# Patient Record
Sex: FEMALE | Race: WHITE
[De-identification: names, ages, dates, MRNs, and addresses within clinical notes are randomized per-mention and may not be internally consistent; named-entity substitution may affect disease eponyms.]

---

## 2017-02-17 ENCOUNTER — HOSPITAL ENCOUNTER (EMERGENCY)
Dept: HOSPITAL 61 - ER | Age: 67
LOS: 1 days | Discharge: HOME | End: 2017-02-18
Payer: MEDICARE

## 2017-02-17 VITALS — BODY MASS INDEX: 22.5 KG/M2 | WEIGHT: 140 LBS | HEIGHT: 66 IN

## 2017-02-17 VITALS — SYSTOLIC BLOOD PRESSURE: 151 MMHG | DIASTOLIC BLOOD PRESSURE: 65 MMHG
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
## 2017-02-17 DIAGNOSIS — J44.9: ICD-10-CM
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
## 2017-02-17 DIAGNOSIS — F41.9: ICD-10-CM

## 2017-02-17 DIAGNOSIS — Z79.02: ICD-10-CM

## 2017-02-17 DIAGNOSIS — Z95.5: ICD-10-CM

## 2017-02-17 DIAGNOSIS — S51.812A: Primary | ICD-10-CM

## 2017-02-17 DIAGNOSIS — K21.9: ICD-10-CM

## 2017-02-17 DIAGNOSIS — I25.2: ICD-10-CM

## 2017-02-17 DIAGNOSIS — Z98.49: ICD-10-CM

## 2017-02-17 DIAGNOSIS — Z90.81: ICD-10-CM

## 2017-02-17 DIAGNOSIS — I10: ICD-10-CM

## 2017-02-17 PROCEDURE — 99283 EMERGENCY DEPT VISIT LOW MDM: CPT

## 2017-02-17 NOTE — PHYS DOC
Past Medical History


Past Medical History:  Anxiety, COPD, GERD, Hypertension, MI


Additional Past Medical Histor:  external lupus


Past Surgical History:  Splenectomy, Tonsillectomy, Other


Additional Past Surgical Histo:  3 stents, liver repair, cataract removal


Alcohol Use:  None


Drug Use:  None





Adult General


Chief Complaint


Chief Complaint:  LACERATION/AVULSION





HPI


HPI


Patient is a 66  year old female with history of anxiety COPD acid reflex 

currently on Plavix who presents today with cat scratches on the left forearm 

from her own cat. Patient states the cats are up-to-date with their shots.





Review of Systems


Review of Systems





Constitutional: Denies fever or chills []


Eyes: Denies change in visual acuity, redness, or eye pain []


Musculoskeletal: Denies back pain or joint pain []


Integument: cat scratches on the left forearm.


Neurologic: Denies headache, focal weakness or sensory changes []


Endocrine: Denies polyuria or polydipsia []





Current Medications


Current Medications





 Current Medications








 Medications


  (Trade)  Dose


 Ordered  Sig/Mikey  Start Time


 Stop Time Status Last Admin


Dose Admin


 


 Acetaminophen/


 Hydrocodone Bitart


  (Lortab 5/325)  1 tab  1X  ONCE  2/17/17 23:00


 2/17/17 23:01 DC 2/17/17 22:57


1 TAB


 


 Amoxicillin/


 Clavulanate


 Potassium


  (Augmentin 500/


 125mg)  1 tab  1X  ONCE  2/17/17 23:00


 2/17/17 23:01 DC 2/17/17 22:57


1 TAB


 


 Lidocaine/


 Epinephrine


  (Let Topical)  3 ml  1X  ONCE  2/17/17 23:00


 2/17/17 23:01 DC 2/17/17 22:58


3 ML


 


 Lidocaine/Sodium


 Bicarbonate


  (Buffered


 Lidocaine 1%)  20 ml  1X  ONCE  2/17/17 23:00


 2/17/17 23:01 DC 2/17/17 22:58


20 ML











Allergies


Allergies





 Allergies








Coded Allergies Type Severity Reaction Last Updated Verified


 


  No Known Drug Allergies    6/14/14 No











Physical Exam


Physical Exam





Constitutional: Well developed, well nourished, no acute distress, non-toxic 

appearance. []


HENT: Normocephalic, atraumatic, bilateral external ears normal, oropharynx 

moist, no oral exudates, nose normal. []


Eyes: PERRLA, EOMI, conjunctiva normal, no discharge. [] 


Skin: Left forearm with multiple skin tears. The skin on the left forearm is 

also very fragile with bruising and ecchymosis most of it from chronic use of 

Plavix. +2 left radial pulse. Adequate radial ulnar and medial sensation to the 

left forearm.


Back: No tenderness, no CVA tenderness. [] 


Extremities: No tenderness, no cyanosis, no clubbing, ROM intact, no edema. [] 


Neurologic: Alert and oriented X 3, normal motor function, normal sensory 

function, no focal deficits noted. []


Psychologic: Affect normal, judgement normal, mood normal. []





Current Patient Data


Vital Signs





 Vital Signs








  Date Time  Temp Pulse Resp B/P Pulse Ox O2 Delivery O2 Flow Rate FiO2


 


2/17/17 22:57   20  97 Room Air  


 


2/17/17 22:33 97.8 67      





 97.8       











EKG


EKG


[]





Radiology/Procedures


Radiology/Procedures


[]





Course & Med Decision Making


Course & Med Decision Making


Pertinent Labs and Imaging studies reviewed. (See chart for details)





Patient is in the ED with multiple skin tears on the left forearm from cat 

scratches, the cat is Up-To-Date  Shots. Her skin is very fragile from history 

of chronic Plavix use. The area was cleaned by the registered nurse, let 

solution was applied to the area for a while and the registered nurse laid down 

they skin tears well approximating them, suturing was not possible. Discharged 

with Augmentin first dose in the ED. Instructed to keep the area clean and dry. 

Provided proper return precautions including the need to return to the ED at 

any point she develops signs and symptoms of infection including fever 

increased redness to the area warmth or odor drainage from the area. Follow-up 

with the primary care doctor next week. Her tetanus is up-to-date.





Dragon Disclaimer


Dragon Disclaimer


This electronic medical record was generated, in whole or in part, using a 

voice recognition dictation system.





Departure


Departure


Impression:  


 Primary Impression:  


 Laceration of forearm, left


 Additional Impression:  


 Cat scratch of forearm


Disposition:  01 HOME, SELF-CARE


Condition:  STABLE


Referrals:  


GIGI HERNANDEZ MD (PCP)


Follow-up with your doctor next week


Patient Instructions:  Cat Scratch Disease-Brief





Additional Instructions:


You were seen with cat scratches on the left forearm. Keep the area clean and 

dry. Ensure you complete your antibiotics. Come back to the emergency room if 

you develop any fever increased redness or odor drainage from the area. Follow-

up with your doctor next week.


Scripts


Amoxicillin/Potassium Clav (Augmentin 875-125 Tablet)1 Each Tablet1 Tab PO BID #

20 TAB


   Prov:MICHAEL APODACA         2/17/17





Problem Qualifiers








 Primary Impression:  


 Laceration of forearm, left


 Encounter type:  initial encounter  Qualified Code:  S51.812A - Laceration 

without foreign body of left forearm, initial encounter


 Additional Impression:  


 Cat scratch of forearm


 Encounter type:  initial encounter  Laterality:  left  Qualified Code:  

S50.812A - Abrasion of left forearm, initial encounter





MICHAEL APODACA Feb 17, 2017 23:14

## 2017-03-28 ENCOUNTER — HOSPITAL ENCOUNTER (INPATIENT)
Dept: HOSPITAL 61 - CCL | Age: 67
LOS: 1 days | Discharge: HOME | DRG: 249 | End: 2017-03-29
Attending: INTERNAL MEDICINE | Admitting: INTERNAL MEDICINE
Payer: MEDICARE

## 2017-03-28 VITALS — DIASTOLIC BLOOD PRESSURE: 75 MMHG | SYSTOLIC BLOOD PRESSURE: 163 MMHG

## 2017-03-28 VITALS — SYSTOLIC BLOOD PRESSURE: 149 MMHG | DIASTOLIC BLOOD PRESSURE: 73 MMHG

## 2017-03-28 VITALS — DIASTOLIC BLOOD PRESSURE: 69 MMHG | SYSTOLIC BLOOD PRESSURE: 138 MMHG

## 2017-03-28 VITALS — WEIGHT: 133.12 LBS | HEIGHT: 66 IN | BODY MASS INDEX: 21.39 KG/M2

## 2017-03-28 VITALS — DIASTOLIC BLOOD PRESSURE: 63 MMHG | SYSTOLIC BLOOD PRESSURE: 164 MMHG

## 2017-03-28 VITALS — DIASTOLIC BLOOD PRESSURE: 60 MMHG | SYSTOLIC BLOOD PRESSURE: 154 MMHG

## 2017-03-28 VITALS — DIASTOLIC BLOOD PRESSURE: 74 MMHG | SYSTOLIC BLOOD PRESSURE: 155 MMHG

## 2017-03-28 VITALS — SYSTOLIC BLOOD PRESSURE: 156 MMHG | DIASTOLIC BLOOD PRESSURE: 72 MMHG

## 2017-03-28 VITALS — DIASTOLIC BLOOD PRESSURE: 65 MMHG | SYSTOLIC BLOOD PRESSURE: 136 MMHG

## 2017-03-28 VITALS — DIASTOLIC BLOOD PRESSURE: 64 MMHG | SYSTOLIC BLOOD PRESSURE: 146 MMHG

## 2017-03-28 VITALS — DIASTOLIC BLOOD PRESSURE: 60 MMHG | SYSTOLIC BLOOD PRESSURE: 140 MMHG

## 2017-03-28 VITALS — DIASTOLIC BLOOD PRESSURE: 75 MMHG | SYSTOLIC BLOOD PRESSURE: 146 MMHG

## 2017-03-28 VITALS — SYSTOLIC BLOOD PRESSURE: 142 MMHG | DIASTOLIC BLOOD PRESSURE: 63 MMHG

## 2017-03-28 VITALS — SYSTOLIC BLOOD PRESSURE: 144 MMHG | DIASTOLIC BLOOD PRESSURE: 66 MMHG

## 2017-03-28 VITALS — SYSTOLIC BLOOD PRESSURE: 153 MMHG | DIASTOLIC BLOOD PRESSURE: 62 MMHG

## 2017-03-28 VITALS — DIASTOLIC BLOOD PRESSURE: 64 MMHG | SYSTOLIC BLOOD PRESSURE: 154 MMHG

## 2017-03-28 VITALS — DIASTOLIC BLOOD PRESSURE: 70 MMHG | SYSTOLIC BLOOD PRESSURE: 144 MMHG

## 2017-03-28 VITALS — SYSTOLIC BLOOD PRESSURE: 158 MMHG | DIASTOLIC BLOOD PRESSURE: 71 MMHG

## 2017-03-28 VITALS — SYSTOLIC BLOOD PRESSURE: 153 MMHG | DIASTOLIC BLOOD PRESSURE: 60 MMHG

## 2017-03-28 DIAGNOSIS — Z79.02: ICD-10-CM

## 2017-03-28 DIAGNOSIS — L93.0: ICD-10-CM

## 2017-03-28 DIAGNOSIS — I73.9: ICD-10-CM

## 2017-03-28 DIAGNOSIS — E78.5: ICD-10-CM

## 2017-03-28 DIAGNOSIS — I25.110: Primary | ICD-10-CM

## 2017-03-28 DIAGNOSIS — Z98.61: ICD-10-CM

## 2017-03-28 DIAGNOSIS — I10: ICD-10-CM

## 2017-03-28 DIAGNOSIS — Z79.82: ICD-10-CM

## 2017-03-28 LAB
ANION GAP SERPL CALC-SCNC: 8 MMOL/L (ref 6–14)
APTT BLD: 29 SEC (ref 24–38)
BUN SERPL-MCNC: 9 MG/DL (ref 7–20)
CALCIUM SERPL-MCNC: 9.1 MG/DL (ref 8.5–10.1)
CHLORIDE SERPL-SCNC: 107 MMOL/L (ref 98–107)
CO2 SERPL-SCNC: 29 MMOL/L (ref 21–32)
CREAT SERPL-MCNC: 0.8 MG/DL (ref 0.6–1)
ERYTHROCYTE [DISTWIDTH] IN BLOOD BY AUTOMATED COUNT: 14.3 % (ref 11.5–14.5)
GFR SERPLBLD BASED ON 1.73 SQ M-ARVRAT: 71.5 ML/MIN
GLUCOSE SERPL-MCNC: 92 MG/DL (ref 70–99)
HCT VFR BLD CALC: 39.9 % (ref 36–47)
HGB BLD-MCNC: 13.1 G/DL (ref 12–15.5)
INR PPP: 0.9 (ref 0.8–1.1)
MCH RBC QN AUTO: 29 PG (ref 25–35)
MCHC RBC AUTO-ENTMCNC: 33 G/DL (ref 31–37)
MCV RBC AUTO: 88 FL (ref 79–100)
PLATELET # BLD AUTO: 267 X10^3/UL (ref 140–400)
POTASSIUM SERPL-SCNC: 3.5 MMOL/L (ref 3.5–5.1)
PROTHROMBIN TIME: 11.9 SEC (ref 11.7–14)
RBC # BLD AUTO: 4.54 X10^6/UL (ref 3.5–5.4)
SODIUM SERPL-SCNC: 144 MMOL/L (ref 136–145)
WBC # BLD AUTO: 3.9 X10^3/UL (ref 4–11)

## 2017-03-28 PROCEDURE — 94250: CPT

## 2017-03-28 PROCEDURE — C1892 INTRO/SHEATH,FIXED,PEEL-AWAY: HCPCS

## 2017-03-28 PROCEDURE — 36415 COLL VENOUS BLD VENIPUNCTURE: CPT

## 2017-03-28 PROCEDURE — C1725 CATH, TRANSLUMIN NON-LASER: HCPCS

## 2017-03-28 PROCEDURE — 4A023N7 MEASUREMENT OF CARDIAC SAMPLING AND PRESSURE, LEFT HEART, PERCUTANEOUS APPROACH: ICD-10-PCS | Performed by: INTERNAL MEDICINE

## 2017-03-28 PROCEDURE — 85027 COMPLETE CBC AUTOMATED: CPT

## 2017-03-28 PROCEDURE — 02703DZ DILATION OF CORONARY ARTERY, ONE ARTERY WITH INTRALUMINAL DEVICE, PERCUTANEOUS APPROACH: ICD-10-PCS | Performed by: INTERNAL MEDICINE

## 2017-03-28 PROCEDURE — 85730 THROMBOPLASTIN TIME PARTIAL: CPT

## 2017-03-28 PROCEDURE — 93458 L HRT ARTERY/VENTRICLE ANGIO: CPT

## 2017-03-28 PROCEDURE — 85610 PROTHROMBIN TIME: CPT

## 2017-03-28 PROCEDURE — 94640 AIRWAY INHALATION TREATMENT: CPT

## 2017-03-28 PROCEDURE — C1887 CATHETER, GUIDING: HCPCS

## 2017-03-28 PROCEDURE — B2111ZZ FLUOROSCOPY OF MULTIPLE CORONARY ARTERIES USING LOW OSMOLAR CONTRAST: ICD-10-PCS | Performed by: INTERNAL MEDICINE

## 2017-03-28 PROCEDURE — B2151ZZ FLUOROSCOPY OF LEFT HEART USING LOW OSMOLAR CONTRAST: ICD-10-PCS | Performed by: INTERNAL MEDICINE

## 2017-03-28 PROCEDURE — C1874 STENT, COATED/COV W/DEL SYS: HCPCS

## 2017-03-28 PROCEDURE — 92928 PRQ TCAT PLMT NTRAC ST 1 LES: CPT

## 2017-03-28 PROCEDURE — C1769 GUIDE WIRE: HCPCS

## 2017-03-28 PROCEDURE — 80048 BASIC METABOLIC PNL TOTAL CA: CPT

## 2017-03-28 RX ADMIN — CARVEDILOL SCH MG: 3.12 TABLET, FILM COATED ORAL at 17:23

## 2017-03-28 RX ADMIN — ALPRAZOLAM SCH MG: 0.5 TABLET ORAL at 20:58

## 2017-03-28 RX ADMIN — ALBUTEROL SULFATE SCH MG: 108 AEROSOL, METERED RESPIRATORY (INHALATION) at 19:44

## 2017-03-28 RX ADMIN — ALBUTEROL SULFATE SCH MG: 108 AEROSOL, METERED RESPIRATORY (INHALATION) at 15:45

## 2017-03-28 NOTE — CARD
--------------- APPROVED REPORT --------------





Procedure(s) performed: 1.  Left heart catheterization, selective coronary angiography and left ventr
iculography via the right transradial approach

2.  Successful PCI/drug eluting stent placement to the obtuse marginal branch of left circumflex seda
ry

140mL VISIPAQUE

8.0 mins Fluoro

228.08mGy

[id number]



INDICATION

The indication(s) include : unstable angina .



PROCEDURE NARRATIVE

After explaining the risks, benefits and alternative options, informed consent was obtained from keyur
ent.  Patient was brought to the cardiac Cath Lab and right wrist was prepped and draped in the usual
 fashion after confirming a positive modified Devin's test.  Arterial access was obtained in the rig
t radial artery and a 6 [place] sheath was inserted.  6 [place] catheter was used to perform sergio
ective angiography of the left and right coronary arteries.  6 [place] pigtail catheter was used to pe
rform left ventriculography.  The following findings were noted.



FINDINGS

1.  Hemodynamics: Left ventricular end-diastolic pressure of 8 mmHg.  No pullback gradient across the
 aortic valve.

2.  Left ventriculography:  Normal left ventricle systolic function with ejection fraction estimated 
at 50-55%.  No significant mitral regurgitation seen.

3.  Coronary angiography:

a.  The left main coronary artery arose from the left sinus of Valsalva, gave rise to the left anteri
or descending and left circumflex arteries and did not show any significant stenosis.

b.  The left anterior descending artery showed a widely patent stent in the proximal to mid segment. 
The mid to distal segment showed a long 60-70% stenosis with mild to moderate diffuse disease distall
y.

c.  The left circumflex artery was a large and dominant vessel that showed widely patent stent in the
 midsegment. The second obtuse marginal branch which is a good caliber vessel showed 90% stenosis in 
the proximal segment.

d.  The right coronary artery was a small and nondominant vessel that showed 90% stenosis in the ree
inal branch.



INTERVENTION

The left main coronary artery was engaged with a 6 [place] XB 3.5 guide catheter and the stenosis in t
he second obtuse marginal branch was crossed with a 0.014 inch [place] guidewire. This was p
redilated with a 2.5 x 12 mm balloon following which this was successfully treated with a 2.5 x 12 mm
 resolute drug eluting stent. Follow-up angiography showed resolution of the stenosis to 0% with REYNALDO
-3 distal flow. Patient tolerated the procedure well. Hemostasis was achieved using TR band. There we
re no immediate complications.



Conclusion

1.  Two-vessel coronary artery disease as described above with 90% stenosis involving a good caliber 
second obtuse marginal branch of left circumflex artery and 60-70% long stenosis involving the mid to
 distal segment of left anterior descending artery. The previously placed stents in the proximal to m
id segment of LAD and midsegment of LCx were widely patent.

2.  Successful PCI/drug eluting stent placement to the second obtuse marginal branch of left circumfl
ex artery.

3.  Normal left ventricle systolic function with ejection fraction estimated at 50-55%.



Recommendations

1. Aspirin 325 mg daily

2. Plavix 75 mg daily for preferably one year

3. Cardiovascular risk factor modification

## 2017-03-29 VITALS — DIASTOLIC BLOOD PRESSURE: 66 MMHG | SYSTOLIC BLOOD PRESSURE: 142 MMHG

## 2017-03-29 VITALS — SYSTOLIC BLOOD PRESSURE: 126 MMHG | DIASTOLIC BLOOD PRESSURE: 65 MMHG

## 2017-03-29 VITALS — SYSTOLIC BLOOD PRESSURE: 157 MMHG | DIASTOLIC BLOOD PRESSURE: 70 MMHG

## 2017-03-29 VITALS — SYSTOLIC BLOOD PRESSURE: 144 MMHG | DIASTOLIC BLOOD PRESSURE: 83 MMHG

## 2017-03-29 RX ADMIN — ALBUTEROL SULFATE SCH MG: 108 AEROSOL, METERED RESPIRATORY (INHALATION) at 15:16

## 2017-03-29 RX ADMIN — ALBUTEROL SULFATE SCH MG: 108 AEROSOL, METERED RESPIRATORY (INHALATION) at 07:17

## 2017-03-29 RX ADMIN — ALPRAZOLAM SCH MG: 0.5 TABLET ORAL at 08:16

## 2017-03-29 RX ADMIN — ALBUTEROL SULFATE SCH MG: 108 AEROSOL, METERED RESPIRATORY (INHALATION) at 10:56

## 2017-03-29 RX ADMIN — CARVEDILOL SCH MG: 3.12 TABLET, FILM COATED ORAL at 08:15

## 2017-03-29 NOTE — PDOC3
CAYETANO MICHEL APRN 3/29/17 1026:


Discharge Summary


Visit Information


Date of Admission:  Mar 28, 2017


Date of Discharge:  Mar 29, 2017


Admitting Diagnosis Comment:


1.  CAD 


2.  PVD


3. discoid lupus


4. hypertension 


5. hyperlipidemia


Final Diagnosis


1.  CAD; s/p Resolute LINO to OM2


2.  PVD


3. discoid lupus


4. hypertension 


5. hyperlipidemia





Brief Hospital Course


Allergies





 Allergies








Coded Allergies Type Severity Reaction Last Updated Verified


 


  No Known Drug Allergies    6/14/14 No








Vital Signs





 Vital Signs








  Date Time  Temp Pulse Resp B/P Pulse Ox O2 Delivery O2 Flow Rate FiO2


 


3/29/17 08:15  84  157/70    


 


3/29/17 07:25 98.4  20   Room Air  





 98.4       


 


3/29/17 07:18     98   


 


3/28/17 12:36       2.0 








Lab Results





Laboratory Tests








Test


  3/28/17


11:00


 


White Blood Count


  3.9x10^3/uL


(4.0-11.0)


 


Red Blood Count


  4.54x10^6/uL


(3.50-5.40)


 


Hemoglobin


  13.1g/dL


(12.0-15.5)


 


Hematocrit


  39.9%


(36.0-47.0)


 


Mean Corpuscular Volume 88fL () 


 


Mean Corpuscular Hemoglobin 29pg (25-35) 


 


Mean Corpuscular Hemoglobin


Concent 33g/dL (31-37) 


 


 


Red Cell Distribution Width


  14.3%


(11.5-14.5)


 


Platelet Count


  267x10^3/uL


(140-400)


 


Prothrombin Time


  11.9SEC


(11.7-14.0)


 


Prothromb Time International


Ratio 0.9 (0.8-1.1) 


 


 


Activated Partial


Thromboplast Time 29SEC (24-38) 


 


 


Sodium Level


  144mmol/L


(136-145)


 


Potassium Level


  3.5mmol/L


(3.5-5.1)


 


Chloride Level


  107mmol/L


()


 


Carbon Dioxide Level


  29mmol/L


(21-32)


 


Anion Gap 8 (6-14) 


 


Blood Urea Nitrogen 9mg/dL (7-20) 


 


Creatinine


  0.8mg/dL


(0.6-1.0)


 


Estimated GFR


(Cockcroft-Gault) 71.5 


 


 


Glucose Level


  92mg/dL


(70-99)


 


Calcium Level


  9.1mg/dL


(8.5-10.1)








Laboratory Tests








Test


  3/28/17


11:00


 


White Blood Count


  3.9x10^3/uL


(4.0-11.0)


 


Red Blood Count


  4.54x10^6/uL


(3.50-5.40)


 


Hemoglobin


  13.1g/dL


(12.0-15.5)


 


Hematocrit


  39.9%


(36.0-47.0)


 


Mean Corpuscular Volume 88fL () 


 


Mean Corpuscular Hemoglobin 29pg (25-35) 


 


Mean Corpuscular Hemoglobin


Concent 33g/dL (31-37) 


 


 


Red Cell Distribution Width


  14.3%


(11.5-14.5)


 


Platelet Count


  267x10^3/uL


(140-400)


 


Prothrombin Time


  11.9SEC


(11.7-14.0)


 


Prothromb Time International


Ratio 0.9 (0.8-1.1) 


 


 


Activated Partial


Thromboplast Time 29SEC (24-38) 


 


 


Sodium Level


  144mmol/L


(136-145)


 


Potassium Level


  3.5mmol/L


(3.5-5.1)


 


Chloride Level


  107mmol/L


()


 


Carbon Dioxide Level


  29mmol/L


(21-32)


 


Anion Gap 8 (6-14) 


 


Blood Urea Nitrogen 9mg/dL (7-20) 


 


Creatinine


  0.8mg/dL


(0.6-1.0)


 


Estimated GFR


(Cockcroft-Gault) 71.5 


 


 


Glucose Level


  92mg/dL


(70-99)


 


Calcium Level


  9.1mg/dL


(8.5-10.1)








Brief Hospital Course


Ms. Killian  is a 66 old female with a known history of CAD and previous PCI to 

the LAD and RCA. She presented to the office earlier this month with 

retrosternal chest pain radiating to the jaw and similar to her previous 

symptoms.  Cardiac catheterization was advised and patient was agreeable.  She 

underwent cardiac catheterization on 3/28/2017 with two-vessel coronary artery 

disease with 90% stenosis involving a good caliber second obtuse marginal 

branch of left circumflex artery and 60-70% long stenosis involving the mid to 

distal segment of left anterior descending artery. The previously placed stents 

in the proximal to mid segment of LAD and midsegment of LCx were widely patent. 

Successful PCI/drug eluting stent placement to the second obtuse marginal 

branch of left circumflex artery. Post-procedure bleeding from radial 

arteriotomy and required 2nd TR band for stabilization.  Ecchymosis from wrist 

to mid-upper forearm today.  Radial pulse palpable and fingers warm.  Wound 

care to evaluate arm prior to discharge. No dysrhythmias on telemetry overnight 

and denies CP or dyspnea.





Discharge Information


Condition at Discharge:  Stable


Follow Up:  Weeks (Dr. Montejo on 4/20/2017 as scheduled; with PCP in the next 

3 - 5 days)


Disposition/Orders:  D/C to Home


Scheduled


([atrovent hfa]) 17 MCG QID (Reported) 


Alprazolam (Xanax) 0.5-1 TAB PO BID (Reported) 


Aspirin (Aspir 81) 81 MG PO DAILY (Reported) 


Atorvastatin Calcium (Atorvastatin Calcium) 40 MG PO HS (Reported) 


Carvedilol (Coreg) 1 TAB PO BID (Reported) 


Clopidogrel Bisulfate (Clopidogrel) 75 MG PO DAILY (Reported) 


Esomeprazole Magnesium (Nexium Capsule) 40 MG PO DAILYAC (Reported) 





Patient Instructions


Patient Instructions


GENERAL INSTRUCTIONS:





1.   Your dressing should be removed prior to leaving the hospital.


2.   It is OK to shower the day after your procedure.


3.   If you received stents, be sure to carry your stent information card with 

you in your wallet/purse at all times.


4.   Call the office immediately at [phone] if you notice any fever or if 

there is redness, worsening tenderness/pain, increased bruising, or drainage   

                               from the puncture site.    


5.   Should you have bleeding from the site, lie down immediately & put 

pressure on the site.  The pressure should be hard enough to stop the bleeding. 


       Have the nearest person call 911.  DO NOT try to drive to the ER with 

active bleeding.   


6.   If you notice a change in color, coolness to touch, or loss of feeling in 

the affected extremity, come to the emergency room.  Please have someone  


      drive you or call 911 if no one is available.  DO NOT drive yourself.


7.   If you normally take glucophage (metformin), please do not take this 

medicine for 48 hours following your procedure.


8.   DO NOT STOP TAKING YOUR PLAVIX OR ASPIRIN UNLESS IT IS CLEARED BY A 

REPRESENTATIVE OF YOUR CARDIOLOGIST AT OUR  


      OFFICE.


9.   QUIT SMOKING: the American Heart Association, American Lung Association, & 

American Cancer Society have cessation resources available on  


      their websites


10.   Please have someone available to drive you home from the hospital as you 

may be limited by sedation medications given during the procedure.








Radial Artery (Wrist) access:


1.   No pushing, pulling, lifting, typing, or anything that requires repetitive 

use/movement of the affected wrist for 12-14 days following your procedure.


2.   OK to drive the day following your procedure. (This is because of effects 

of sedating medications.)





Call the office at [phone] for any questions or concerns.





SABINA MONTEJO MD 3/29/17 7445:


Discharge Summary


Brief Hospital Course


Brief Hospital Course


Patient seen and examined.  Agree with NP's assessment and plan. s/p PCI/stent 

to obtuse marginal branch of left circumflex artery yesterday, chest pain free. 

The patient placed stents in LCx and LAD are patent. Discharge home today and 

follow up with her office in one month.





Discharge Information


Scheduled


([atrovent hfa]) 17 MCG QID (Reported) 


Alprazolam (Xanax) 0.5-1 TAB PO BID (Reported) 


Aspirin (Aspir 81) 81 MG PO DAILY (Reported) 


Atorvastatin Calcium (Atorvastatin Calcium) 40 MG PO HS (Reported) 


Carvedilol (Coreg) 1 TAB PO BID (Reported) 


Clopidogrel Bisulfate (Clopidogrel) 75 MG PO DAILY (Reported) 


Esomeprazole Magnesium (Nexium Capsule) 40 MG PO DAILYAC (Reported) 








CAYETANO MICHEL Mar 29, 2017 10:26


SABINA MONTEJO MD Mar 29, 2017 15:19

## 2018-05-07 ENCOUNTER — HOSPITAL ENCOUNTER (EMERGENCY)
Dept: HOSPITAL 61 - ER | Age: 68
Discharge: HOME | End: 2018-05-07
Payer: MEDICARE

## 2018-05-07 VITALS — SYSTOLIC BLOOD PRESSURE: 136 MMHG | DIASTOLIC BLOOD PRESSURE: 61 MMHG

## 2018-05-07 DIAGNOSIS — I10: ICD-10-CM

## 2018-05-07 DIAGNOSIS — K21.9: ICD-10-CM
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
## 2018-05-07 DIAGNOSIS — S50.812A: Primary | ICD-10-CM

## 2018-05-07 DIAGNOSIS — I25.2: ICD-10-CM
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
## 2018-05-07 DIAGNOSIS — M32.9: ICD-10-CM

## 2018-05-07 DIAGNOSIS — J44.9: ICD-10-CM

## 2018-05-07 PROCEDURE — 90471 IMMUNIZATION ADMIN: CPT

## 2018-05-07 PROCEDURE — 90715 TDAP VACCINE 7 YRS/> IM: CPT

## 2018-05-07 PROCEDURE — 99283 EMERGENCY DEPT VISIT LOW MDM: CPT

## 2018-05-07 RX ADMIN — HYDROCODONE BITARTRATE AND ACETAMINOPHEN 1 TAB: 5; 325 TABLET ORAL at 16:45

## 2018-05-07 RX ADMIN — TETANUS TOXOID, REDUCED DIPHTHERIA TOXOID AND ACELLULAR PERTUSSIS VACCINE, ADSORBED 1 ML: 5; 2.5; 8; 8; 2.5 SUSPENSION INTRAMUSCULAR at 16:56

## 2018-05-07 RX ADMIN — ONDANSETRON 1 MG: 4 TABLET, ORALLY DISINTEGRATING ORAL at 17:36

## 2018-12-11 ENCOUNTER — HOSPITAL ENCOUNTER (OUTPATIENT)
Dept: HOSPITAL 61 - KCIC DEXA | Age: 68
Discharge: HOME | End: 2018-12-11
Attending: FAMILY MEDICINE
Payer: MEDICARE

## 2018-12-11 DIAGNOSIS — Z13.820: ICD-10-CM

## 2018-12-11 DIAGNOSIS — Z12.31: Primary | ICD-10-CM

## 2018-12-11 DIAGNOSIS — M81.8: ICD-10-CM

## 2018-12-11 PROCEDURE — 77067 SCR MAMMO BI INCL CAD: CPT

## 2018-12-11 PROCEDURE — 77080 DXA BONE DENSITY AXIAL: CPT

## 2018-12-11 PROCEDURE — 77063 BREAST TOMOSYNTHESIS BI: CPT

## 2018-12-11 NOTE — [facility]
Bilateral digital screening mammograms with 3-D tomosynthesis:

 

Reason for examination: Routine screening.

 

Comparison is made to previous studies dated 5/21/2014 and 12/21/2012.

 

Bilateral mammograms in CC and oblique projections were obtained with 2-D 

imaging and 3-D tomosynthesis imaging on a Siemens Inspiration unit and 

reviewed on the workstation. Interpretation was made with the benefit of 

CAD.

 

The skin and nipples show no abnormalities. No abnormal axillary lymph 

nodes are seen. The breast parenchyma shows scattered fatty and 

fibroglandular density. (Breast density: Category B.) There are no 

dominant masses, suspicious calcifications or architectural distortion. A 

few scattered calcifications are again seen.

 

Impression:

 

No evidence of malignancy. Recommend routine screening.

 

BI-RAD Category 2: Benign.

 

"Our facility is accredited by the American College of Radiology 

Mammography Program."

 

This patient's information has been entered into a reminder system for the

patient to be notified with the results of her examination and a target 

date for the next mammogram.

 

Electronically signed by: Brionna Farah MD (12/11/2018 6:10 PM) [facility]

## 2018-12-11 NOTE — [facility]
Indication: Postmenopausal screening for osteoporosis. Follow-up study.

 

COMPARISON: May 21, 2014..

 

Bone Density:

-BMD:

(g/cm2)

- AP Spine Total (L1-L4).......... 0.701.

- Total left Hip................. 0.549. 

T-Score:

- AP Spine Total (L1-L4)......... -3.1.

- Total left Hip................. -3.2. 

Z-Score:

- AP Spine Total (L1-L4).......... -1.1.

- Total left Hip................. -1.7. 

World Health Organization criteria for BMD interpretation classify 

patients as Normal (T-score at or above -1.0), Osteopenic (T-score between

-1.0 and -2.5), or Osteoporotic (T-score at or below -2.5).

 

 

Impression:

1. AP Spine Total L1-L4--- osteoporosis. Since the previous study, there 

has been a decrease in the BMD of approximately 11 percent..

2. Total left Hip--- osteoporosis. Since the previous study, there has 

been a decrease in the BMD of approximately 15 percent.

 

Electronically signed by: Matthew Monroe MD (12/11/2018 5:13 PM) [facility]

## 2019-08-09 ENCOUNTER — HOSPITAL ENCOUNTER (INPATIENT)
Dept: HOSPITAL 61 - ER | Age: 69
LOS: 5 days | Discharge: HOME HEALTH | DRG: 64 | End: 2019-08-14
Attending: FAMILY MEDICINE | Admitting: FAMILY MEDICINE
Payer: MEDICARE

## 2019-08-09 VITALS — WEIGHT: 114.5 LBS | HEIGHT: 65 IN | BODY MASS INDEX: 19.08 KG/M2

## 2019-08-09 VITALS — DIASTOLIC BLOOD PRESSURE: 74 MMHG | SYSTOLIC BLOOD PRESSURE: 181 MMHG

## 2019-08-09 DIAGNOSIS — Z85.05: ICD-10-CM

## 2019-08-09 DIAGNOSIS — F41.9: ICD-10-CM

## 2019-08-09 DIAGNOSIS — J44.9: ICD-10-CM

## 2019-08-09 DIAGNOSIS — M81.0: ICD-10-CM

## 2019-08-09 DIAGNOSIS — L93.0: ICD-10-CM

## 2019-08-09 DIAGNOSIS — Z87.19: ICD-10-CM

## 2019-08-09 DIAGNOSIS — Z91.19: ICD-10-CM

## 2019-08-09 DIAGNOSIS — Z82.49: ICD-10-CM

## 2019-08-09 DIAGNOSIS — Z87.11: ICD-10-CM

## 2019-08-09 DIAGNOSIS — Z87.891: ICD-10-CM

## 2019-08-09 DIAGNOSIS — I63.9: Primary | ICD-10-CM

## 2019-08-09 DIAGNOSIS — K21.9: ICD-10-CM

## 2019-08-09 DIAGNOSIS — I10: ICD-10-CM

## 2019-08-09 DIAGNOSIS — Z98.62: ICD-10-CM

## 2019-08-09 DIAGNOSIS — N30.00: ICD-10-CM

## 2019-08-09 DIAGNOSIS — R47.01: ICD-10-CM

## 2019-08-09 DIAGNOSIS — Z86.010: ICD-10-CM

## 2019-08-09 DIAGNOSIS — C22.0: ICD-10-CM

## 2019-08-09 DIAGNOSIS — F32.9: ICD-10-CM

## 2019-08-09 DIAGNOSIS — I73.9: ICD-10-CM

## 2019-08-09 DIAGNOSIS — E83.42: ICD-10-CM

## 2019-08-09 DIAGNOSIS — E78.5: ICD-10-CM

## 2019-08-09 DIAGNOSIS — E87.6: ICD-10-CM

## 2019-08-09 DIAGNOSIS — Z98.61: ICD-10-CM

## 2019-08-09 DIAGNOSIS — E43: ICD-10-CM

## 2019-08-09 DIAGNOSIS — Z79.82: ICD-10-CM

## 2019-08-09 DIAGNOSIS — Z86.73: ICD-10-CM

## 2019-08-09 DIAGNOSIS — I25.10: ICD-10-CM

## 2019-08-09 DIAGNOSIS — Z90.81: ICD-10-CM

## 2019-08-09 LAB
ALBUMIN SERPL-MCNC: 2.9 G/DL (ref 3.4–5)
ALBUMIN/GLOB SERPL: 0.7 {RATIO} (ref 1–1.7)
ALP SERPL-CCNC: 80 U/L (ref 46–116)
ALT SERPL-CCNC: 33 U/L (ref 14–59)
ANION GAP SERPL CALC-SCNC: 12 MMOL/L (ref 6–14)
APTT BLD: 26 SEC (ref 24–38)
APTT PPP: YELLOW S
AST SERPL-CCNC: 66 U/L (ref 15–37)
BACTERIA #/AREA URNS HPF: (no result) /HPF
BASOPHILS # BLD AUTO: 0 X10^3/UL (ref 0–0.2)
BASOPHILS NFR BLD: 0 % (ref 0–3)
BILIRUB SERPL-MCNC: 0.5 MG/DL (ref 0.2–1)
BILIRUB UR QL STRIP: NEGATIVE
BUN SERPL-MCNC: 11 MG/DL (ref 7–20)
BUN/CREAT SERPL: 10 (ref 6–20)
CALCIUM SERPL-MCNC: 8.5 MG/DL (ref 8.5–10.1)
CHLORIDE SERPL-SCNC: 103 MMOL/L (ref 98–107)
CK SERPL-CCNC: 64 U/L (ref 26–192)
CO2 SERPL-SCNC: 27 MMOL/L (ref 21–32)
CREAT SERPL-MCNC: 1.1 MG/DL (ref 0.6–1)
EOSINOPHIL NFR BLD: 0 X10^3/UL (ref 0–0.7)
EOSINOPHIL NFR BLD: 1 % (ref 0–3)
ERYTHROCYTE [DISTWIDTH] IN BLOOD BY AUTOMATED COUNT: 16.1 % (ref 11.5–14.5)
FIBRINOGEN PPP-MCNC: (no result) MG/DL
GFR SERPLBLD BASED ON 1.73 SQ M-ARVRAT: 49.1 ML/MIN
GLOBULIN SER-MCNC: 4.2 G/DL (ref 2.2–3.8)
GLUCOSE SERPL-MCNC: 107 MG/DL (ref 70–99)
HCT VFR BLD CALC: 37.2 % (ref 36–47)
HGB BLD-MCNC: 12.6 G/DL (ref 12–15.5)
LYMPHOCYTES # BLD: 1.5 X10^3/UL (ref 1–4.8)
LYMPHOCYTES NFR BLD AUTO: 25 % (ref 24–48)
MAGNESIUM SERPL-MCNC: 1.4 MG/DL (ref 1.8–2.4)
MCH RBC QN AUTO: 28 PG (ref 25–35)
MCHC RBC AUTO-ENTMCNC: 34 G/DL (ref 31–37)
MCV RBC AUTO: 83 FL (ref 79–100)
MONO #: 0.5 X10^3/UL (ref 0–1.1)
MONOCYTES NFR BLD: 8 % (ref 0–9)
NEUT #: 3.8 X10^3/UL (ref 1.8–7.7)
NEUTROPHILS NFR BLD AUTO: 66 % (ref 31–73)
NITRITE UR QL STRIP: NEGATIVE
PH UR STRIP: 7 [PH]
PLATELET # BLD AUTO: 269 X10^3/UL (ref 140–400)
POTASSIUM SERPL-SCNC: 3 MMOL/L (ref 3.5–5.1)
PROT SERPL-MCNC: 7.1 G/DL (ref 6.4–8.2)
PROT UR STRIP-MCNC: NEGATIVE MG/DL
PROTHROMBIN TIME: 12.9 SEC (ref 11.7–14)
RBC # BLD AUTO: 4.48 X10^6/UL (ref 3.5–5.4)
RBC #/AREA URNS HPF: (no result) /HPF (ref 0–2)
SODIUM SERPL-SCNC: 142 MMOL/L (ref 136–145)
SQUAMOUS #/AREA URNS LPF: (no result) /LPF
UROBILINOGEN UR-MCNC: 1 MG/DL
WBC # BLD AUTO: 5.8 X10^3/UL (ref 4–11)
WBC #/AREA URNS HPF: (no result) /HPF (ref 0–4)

## 2019-08-09 PROCEDURE — 94640 AIRWAY INHALATION TREATMENT: CPT

## 2019-08-09 PROCEDURE — 86038 ANTINUCLEAR ANTIBODIES: CPT

## 2019-08-09 PROCEDURE — 96361 HYDRATE IV INFUSION ADD-ON: CPT

## 2019-08-09 PROCEDURE — G0378 HOSPITAL OBSERVATION PER HR: HCPCS

## 2019-08-09 PROCEDURE — 76942 ECHO GUIDE FOR BIOPSY: CPT

## 2019-08-09 PROCEDURE — 94760 N-INVAS EAR/PLS OXIMETRY 1: CPT

## 2019-08-09 PROCEDURE — 87086 URINE CULTURE/COLONY COUNT: CPT

## 2019-08-09 PROCEDURE — 83550 IRON BINDING TEST: CPT

## 2019-08-09 PROCEDURE — 83605 ASSAY OF LACTIC ACID: CPT

## 2019-08-09 PROCEDURE — 82105 ALPHA-FETOPROTEIN SERUM: CPT

## 2019-08-09 PROCEDURE — 82553 CREATINE MB FRACTION: CPT

## 2019-08-09 PROCEDURE — 93306 TTE W/DOPPLER COMPLETE: CPT

## 2019-08-09 PROCEDURE — 80061 LIPID PANEL: CPT

## 2019-08-09 PROCEDURE — 85007 BL SMEAR W/DIFF WBC COUNT: CPT

## 2019-08-09 PROCEDURE — 96365 THER/PROPH/DIAG IV INF INIT: CPT

## 2019-08-09 PROCEDURE — 85730 THROMBOPLASTIN TIME PARTIAL: CPT

## 2019-08-09 PROCEDURE — 71046 X-RAY EXAM CHEST 2 VIEWS: CPT

## 2019-08-09 PROCEDURE — 99152 MOD SED SAME PHYS/QHP 5/>YRS: CPT

## 2019-08-09 PROCEDURE — 80053 COMPREHEN METABOLIC PANEL: CPT

## 2019-08-09 PROCEDURE — 82728 ASSAY OF FERRITIN: CPT

## 2019-08-09 PROCEDURE — 83540 ASSAY OF IRON: CPT

## 2019-08-09 PROCEDURE — 83735 ASSAY OF MAGNESIUM: CPT

## 2019-08-09 PROCEDURE — 86709 HEPATITIS A IGM ANTIBODY: CPT

## 2019-08-09 PROCEDURE — 82962 GLUCOSE BLOOD TEST: CPT

## 2019-08-09 PROCEDURE — 86705 HEP B CORE ANTIBODY IGM: CPT

## 2019-08-09 PROCEDURE — 47000 NEEDLE BIOPSY OF LIVER PERQ: CPT

## 2019-08-09 PROCEDURE — 36415 COLL VENOUS BLD VENIPUNCTURE: CPT

## 2019-08-09 PROCEDURE — 70551 MRI BRAIN STEM W/O DYE: CPT

## 2019-08-09 PROCEDURE — 86803 HEPATITIS C AB TEST: CPT

## 2019-08-09 PROCEDURE — 88342 IMHCHEM/IMCYTCHM 1ST ANTB: CPT

## 2019-08-09 PROCEDURE — 96375 TX/PRO/DX INJ NEW DRUG ADDON: CPT

## 2019-08-09 PROCEDURE — 93880 EXTRACRANIAL BILAT STUDY: CPT

## 2019-08-09 PROCEDURE — 87340 HEPATITIS B SURFACE AG IA: CPT

## 2019-08-09 PROCEDURE — 70450 CT HEAD/BRAIN W/O DYE: CPT

## 2019-08-09 PROCEDURE — 88307 TISSUE EXAM BY PATHOLOGIST: CPT

## 2019-08-09 PROCEDURE — 85610 PROTHROMBIN TIME: CPT

## 2019-08-09 PROCEDURE — 82378 CARCINOEMBRYONIC ANTIGEN: CPT

## 2019-08-09 PROCEDURE — 81001 URINALYSIS AUTO W/SCOPE: CPT

## 2019-08-09 PROCEDURE — 88341 IMHCHEM/IMCYTCHM EA ADD ANTB: CPT

## 2019-08-09 PROCEDURE — 74177 CT ABD & PELVIS W/CONTRAST: CPT

## 2019-08-09 PROCEDURE — 85025 COMPLETE CBC W/AUTO DIFF WBC: CPT

## 2019-08-09 PROCEDURE — 84484 ASSAY OF TROPONIN QUANT: CPT

## 2019-08-09 PROCEDURE — 93005 ELECTROCARDIOGRAM TRACING: CPT

## 2019-08-09 NOTE — RAD
Chest radiograph 8/9/2019 7:36 PM

 

INDICATION: TIA symptoms

 

COMPARISON: December 21, 2012

 

TECHNIQUE: Frontal and lateral views of the chest are provided.

 

FINDINGS:

 

The cardiomediastinal silhouette is within normal limits.

 

There are no pleural effusions. There is no pulmonary vascular congestion.

There is no pneumothorax.

 

The lungs are clear. Pulmonary emphysematous changes are present.

 

No significant osseous abnormality is identified.

 

IMPRESSION:

 

COPD changes without acute cardiopulmonary process.

 

Electronically signed by: Delma Mendiola MD (8/9/2019 9:28 PM) 

[facility]

## 2019-08-09 NOTE — PHYS DOC
Past Medical History


Past Medical History:  Anxiety, COPD, GERD, Hypertension, MI


Additional Past Medical Histor:  external lupus


Past Surgical History:  Splenectomy, Tonsillectomy, Other


Additional Past Surgical Histo:  3 stents, liver repair, cataract removal


Alcohol Use:  None


Drug Use:  None





Adult General


Chief Complaint


Chief Complaint:  NEURO SYMPTOMS/DEFICITS





[facility]


HPI





Patient is a 69  year old [f__sex] who presents with []





Review of Systems


Review of Systems





Constitutional: Denies fever or chills []


Eyes: Denies change in visual acuity, redness, or eye pain []


HENT: Denies nasal congestion or sore throat []


Respiratory: Denies cough or shortness of breath []


Cardiovascular: No additional information not addressed in HPI []


GI: Denies abdominal pain, nausea, vomiting, bloody stools or diarrhea []


: Denies dysuria or hematuria []


Musculoskeletal: Denies back pain or joint pain []


Integument: Denies rash or skin lesions []


Neurologic: Denies headache, focal weakness or sensory changes []


Endocrine: Denies polyuria or polydipsia []





All other systems were reviewed and found to be within normal limits, except as 

documented in this note.





Current Medications


Current Medications





Current Medications








 Medications


  (Trade)  Dose


 Ordered  Sig/Mikey  Start Time


 Stop Time Status Last Admin


Dose Admin


 


 Magnesium Sulfate  50 ml @ 25


 mls/hr  1X  ONCE  8/9/19 21:30


 8/9/19 23:29 DC 8/9/19 22:05


25 MLS/HR


 


 Sodium Chloride  1,000 ml @ 


 1,000 mls/hr  1X  ONCE  8/9/19 20:00


 8/9/19 20:59 DC 8/9/19 20:33


1,000 MLS/HR











Allergies


Allergies





Allergies








Coded Allergies Type Severity Reaction Last Updated Verified


 


  No Known Drug Allergies    6/14/14 No











Physical Exam


Physical Exam





Constitutional: Well developed, well nourished, no acute distress, non-toxic 

appearance. []


HENT: Normocephalic, atraumatic, bilateral external ears normal, oropharynx 

moist, no oral exudates, nose normal. []


Eyes: PERRLA, EOMI, conjunctiva normal, no discharge. [] 


Neck: Normal range of motion, no tenderness, supple, no stridor. [] 


Cardiovascular:Heart rate regular rhythm, no murmur []


Lungs & Thorax:  Bilateral breath sounds clear to auscultation []


Abdomen: Bowel sounds normal, soft, no tenderness, no masses, no pulsatile 

masses. [] 


Skin: Warm, dry, no erythema, no rash. [] 


Back: No tenderness, no CVA tenderness. [] 


Extremities: No tenderness, no cyanosis, no clubbing, ROM intact, no edema. [] 


Neurologic: Alert and oriented X 3, normal motor function, normal sensory 

function, no focal deficits noted. []


Psychologic: Affect normal, judgement normal, mood normal. []





Current Patient Data


Vital Signs





                                   Vital Signs








  Date Time  Temp Pulse Resp B/P (MAP) Pulse Ox O2 Delivery O2 Flow Rate FiO2


 


8/9/19 20:13  73 18  98   


 


8/9/19 19:23 98.1   173/77 (109)  Room Air  





 98.1       








Lab Values





                                Laboratory Tests








Test


 8/9/19


19:26 8/9/19


19:38 8/9/19


19:39 8/9/19


21:20


 


Glucose (Fingerstick)


 103 mg/dL


(70-99)  H 


 


 





 


White Blood Count


 


 5.8 x10^3/uL


(4.0-11.0) 


 





 


Red Blood Count


 


 4.48 x10^6/uL


(3.50-5.40) 


 





 


Hemoglobin


 


 12.6 g/dL


(12.0-15.5) 


 





 


Hematocrit


 


 37.2 %


(36.0-47.0) 


 





 


Mean Corpuscular Volume


 


 83 fL ()


 


 





 


Mean Corpuscular Hemoglobin  28 pg (25-35)    


 


Mean Corpuscular Hemoglobin


Concent 


 34 g/dL


(31-37) 


 





 


Red Cell Distribution Width


 


 16.1 %


(11.5-14.5)  H 


 





 


Platelet Count


 


 269 x10^3/uL


(140-400) 


 





 


Neutrophils (%) (Auto)  66 % (31-73)    


 


Lymphocytes (%) (Auto)  25 % (24-48)    


 


Monocytes (%) (Auto)  8 % (0-9)    


 


Eosinophils (%) (Auto)  1 % (0-3)    


 


Basophils (%) (Auto)  0 % (0-3)    


 


Neutrophils # (Auto)


 


 3.8 x10^3/uL


(1.8-7.7) 


 





 


Lymphocytes # (Auto)


 


 1.5 x10^3/uL


(1.0-4.8) 


 





 


Monocytes # (Auto)


 


 0.5 x10^3/uL


(0.0-1.1) 


 





 


Eosinophils # (Auto)


 


 0.0 x10^3/uL


(0.0-0.7) 


 





 


Basophils # (Auto)


 


 0.0 x10^3/uL


(0.0-0.2) 


 





 


Sodium Level


 


 142 mmol/L


(136-145) 


 





 


Potassium Level


 


 3.0 mmol/L


(3.5-5.1)  L 


 





 


Chloride Level


 


 103 mmol/L


() 


 





 


Carbon Dioxide Level


 


 27 mmol/L


(21-32) 


 





 


Anion Gap  12 (6-14)    


 


Blood Urea Nitrogen


 


 11 mg/dL


(7-20) 


 





 


Creatinine


 


 1.1 mg/dL


(0.6-1.0)  H 


 





 


Estimated GFR


(Cockcroft-Gault) 


 49.1  


 


 





 


BUN/Creatinine Ratio  10 (6-20)    


 


Glucose Level


 


 107 mg/dL


(70-99)  H 


 





 


Lactic Acid Level


 


 1.4 mmol/L


(0.4-2.0) 


 





 


Calcium Level


 


 8.5 mg/dL


(8.5-10.1) 


 





 


Magnesium Level


 


 1.4 mg/dL


(1.8-2.4)  L 


 





 


Total Bilirubin


 


 0.5 mg/dL


(0.2-1.0) 


 





 


Aspartate Amino Transferase


(AST) 


 66 U/L (15-37)


H 


 





 


Alanine Aminotransferase (ALT)


 


 33 U/L (14-59)


 


 





 


Alkaline Phosphatase


 


 80 U/L


() 


 





 


Creatine Kinase


 


 64 U/L


() 


 





 


Creatine Kinase MB (Mass)


 


 1.0 ng/mL


(0.0-3.6) 


 





 


Creatine Kinase MB Relative


Index 


  % (0-4)  


 


 





 


Troponin I Quantitative


 


 < 0.017 ng/mL


(0.000-0.055) 


 





 


Total Protein


 


 7.1 g/dL


(6.4-8.2) 


 





 


Albumin


 


 2.9 g/dL


(3.4-5.0)  L 


 





 


Albumin/Globulin Ratio


 


 0.7 (1.0-1.7)


L 


 





 


Prothrombin Time


 


 


 12.9 SEC


(11.7-14.0) 





 


Prothrombin Time INR   1.0 (0.8-1.1)   


 


Activated Partial


Thromboplast Time 


 


 26 SEC (24-38)


 





 


Urine Color    Yellow  


 


Urine Clarity    Turbid  


 


Urine pH    7.0  


 


Urine Specific Gravity    <=1.005  


 


Urine Protein


 


 


 


 Negative mg/dL


(NEG-TRACE)


 


Urine Glucose (UA)


 


 


 


 Negative mg/dL


(NEG)


 


Urine Ketones (Stick)


 


 


 


 Negative mg/dL


(NEG)


 


Urine Blood    Small (NEG)  


 


Urine Nitrite


 


 


 


 Negative (NEG)





 


Urine Bilirubin


 


 


 


 Negative (NEG)





 


Urine Urobilinogen Dipstick


 


 


 


 1.0 mg/dL (0.2


mg/dL)


 


Urine Leukocyte Esterase    Large (NEG)  


 


Urine RBC


 


 


 


 Occ /HPF (0-2)





 


Urine WBC


 


 


 


 Tntc /HPF


(0-4)


 


Urine Squamous Epithelial


Cells 


 


 


 None /LPF  





 


Urine Bacteria


 


 


 


 Moderate /HPF


(0-FEW)


 


Urine Mucus    Slight /LPF  





                                Laboratory Tests


8/9/19 19:38








                                Laboratory Tests


8/9/19 19:38














EKG


EKG


@1927 NSR at 76bpm, NO ST elevation, t wave inversion noted to V3.





Radiology/Procedures


Radiology/Procedures


PROCEDURE: CT HEAD WO CONTRAST





PQRS Compliance Statement:


 


One or more of the following individualized dose reduction techniques were


utilized for this examination:  


1. Automated exposure control  


2. Adjustment of the mA and/or kV according to patient size  


3. Use of iterative reconstruction technique


 


CT head without contrast 8/9/2019 7:36 PM


 


INDICATION: Speech changes and weakness in the right hand


 


COMPARISON: None available


 


TECHNIQUE: Multiple axial CT images of the head were obtained from skull 


base through the vertex without intravenous contrast. 


 


FINDINGS:


 


Head:


 


Ventricles, sulci and basal cisterns are within normal limits. Remote 


ischemic changes are identified with encephalomalacia involving the right 


middle and inferior frontal gyri. Low-attenuation in the periventricular 


white matter is suggestive of chronic small vessel ischemic changes. There


is no hydrocephalus. Gray-white matter differentiation is normal. There is


no acute intracranial hemorrhage. There is no mass, mass effect or midline


shift. Posterior fossa is normal in appearance.


 


Visualized portions of the orbits are normal. Paranasal sinuses are well 


aerated. Mastoid air cells are well aerated. Scalp and calvaria are 


normal.


 


 


IMPRESSION:


 


No acute intracranial hemorrhage.


 


Remote ischemic changes are identified in the right middle and inferior 


frontal gyri.


 


Electronically signed by: Delma Mendiola MD (8/9/2019 8:40 PM) 


[facility]








PROCEDURE: CHEST PA & LATERAL





Chest radiograph 8/9/2019 7:36 PM


 


INDICATION: TIA symptoms


 


COMPARISON: December 21, 2012


 


TECHNIQUE: Frontal and lateral views of the chest are provided.


 


FINDINGS:


 


The cardiomediastinal silhouette is within normal limits.


 


There are no pleural effusions. There is no pulmonary vascular congestion.


There is no pneumothorax.


 


The lungs are clear. Pulmonary emphysematous changes are present.


 


No significant osseous abnormality is identified.


 


IMPRESSION:


 


COPD changes without acute cardiopulmonary process.


 


Electronically signed by: Delma Mendiola MD (8/9/2019 9:28 PM) 


[facility]





Course & Med Decision Making


Course & Med Decision Making


Pertinent Labs and Imaging studies reviewed. (See chart for details)





[]





Dragon Disclaimer


Dragon Disclaimer


This electronic medical record was generated, in whole or in part, using a voice

 recognition dictation system.





Departure


Departure


Impression:  


   Primary Impression:  


   TIA (transient ischemic attack)


   Additional Impressions:  


   Hypokalemia


   Hypomagnesemia


   UTI (urinary tract infection)


Disposition:  09 ADMITTED AS INPATIENT


Admitting Physician:  KARLA ([facility])


Condition:  STABLE


Referrals:  


EN BRITTON MD (PCP)





NIHSS Stroke Scale


NIH Stroke Scale:  








NIH Stroke Scale Response (Comments) Value


 


Level of Consciousness:                 0 Alert/Responsive 0


 


LOC Questions:                          0 Answers both correctly 0


 


LOC Commands:                           0 Performs both tasks 0


 


Best Gaze:                              0 Normal 0


 


Visual:                                 0 No visual loss 0


 


Facial Palsy:                           0 Normal, symmetrical 0


 


Motor - Left Arm                        0 No drift 0


 


Motor - Right Arm                       0 No drift 0


 


Motor - Left Leg                        0 No drift 0


 


Motor: Right Leg                        0 No drift 0


 


Limb Ataxia:                            0 Absent 0


 


Sensory:                                0 No loss 0


 


Best Language:                          0 Normal 0


 


Dysathria:                              0 Normal 0


 


Extinction and Inattention:             0 Normal 0


 


Total  0











Problem Qualifiers








   Additional Impressions:  


   UTI (urinary tract infection)


   Urinary tract infection type:  acute cystitis  Hematuria presence:  without 

   hematuria  Qualified Codes:  N30.00 - Acute cystitis without hematuria








HARRIETT RAYMOND DO              Aug 9, 2019 21:45

## 2019-08-09 NOTE — RAD
PQRS Compliance Statement:

 

One or more of the following individualized dose reduction techniques were

utilized for this examination:  

1. Automated exposure control  

2. Adjustment of the mA and/or kV according to patient size  

3. Use of iterative reconstruction technique

 

CT head without contrast 8/9/2019 7:36 PM

 

INDICATION: Speech changes and weakness in the right hand

 

COMPARISON: None available

 

TECHNIQUE: Multiple axial CT images of the head were obtained from skull 

base through the vertex without intravenous contrast. 

 

FINDINGS:

 

Head:

 

Ventricles, sulci and basal cisterns are within normal limits. Remote 

ischemic changes are identified with encephalomalacia involving the right 

middle and inferior frontal gyri. Low-attenuation in the periventricular 

white matter is suggestive of chronic small vessel ischemic changes. There

is no hydrocephalus. Gray-white matter differentiation is normal. There is

no acute intracranial hemorrhage. There is no mass, mass effect or midline

shift. Posterior fossa is normal in appearance.

 

Visualized portions of the orbits are normal. Paranasal sinuses are well 

aerated. Mastoid air cells are well aerated. Scalp and calvaria are 

normal.

 

 

IMPRESSION:

 

No acute intracranial hemorrhage.

 

Remote ischemic changes are identified in the right middle and inferior 

frontal gyri.

 

Electronically signed by: Delma Mendiola MD (8/9/2019 8:40 PM) 

[facility]

## 2019-08-10 VITALS — DIASTOLIC BLOOD PRESSURE: 75 MMHG | SYSTOLIC BLOOD PRESSURE: 178 MMHG

## 2019-08-10 VITALS — DIASTOLIC BLOOD PRESSURE: 78 MMHG | SYSTOLIC BLOOD PRESSURE: 176 MMHG

## 2019-08-10 VITALS — SYSTOLIC BLOOD PRESSURE: 181 MMHG | DIASTOLIC BLOOD PRESSURE: 71 MMHG

## 2019-08-10 VITALS — SYSTOLIC BLOOD PRESSURE: 178 MMHG | DIASTOLIC BLOOD PRESSURE: 70 MMHG

## 2019-08-10 VITALS — SYSTOLIC BLOOD PRESSURE: 179 MMHG | DIASTOLIC BLOOD PRESSURE: 76 MMHG

## 2019-08-10 VITALS — SYSTOLIC BLOOD PRESSURE: 185 MMHG | DIASTOLIC BLOOD PRESSURE: 75 MMHG

## 2019-08-10 LAB
% BANDS: 1 % (ref 0–9)
% LYMPHS: 33 % (ref 24–48)
% MONOS: 11 % (ref 0–10)
% SEGS: 55 % (ref 35–66)
ALBUMIN SERPL-MCNC: 2.7 G/DL (ref 3.4–5)
ALBUMIN/GLOB SERPL: 0.7 {RATIO} (ref 1–1.7)
ALP SERPL-CCNC: 68 U/L (ref 46–116)
ALT SERPL-CCNC: 29 U/L (ref 14–59)
ANION GAP SERPL CALC-SCNC: 12 MMOL/L (ref 6–14)
AST SERPL-CCNC: 58 U/L (ref 15–37)
BASOPHILS # BLD AUTO: 0 X10^3/UL (ref 0–0.2)
BASOPHILS NFR BLD: 1 % (ref 0–3)
BILIRUB SERPL-MCNC: 0.3 MG/DL (ref 0.2–1)
BUN SERPL-MCNC: 10 MG/DL (ref 7–20)
BUN/CREAT SERPL: 13 (ref 6–20)
CALCIUM SERPL-MCNC: 8.3 MG/DL (ref 8.5–10.1)
CHLORIDE SERPL-SCNC: 108 MMOL/L (ref 98–107)
CHOLEST SERPL-MCNC: 237 MG/DL (ref 0–200)
CHOLEST/HDLC SERPL: 7.2 {RATIO}
CO2 SERPL-SCNC: 25 MMOL/L (ref 21–32)
CREAT SERPL-MCNC: 0.8 MG/DL (ref 0.6–1)
EOSINOPHIL NFR BLD: 0 X10^3/UL (ref 0–0.7)
EOSINOPHIL NFR BLD: 1 % (ref 0–3)
ERYTHROCYTE [DISTWIDTH] IN BLOOD BY AUTOMATED COUNT: 16.3 % (ref 11.5–14.5)
GFR SERPLBLD BASED ON 1.73 SQ M-ARVRAT: 70.9 ML/MIN
GLOBULIN SER-MCNC: 3.9 G/DL (ref 2.2–3.8)
GLUCOSE SERPL-MCNC: 90 MG/DL (ref 70–99)
HCT VFR BLD CALC: 37.3 % (ref 36–47)
HDLC SERPL-MCNC: 33 MG/DL (ref 40–60)
HGB BLD-MCNC: 12.3 G/DL (ref 12–15.5)
LDLC: 179 MG/DL (ref 0–100)
LYMPHOCYTES # BLD: 1.2 X10^3/UL (ref 1–4.8)
LYMPHOCYTES NFR BLD AUTO: 31 % (ref 24–48)
MCH RBC QN AUTO: 28 PG (ref 25–35)
MCHC RBC AUTO-ENTMCNC: 33 G/DL (ref 31–37)
MCV RBC AUTO: 84 FL (ref 79–100)
MONO #: 0.3 X10^3/UL (ref 0–1.1)
MONOCYTES NFR BLD: 9 % (ref 0–9)
NEUT #: 2.2 X10^3/UL (ref 1.8–7.7)
NEUTROPHILS NFR BLD AUTO: 58 % (ref 31–73)
NRBC # BLD MANUAL: 1 10*3/UL
PLATELET # BLD AUTO: 234 X10^3/UL (ref 140–400)
PLATELET # BLD EST: ADEQUATE 10*3/UL
POTASSIUM SERPL-SCNC: 3.2 MMOL/L (ref 3.5–5.1)
PROT SERPL-MCNC: 6.6 G/DL (ref 6.4–8.2)
RBC # BLD AUTO: 4.43 X10^6/UL (ref 3.5–5.4)
SODIUM SERPL-SCNC: 145 MMOL/L (ref 136–145)
TRIGL SERPL-MCNC: 125 MG/DL (ref 0–150)
VLDLC: 25 MG/DL (ref 0–40)
WBC # BLD AUTO: 3.7 X10^3/UL (ref 4–11)

## 2019-08-10 RX ADMIN — ATORVASTATIN CALCIUM SCH MG: 40 TABLET, FILM COATED ORAL at 21:37

## 2019-08-10 RX ADMIN — IPRATROPIUM BROMIDE AND ALBUTEROL SULFATE SCH ML: .5; 3 SOLUTION RESPIRATORY (INHALATION) at 18:15

## 2019-08-10 RX ADMIN — IPRATROPIUM BROMIDE AND ALBUTEROL SULFATE SCH ML: .5; 3 SOLUTION RESPIRATORY (INHALATION) at 23:35

## 2019-08-10 RX ADMIN — CARVEDILOL SCH MG: 3.12 TABLET, FILM COATED ORAL at 00:37

## 2019-08-10 RX ADMIN — CITALOPRAM HYDROBROMIDE SCH MG: 20 TABLET ORAL at 12:06

## 2019-08-10 RX ADMIN — ASPIRIN SCH MG: 325 TABLET, DELAYED RELEASE ORAL at 16:25

## 2019-08-10 RX ADMIN — PANTOPRAZOLE SODIUM SCH MG: 40 TABLET, DELAYED RELEASE ORAL at 12:06

## 2019-08-10 RX ADMIN — CARVEDILOL SCH MG: 3.12 TABLET, FILM COATED ORAL at 07:52

## 2019-08-10 RX ADMIN — IPRATROPIUM BROMIDE AND ALBUTEROL SULFATE SCH ML: .5; 3 SOLUTION RESPIRATORY (INHALATION) at 15:49

## 2019-08-10 RX ADMIN — CEFTRIAXONE SCH GM: 1 INJECTION, POWDER, FOR SOLUTION INTRAMUSCULAR; INTRAVENOUS at 21:51

## 2019-08-10 RX ADMIN — CARVEDILOL SCH MG: 3.12 TABLET, FILM COATED ORAL at 16:26

## 2019-08-10 NOTE — EKG
[facility]

              [address] [place] [address]

Test Date:    2019               Test Time:    19:27:14

Pat Name:     CAMERON MUNOZ            Department:   

Patient ID:   [id number]           Room:         2 

Gender:       F                        Technician:   

:          [id number]               Requested By: HARRIETT RAYMOND

Order Number: [id number]           Reading MD:   Shree Mcclelland MD

                                 Measurements

Intervals                              Axis          

Rate:         76                       P:            52

DE:           164                      QRS:          32

QRSD:         98                       T:            69

QT:           418                                    

QTc:          475                                    

                           Interpretive Statements

SINUS RHYTHM

NON-SPECIFIC ST/T CHANGES

Electronically Signed On 2019 16:32:18 CDT by Shree Mcclelland MD

## 2019-08-10 NOTE — PDOC2
CONSULT


Date of Consult


Date of Consult


DATE: 8/10/19 


TIME: 15:20





Reason for Consult


Reason for Consult:


Slurring of speechSlurring of speech





History of Present Illness


Reason for Visit:








This patient presented to emergency room not feeling well for over 1 day.  

Patient was having some problems off slurring of speech.  Patient was at [facility] he had some worsening of symptoms.  Patient had improvement of symptoms on 

presentation he denied any complaint of numbness on the face or difficulty 

speaking.  Patient denied any focal extremity weakness.





Past Medical History


Cardiovascular:  CAD


CENTRAL NERVOUS SYSTEM:  Periperal neuropathy


GI:  Constipation, Hemorrhoids


Heme/Onc:  No pertinent hx


Hepatobiliary:  No pertinent hx


Rheumatologic:  Other (arthritis)


Renal/:  No pertinent hx


Dermatology:  Other (LUPUS)





Family History


Family History:  Hypertension





Social History


Quit (qiut 20 yrs ago)


ALCOHOL:  none


Drugs:  None





Current Problem List


Problem List


Problems


Medical Problems:


(1) UTI (urinary tract infection)


Status: Acute  











Current Medications


Current Medications





Current Medications


Sodium Chloride 1,000 ml @  1,000 mls/hr 1X  ONCE IV  Last administered on 

8/9/19at 20:33;  Start 8/9/19 at 20:00;  Stop 8/9/19 at 20:59;  Status DC


Aspirin (Children'S Aspirin) 162 mg 1X  ONCE PO  Last administered on 8/9/19at 

22:05;  Start 8/9/19 at 21:45;  Stop 8/9/19 at 21:46;  Status DC


Potassium Chloride (Klor-Con) 40 meq 1X  ONCE PO  Last administered on 8/9/19at 

22:05;  Start 8/9/19 at 21:45;  Stop 8/9/19 at 21:46;  Status DC


Magnesium Sulfate 50 ml @ 25 mls/hr 1X  ONCE IV  Last administered on 8/9/19at 

22:05;  Start 8/9/19 at 21:30;  Stop 8/9/19 at 23:29;  Status DC


Ceftriaxone Sodium (Rocephin) 1 gm 1X  ONCE IVP  Last administered on 8/9/19at 

22:05;  Start 8/9/19 at 22:00;  Stop 8/9/19 at 22:01;  Status DC


Ondansetron HCl (Zofran) 4 mg PRN Q8HRS  PRN IV NAUSEA/VOMITING;  Start 8/9/19 

at 22:00;  Stop 8/10/19 at 21:59


Alprazolam (Xanax) 0.25 mg BID PO ;  Start 8/10/19 at 00:00;  Stop 8/10/19 at 

00:05;  Status DC


Carvedilol (Coreg) 3.125 mg BIDWMEALS PO  Last administered on 8/10/19at 07:52; 

Start 8/10/19 at 00:00


Alprazolam (Xanax) 0.25 mg PRN BID  PRN PO ANXIETY / AGITATION Last administered

on 8/10/19at 00:37;  Start 8/10/19 at 00:15


Alprazolam (Xanax) 0.25 mg BID PO  Last administered on 8/10/19at 07:52;  Start 

8/10/19 at 00:05


Aspirin (Ecotrin) 81 mg DAILY PO  Last administered on 8/10/19at 12:06;  Start 

8/10/19 at 11:30;  Stop 8/10/19 at 14:14;  Status DC


Atorvastatin Calcium (Lipitor) 40 mg HS PO ;  Start 8/10/19 at 21:00


Citalopram Hydrobromide (CeleXA) 20 mg DAILY PO  Last administered on 8/10/19at 

12:06;  Start 8/10/19 at 12:00


Pantoprazole Sodium (Protonix) 40 mg DAILYAC PO  Last administered on 8/10/19at 

12:06;  Start 8/10/19 at 11:30


Aspirin (Ecotrin) 325 mg DAILYWBKFT PO ;  Start 8/10/19 at 15:00


Atorvastatin Calcium (Lipitor) 40 mg QHS PO ;  Start 8/10/19 at 21:00


Sodium Chloride (Normal Saline Flush) 3 ml QSHIFT  PRN IV AFTER MEDS AND BLOOD 

DRAWS;  Start 8/10/19 at 14:15


Ondansetron HCl (Zofran) 4 mg PRN Q4HRS  PRN IV NAUSEA/VOMITING;  Start 8/10/19 

at 14:15


Acetaminophen (Tylenol) 650 mg PRN Q4HRS  PRN PO TEMP OVER 100.4F OR MILD PAIN; 

Start 8/10/19 at 14:15


Al Hydroxide/Mg Hydroxide (Mylanta Plus Xs) 30 ml PRN DAILY  PRN PO HEARTBURN / 

GAS;  Start 8/10/19 at 14:15


Clonidine HCl (Catapres) 0.1 mg PRN Q6HRS  PRN PO SBP>160 OR DBP>90;  Start 

8/10/19 at 14:15


Docusate Sodium (Colace) 100 mg PRN BID  PRN PO CONSTIPATION;  Start 8/10/19 at 

14:15


Albuterol/ Ipratropium (Duoneb) 3 ml Q4H NEB ;  Start 8/10/19 at 14:15


Guaifenesin (Robitussin) 200 mg PRN Q4HRS  PRN PO COUGH;  Start 8/10/19 at 14:15





Active Scripts


Active


Reported


Citalopram Hbr (Citalopram Hydrobromide) 20 Mg Tablet 1 Tab PO DAILY


[atrovent hfa]   17 Mcg  QID


Xanax (Alprazolam) 0.5 Mg Tablet 0.5-1 Tab PO BID


Coreg  ** (Carvedilol) 3.125 Mg Tablet 1 Tab PO BID


Nexium Capsule (Esomeprazole Magnesium) 40 Mg Capsule.dr 40 Mg PO DAILYAC


Atorvastatin Calcium 40 Mg Tablet 40 Mg PO HS


Aspir 81 (Aspirin) 81 Mg Tablet.dr 81 Mg PO DAILY





Allergies


Allergies:  


Coded Allergies:  


     clopidogrel (Verified  Allergy, Intermediate, Rash, 8/10/19)





Physical Exam


Physical Exam


General no acute distress.


HEENT: Normocephalic and atraumatic. 


NECK: Supple without bruit


Respiratory: Clear to auscultation bilaterally


Heart: Regular rate and rhythm, S1S2 normal


NEUROLOGIC: Mental status Alert oriented.


Cranial nerve equally reactive pupils, and intact extraocular movements. 


No facial asymmetry. 


Palate elevates and tongue protrudes in midline. 


Reflexes are 1-2 with flexor plantar responses. 


Coordination Not able.


Strength Diffuse weakness not participating


Sensory exam is intact for light touch and pinprick.


Gait in bed.





Vitals


VITALS





Vital Signs








  Date Time  Temp Pulse Resp B/P (MAP) Pulse Ox O2 Delivery O2 Flow Rate FiO2


 


8/10/19 11:47 98.7 68 18 178/70 (106) 96 Room Air  





 98.7       











Labs


Labs





Laboratory Tests








Test


 8/9/19


19:26 8/9/19


19:38 8/9/19


19:39 8/9/19


21:20


 


Glucose (Fingerstick)


 103 mg/dL


(70-99) 


 


 





 


White Blood Count


 


 5.8 x10^3/uL


(4.0-11.0) 


 





 


Red Blood Count


 


 4.48 x10^6/uL


(3.50-5.40) 


 





 


Hemoglobin


 


 12.6 g/dL


(12.0-15.5) 


 





 


Hematocrit


 


 37.2 %


(36.0-47.0) 


 





 


Mean Corpuscular Volume  83 fL ()   


 


Mean Corpuscular Hemoglobin  28 pg (25-35)   


 


Mean Corpuscular Hemoglobin


Concent 


 34 g/dL


(31-37) 


 





 


Red Cell Distribution Width


 


 16.1 %


(11.5-14.5) 


 





 


Platelet Count


 


 269 x10^3/uL


(140-400) 


 





 


Neutrophils (%) (Auto)  66 % (31-73)   


 


Lymphocytes (%) (Auto)  25 % (24-48)   


 


Monocytes (%) (Auto)  8 % (0-9)   


 


Eosinophils (%) (Auto)  1 % (0-3)   


 


Basophils (%) (Auto)  0 % (0-3)   


 


Neutrophils # (Auto)


 


 3.8 x10^3/uL


(1.8-7.7) 


 





 


Lymphocytes # (Auto)


 


 1.5 x10^3/uL


(1.0-4.8) 


 





 


Monocytes # (Auto)


 


 0.5 x10^3/uL


(0.0-1.1) 


 





 


Eosinophils # (Auto)


 


 0.0 x10^3/uL


(0.0-0.7) 


 





 


Basophils # (Auto)


 


 0.0 x10^3/uL


(0.0-0.2) 


 





 


Sodium Level


 


 142 mmol/L


(136-145) 


 





 


Potassium Level


 


 3.0 mmol/L


(3.5-5.1) 


 





 


Chloride Level


 


 103 mmol/L


() 


 





 


Carbon Dioxide Level


 


 27 mmol/L


(21-32) 


 





 


Anion Gap  12 (6-14)   


 


Blood Urea Nitrogen


 


 11 mg/dL


(7-20) 


 





 


Creatinine


 


 1.1 mg/dL


(0.6-1.0) 


 





 


Estimated GFR


(Cockcroft-Gault) 


 49.1 


 


 





 


BUN/Creatinine Ratio  10 (6-20)   


 


Glucose Level


 


 107 mg/dL


(70-99) 


 





 


Lactic Acid Level


 


 1.4 mmol/L


(0.4-2.0) 


 





 


Calcium Level


 


 8.5 mg/dL


(8.5-10.1) 


 





 


Magnesium Level


 


 1.4 mg/dL


(1.8-2.4) 


 





 


Total Bilirubin


 


 0.5 mg/dL


(0.2-1.0) 


 





 


Aspartate Amino Transf


(AST/SGOT) 


 66 U/L (15-37) 


 


 





 


Alanine Aminotransferase


(ALT/SGPT) 


 33 U/L (14-59) 


 


 





 


Alkaline Phosphatase


 


 80 U/L


() 


 





 


Creatine Kinase


 


 64 U/L


() 


 





 


Creatine Kinase MB (Mass)


 


 1.0 ng/mL


(0.0-3.6) 


 





 


Creatine Kinase MB Relative


Index 


  % (0-4) 


 


 





 


Troponin I Quantitative


 


 < 0.017 ng/mL


(0.000-0.055) 


 





 


Total Protein


 


 7.1 g/dL


(6.4-8.2) 


 





 


Albumin


 


 2.9 g/dL


(3.4-5.0) 


 





 


Albumin/Globulin Ratio  0.7 (1.0-1.7)   


 


Prothrombin Time


 


 


 12.9 SEC


(11.7-14.0) 





 


Prothromb Time International


Ratio 


 


 1.0 (0.8-1.1) 


 





 


Activated Partial


Thromboplast Time 


 


 26 SEC (24-38) 


 





 


Urine Color    Yellow 


 


Urine Clarity    Turbid 


 


Urine pH    7.0 


 


Urine Specific Gravity    <=1.005 


 


Urine Protein


 


 


 


 Negative mg/dL


(NEG-TRACE)


 


Urine Glucose (UA)


 


 


 


 Negative mg/dL


(NEG)


 


Urine Ketones (Stick)


 


 


 


 Negative mg/dL


(NEG)


 


Urine Blood    Small (NEG) 


 


Urine Nitrite    Negative (NEG) 


 


Urine Bilirubin    Negative (NEG) 


 


Urine Urobilinogen Dipstick


 


 


 


 1.0 mg/dL (0.2


mg/dL)


 


Urine Leukocyte Esterase    Large (NEG) 


 


Urine RBC    Occ /HPF (0-2) 


 


Urine WBC


 


 


 


 Tntc /HPF


(0-4)


 


Urine Squamous Epithelial


Cells 


 


 


 None /LPF 





 


Urine Bacteria


 


 


 


 Moderate /HPF


(0-FEW)


 


Urine Mucus    Slight /LPF 


 


Test


 8/10/19


01:00 8/10/19


03:30 


 





 


Troponin I Quantitative


 < 0.017 ng/mL


(0.000-0.055) < 0.017 ng/mL


(0.000-0.055) 


 





 


White Blood Count


 


 3.7 x10^3/uL


(4.0-11.0) 


 





 


Red Blood Count


 


 4.43 x10^6/uL


(3.50-5.40) 


 





 


Hemoglobin


 


 12.3 g/dL


(12.0-15.5) 


 





 


Hematocrit


 


 37.3 %


(36.0-47.0) 


 





 


Mean Corpuscular Volume  84 fL ()   


 


Mean Corpuscular Hemoglobin  28 pg (25-35)   


 


Mean Corpuscular Hemoglobin


Concent 


 33 g/dL


(31-37) 


 





 


Red Cell Distribution Width


 


 16.3 %


(11.5-14.5) 


 





 


Platelet Count


 


 234 x10^3/uL


(140-400) 


 





 


Neutrophils (%) (Auto)  58 % (31-73)   


 


Lymphocytes (%) (Auto)  31 % (24-48)   


 


Monocytes (%) (Auto)  9 % (0-9)   


 


Eosinophils (%) (Auto)  1 % (0-3)   


 


Basophils (%) (Auto)  1 % (0-3)   


 


Neutrophils # (Auto)


 


 2.2 x10^3/uL


(1.8-7.7) 


 





 


Lymphocytes # (Auto)


 


 1.2 x10^3/uL


(1.0-4.8) 


 





 


Monocytes # (Auto)


 


 0.3 x10^3/uL


(0.0-1.1) 


 





 


Eosinophils # (Auto)


 


 0.0 x10^3/uL


(0.0-0.7) 


 





 


Basophils # (Auto)


 


 0.0 x10^3/uL


(0.0-0.2) 


 





 


Segmented Neutrophils %  55 % (35-66)   


 


Band Neutrophils %  1 % (0-9)   


 


Lymphocytes %  33 % (24-48)   


 


Monocytes %  11 % (0-10)   


 


Nucleated Red Blood Cells  1   


 


Platelet Estimate


 


 Adequate


(ADEQUATE) 


 





 


Large Platelets  Occ   


 


Sodium Level


 


 145 mmol/L


(136-145) 


 





 


Potassium Level


 


 3.2 mmol/L


(3.5-5.1) 


 





 


Chloride Level


 


 108 mmol/L


() 


 





 


Carbon Dioxide Level


 


 25 mmol/L


(21-32) 


 





 


Anion Gap  12 (6-14)   


 


Blood Urea Nitrogen


 


 10 mg/dL


(7-20) 


 





 


Creatinine


 


 0.8 mg/dL


(0.6-1.0) 


 





 


Estimated GFR


(Cockcroft-Gault) 


 70.9 


 


 





 


BUN/Creatinine Ratio  13 (6-20)   


 


Glucose Level


 


 90 mg/dL


(70-99) 


 





 


Calcium Level


 


 8.3 mg/dL


(8.5-10.1) 


 





 


Total Bilirubin


 


 0.3 mg/dL


(0.2-1.0) 


 





 


Aspartate Amino Transf


(AST/SGOT) 


 58 U/L (15-37) 


 


 





 


Alanine Aminotransferase


(ALT/SGPT) 


 29 U/L (14-59) 


 


 





 


Alkaline Phosphatase


 


 68 U/L


() 


 





 


Total Protein


 


 6.6 g/dL


(6.4-8.2) 


 





 


Albumin


 


 2.7 g/dL


(3.4-5.0) 


 





 


Albumin/Globulin Ratio  0.7 (1.0-1.7)   


 


Triglycerides Level


 


 125 mg/dL


(0-150) 


 





 


Cholesterol Level


 


 237 mg/dL


(0-200) 


 





 


LDL Cholesterol, Calculated


 


 179 mg/dL


(0-100) 


 





 


VLDL Cholesterol, Calculated


 


 25 mg/dL


(0-40) 


 





 


Non-HDL Cholesterol Calculated


 


 204 mg/dL


(0-129) 


 





 


HDL Cholesterol


 


 33 mg/dL


(40-60) 


 





 


Cholesterol/HDL Ratio  7.2   








Laboratory Tests








Test


 8/9/19


19:26 8/9/19


19:38 8/9/19


19:39 8/9/19


21:20


 


Glucose (Fingerstick)


 103 mg/dL


(70-99) 


 


 





 


White Blood Count


 


 5.8 x10^3/uL


(4.0-11.0) 


 





 


Red Blood Count


 


 4.48 x10^6/uL


(3.50-5.40) 


 





 


Hemoglobin


 


 12.6 g/dL


(12.0-15.5) 


 





 


Hematocrit


 


 37.2 %


(36.0-47.0) 


 





 


Mean Corpuscular Volume  83 fL ()   


 


Mean Corpuscular Hemoglobin  28 pg (25-35)   


 


Mean Corpuscular Hemoglobin


Concent 


 34 g/dL


(31-37) 


 





 


Red Cell Distribution Width


 


 16.1 %


(11.5-14.5) 


 





 


Platelet Count


 


 269 x10^3/uL


(140-400) 


 





 


Neutrophils (%) (Auto)  66 % (31-73)   


 


Lymphocytes (%) (Auto)  25 % (24-48)   


 


Monocytes (%) (Auto)  8 % (0-9)   


 


Eosinophils (%) (Auto)  1 % (0-3)   


 


Basophils (%) (Auto)  0 % (0-3)   


 


Neutrophils # (Auto)


 


 3.8 x10^3/uL


(1.8-7.7) 


 





 


Lymphocytes # (Auto)


 


 1.5 x10^3/uL


(1.0-4.8) 


 





 


Monocytes # (Auto)


 


 0.5 x10^3/uL


(0.0-1.1) 


 





 


Eosinophils # (Auto)


 


 0.0 x10^3/uL


(0.0-0.7) 


 





 


Basophils # (Auto)


 


 0.0 x10^3/uL


(0.0-0.2) 


 





 


Sodium Level


 


 142 mmol/L


(136-145) 


 





 


Potassium Level


 


 3.0 mmol/L


(3.5-5.1) 


 





 


Chloride Level


 


 103 mmol/L


() 


 





 


Carbon Dioxide Level


 


 27 mmol/L


(21-32) 


 





 


Anion Gap  12 (6-14)   


 


Blood Urea Nitrogen


 


 11 mg/dL


(7-20) 


 





 


Creatinine


 


 1.1 mg/dL


(0.6-1.0) 


 





 


Estimated GFR


(Cockcroft-Gault) 


 49.1 


 


 





 


BUN/Creatinine Ratio  10 (6-20)   


 


Glucose Level


 


 107 mg/dL


(70-99) 


 





 


Lactic Acid Level


 


 1.4 mmol/L


(0.4-2.0) 


 





 


Calcium Level


 


 8.5 mg/dL


(8.5-10.1) 


 





 


Magnesium Level


 


 1.4 mg/dL


(1.8-2.4) 


 





 


Total Bilirubin


 


 0.5 mg/dL


(0.2-1.0) 


 





 


Aspartate Amino Transf


(AST/SGOT) 


 66 U/L (15-37) 


 


 





 


Alanine Aminotransferase


(ALT/SGPT) 


 33 U/L (14-59) 


 


 





 


Alkaline Phosphatase


 


 80 U/L


() 


 





 


Creatine Kinase


 


 64 U/L


() 


 





 


Creatine Kinase MB (Mass)


 


 1.0 ng/mL


(0.0-3.6) 


 





 


Creatine Kinase MB Relative


Index 


  % (0-4) 


 


 





 


Troponin I Quantitative


 


 < 0.017 ng/mL


(0.000-0.055) 


 





 


Total Protein


 


 7.1 g/dL


(6.4-8.2) 


 





 


Albumin


 


 2.9 g/dL


(3.4-5.0) 


 





 


Albumin/Globulin Ratio  0.7 (1.0-1.7)   


 


Prothrombin Time


 


 


 12.9 SEC


(11.7-14.0) 





 


Prothromb Time International


Ratio 


 


 1.0 (0.8-1.1) 


 





 


Activated Partial


Thromboplast Time 


 


 26 SEC (24-38) 


 





 


Urine Color    Yellow 


 


Urine Clarity    Turbid 


 


Urine pH    7.0 


 


Urine Specific Gravity    <=1.005 


 


Urine Protein


 


 


 


 Negative mg/dL


(NEG-TRACE)


 


Urine Glucose (UA)


 


 


 


 Negative mg/dL


(NEG)


 


Urine Ketones (Stick)


 


 


 


 Negative mg/dL


(NEG)


 


Urine Blood    Small (NEG) 


 


Urine Nitrite    Negative (NEG) 


 


Urine Bilirubin    Negative (NEG) 


 


Urine Urobilinogen Dipstick


 


 


 


 1.0 mg/dL (0.2


mg/dL)


 


Urine Leukocyte Esterase    Large (NEG) 


 


Urine RBC    Occ /HPF (0-2) 


 


Urine WBC


 


 


 


 Tntc /HPF


(0-4)


 


Urine Squamous Epithelial


Cells 


 


 


 None /LPF 





 


Urine Bacteria


 


 


 


 Moderate /HPF


(0-FEW)


 


Urine Mucus    Slight /LPF 


 


Test


 8/10/19


01:00 8/10/19


03:30 


 





 


Troponin I Quantitative


 < 0.017 ng/mL


(0.000-0.055) < 0.017 ng/mL


(0.000-0.055) 


 





 


White Blood Count


 


 3.7 x10^3/uL


(4.0-11.0) 


 





 


Red Blood Count


 


 4.43 x10^6/uL


(3.50-5.40) 


 





 


Hemoglobin


 


 12.3 g/dL


(12.0-15.5) 


 





 


Hematocrit


 


 37.3 %


(36.0-47.0) 


 





 


Mean Corpuscular Volume  84 fL ()   


 


Mean Corpuscular Hemoglobin  28 pg (25-35)   


 


Mean Corpuscular Hemoglobin


Concent 


 33 g/dL


(31-37) 


 





 


Red Cell Distribution Width


 


 16.3 %


(11.5-14.5) 


 





 


Platelet Count


 


 234 x10^3/uL


(140-400) 


 





 


Neutrophils (%) (Auto)  58 % (31-73)   


 


Lymphocytes (%) (Auto)  31 % (24-48)   


 


Monocytes (%) (Auto)  9 % (0-9)   


 


Eosinophils (%) (Auto)  1 % (0-3)   


 


Basophils (%) (Auto)  1 % (0-3)   


 


Neutrophils # (Auto)


 


 2.2 x10^3/uL


(1.8-7.7) 


 





 


Lymphocytes # (Auto)


 


 1.2 x10^3/uL


(1.0-4.8) 


 





 


Monocytes # (Auto)


 


 0.3 x10^3/uL


(0.0-1.1) 


 





 


Eosinophils # (Auto)


 


 0.0 x10^3/uL


(0.0-0.7) 


 





 


Basophils # (Auto)


 


 0.0 x10^3/uL


(0.0-0.2) 


 





 


Segmented Neutrophils %  55 % (35-66)   


 


Band Neutrophils %  1 % (0-9)   


 


Lymphocytes %  33 % (24-48)   


 


Monocytes %  11 % (0-10)   


 


Nucleated Red Blood Cells  1   


 


Platelet Estimate


 


 Adequate


(ADEQUATE) 


 





 


Large Platelets  Occ   


 


Sodium Level


 


 145 mmol/L


(136-145) 


 





 


Potassium Level


 


 3.2 mmol/L


(3.5-5.1) 


 





 


Chloride Level


 


 108 mmol/L


() 


 





 


Carbon Dioxide Level


 


 25 mmol/L


(21-32) 


 





 


Anion Gap  12 (6-14)   


 


Blood Urea Nitrogen


 


 10 mg/dL


(7-20) 


 





 


Creatinine


 


 0.8 mg/dL


(0.6-1.0) 


 





 


Estimated GFR


(Cockcroft-Gault) 


 70.9 


 


 





 


BUN/Creatinine Ratio  13 (6-20)   


 


Glucose Level


 


 90 mg/dL


(70-99) 


 





 


Calcium Level


 


 8.3 mg/dL


(8.5-10.1) 


 





 


Total Bilirubin


 


 0.3 mg/dL


(0.2-1.0) 


 





 


Aspartate Amino Transf


(AST/SGOT) 


 58 U/L (15-37) 


 


 





 


Alanine Aminotransferase


(ALT/SGPT) 


 29 U/L (14-59) 


 


 





 


Alkaline Phosphatase


 


 68 U/L


() 


 





 


Total Protein


 


 6.6 g/dL


(6.4-8.2) 


 





 


Albumin


 


 2.7 g/dL


(3.4-5.0) 


 





 


Albumin/Globulin Ratio  0.7 (1.0-1.7)   


 


Triglycerides Level


 


 125 mg/dL


(0-150) 


 





 


Cholesterol Level


 


 237 mg/dL


(0-200) 


 





 


LDL Cholesterol, Calculated


 


 179 mg/dL


(0-100) 


 





 


VLDL Cholesterol, Calculated


 


 25 mg/dL


(0-40) 


 





 


Non-HDL Cholesterol Calculated


 


 204 mg/dL


(0-129) 


 





 


HDL Cholesterol


 


 33 mg/dL


(40-60) 


 





 


Cholesterol/HDL Ratio  7.2   











Assessment/Plan


Assessment/Plan








This patient presented to emergency room not feeling well for over 1 day.  

Patient was having some problems off slurring of speech.  Patient was at [facility] he had some worsening of symptoms.  Patient had improvement of symptoms on 

presentation he denied any complaint of numbness on the face or difficulty 

speaking.  Patient denied any focal extremity weakness.





This patient presented with symptoms of T IA patient is getting further stroke 

evaluation. Does not meet criteria for IV t-PA Patient has past medical history 

of multiple medical problems.  Patient has previous history of ischemic stroke. 

Patient was started on Aspirin non compliance.  Will get carotid Doppler, 2-D 

echo.  Hypomagnesemia on replacement protocol.  PT OT.  statin Continue medical 

management.  Plan discussed with patient, patient's family at bedside in detail











JOSE GUADALUPE CARRASQUILLO MD                   Aug 10, 2019 15:20

## 2019-08-10 NOTE — PDOC1
History and Physical


Date of Admission


Date of Admission


DATE: 8/10/19 


TIME: 10:44





Identification/Chief Complaint


Chief Complaint


Chief Complaint: 





seen in er with new  NEURO SYMPTOMS/DEFICITS, onset 8/9 while shopping at 

[place] experienced an aphasic phase, unable to speak to , and unable to 

enter PIN #   // ON CHECK-OUT CREDIT MACHINE





HAS NOTED 20# WT LOSS AND BLOOD IN STOOLS X SEVERAL MONTHS, NO WORK-UP TO DATE





Past Medical History


Past Medical History:  Anxiety, COPD, GERD, Hypertension, MI


Additional Past Medical Histor:  DISCOID lupus


Past Surgical History:  Splenectomy, Tonsillectomy, Other


Additional Past Surgical Histo:  3 stents, liver repair, cataract removal


Alcohol Use:  None


Drug Use:  None


 CAD; s/p Resolute LINO to OM2


 PVD








FAMILY HX HTN


Cardiovascular:  CAD


CENTRAL NERVOUS SYSTEM:  Periperal neuropathy


GI:  Constipation, Hemorrhoids


Heme/Onc:  No pertinent hx


Hepatobiliary:  No pertinent hx


Rheumatologic:  Other (arthritis)


Renal/:  No pertinent hx


Dermatology:  Other (LUPUS)





Family History


Family History:  Hypertension





Social History


Smoke:  Quit (qiut 20 yrs ago)


ALCOHOL:  none


Drugs:  None





Current Problem List


Problem List


Problems


Medical Problems:


(1) UTI (urinary tract infection)


Status: Acute  











Current Medications


Current Medications





Current Medications


Sodium Chloride 1,000 ml @  1,000 mls/hr 1X  ONCE IV  Last administered on 

8/9/19at 20:33;  Start 8/9/19 at 20:00;  Stop 8/9/19 at 20:59;  Status DC


Aspirin (Children'S Aspirin) 162 mg 1X  ONCE PO  Last administered on 8/9/19at 

22:05;  Start 8/9/19 at 21:45;  Stop 8/9/19 at 21:46;  Status DC


Potassium Chloride (Klor-Con) 40 meq 1X  ONCE PO  Last administered on 8/9/19at 

22:05;  Start 8/9/19 at 21:45;  Stop 8/9/19 at 21:46;  Status DC


Magnesium Sulfate 50 ml @ 25 mls/hr 1X  ONCE IV  Last administered on 8/9/19at 

22:05;  Start 8/9/19 at 21:30;  Stop 8/9/19 at 23:29;  Status DC


Ceftriaxone Sodium (Rocephin) 1 gm 1X  ONCE IVP  Last administered on 8/9/19at 

22:05;  Start 8/9/19 at 22:00;  Stop 8/9/19 at 22:01;  Status DC


Ondansetron HCl (Zofran) 4 mg PRN Q8HRS  PRN IV NAUSEA/VOMITING;  Start 8/9/19 

at 22:00;  Stop 8/10/19 at 21:59


Alprazolam (Xanax) 0.25 mg BID PO ;  Start 8/10/19 at 00:00;  Stop 8/10/19 at 

00:05;  Status DC


Carvedilol (Coreg) 3.125 mg BIDWMEALS PO  Last administered on 8/10/19at 07:52; 

Start 8/10/19 at 00:00


Alprazolam (Xanax) 0.25 mg PRN BID  PRN PO ANXIETY / AGITATION Last administered

on 8/10/19at 00:37;  Start 8/10/19 at 00:15


Alprazolam (Xanax) 0.25 mg BID PO  Last administered on 8/10/19at 07:52;  Start 

8/10/19 at 00:05





Active Scripts


Active


Keflex (Cephalexin) 500 Mg Capsule 1 Cap PO BID


Reported


[atrovent hfa]   17 Mcg  QID


Xanax (Alprazolam) 0.5 Mg Tablet 0.5-1 Tab PO BID


Coreg  ** (Carvedilol) 3.125 Mg Tablet 1 Tab PO BID


Nexium Capsule (Esomeprazole Magnesium) 40 Mg Capsule. 40 Mg PO DAILYAC


Atorvastatin Calcium 40 Mg Tablet 40 Mg PO HS


Clopidogrel (Clopidogrel Bisulfate) 75 Mg Tablet 75 Mg PO DAILY


Aspir 81 (Aspirin) 81 Mg Tablet.dr 81 Mg PO DAILY





Allergies


Allergies:  


Coded Allergies:  


     clopidogrel (Verified  Allergy, Intermediate, Rash, 8/10/19)





ROS


Review of System





Review of Systems


Review of Systems





Constitutional: Denies fever or chills []


Eyes: Denies change in visual acuity, redness, or eye pain []


HENT: Denies nasal congestion or sore throat []


Respiratory: Denies cough or shortness of breath []


Cardiovascular: No additional information not addressed in HPI []


GI: Denies abdominal pain, nausea, vomiting, bloody stools or diarrhea []


: Denies dysuria or hematuria []


Musculoskeletal: Denies back pain or joint pain []


Integument: Denies rash or skin lesions []


Neurologic: Denies headache, focal weakness or sensory changes []


Endocrine: Denies polyuria or polydipsia []





14 PT  systems were reviewed and found to be within normal limits, except as 

documented


PSYCHOLOGICAL ROS:  YES: Disorientation, Memory difficulties


ALLERGY AND IMMUNOLOGY:  No: Hives, Insect Bite Sensitivity, Itchy/Watery Eyes, 

Nasal Congestion, Post Nasal Drip, Seasonal Allergies, Other


Hematological and Lymphatic:  No: Bleeding Problems, Blood Clots, Blood 

Transfusions, Brusing, Night Sweats, Pallor, Swollen Lymph Nodes, Other


Respiratory:  No: Cough, Hemoptysis, Orthopnea, Pleuritic Pain, Shortness of 

breath, SOB with excertion, Sputum Changes, Stridor, Tachypnea, Wheezing, Other


Gastrointestinal:  No Nausea, No Vomiting, No Abdominal Pain, No Diarrhea, No 

Constipation, No Melena, No Hematochezia, No Other


Musculoskeletal:  Yes Joint Stiffness


Neurological:  Yes Confusion, Yes Impaired Coord/balance, Yes Memory Loss





Physical Exam


Physical Exam





Physical Exam


Physical Exam





Constitutional: Well developed, well nourished, no acute distress, non-toxic a

ppearance. []


HENT: Normocephalic, atraumatic, bilateral external ears normal, oropharynx 

moist, no oral exudates, nose normal. []


Eyes: PERRLA, EOMI, conjunctiva normal, no discharge. [] 


Neck: Normal range of motion, no tenderness, supple, no stridor. [] 


Cardiovascular:Heart rate regular rhythm, no murmur []


Lungs & Thorax:  Bilateral breath sounds clear to auscultation []


Abdomen: Bowel sounds normal, soft, no tenderness, no masses, no pulsatile 

masses. [] 


Skin: Warm, dry, no erythema, no rash. [] 


Back: No tenderness, no CVA tenderness. [] 


Extremities: No tenderness, no cyanosis, no clubbing, ROM intact, no edema. [] 


Neurologic: Alert and oriented X 3, normal motor function, normal sensory 

function, no focal deficits noted. []


Psychologic: Affect normal, judgement normal, mood normal. []


General:  Alert, Oriented X3, Cooperative


HEENT:  Atraumatic


Lungs:  Clear to auscultation


Heart:  RRR, no thrills


Breasts:  Not examined


Abdomen:  Soft


Rectal Exam:  not examined


PELVIC:  Examination not indicated


Neuro:  Normal speech, Cranial nerves 3-12 NL


Psych/Mental Status:  Mental status NL, Mood NL





Vitals


Vitals





Vital Signs








  Date Time  Temp Pulse Resp B/P (MAP) Pulse Ox O2 Delivery O2 Flow Rate FiO2


 


8/10/19 08:00      Room Air  


 


8/10/19 07:52  67  181/71    


 


8/10/19 07:43 98.3  16  97   





 98.3       











Labs


Labs





Laboratory Tests








Test


 8/9/19


19:26 8/9/19


19:38 8/9/19


19:39 8/9/19


21:20


 


Glucose (Fingerstick)


 103 mg/dL


(70-99) 


 


 





 


White Blood Count


 


 5.8 x10^3/uL


(4.0-11.0) 


 





 


Red Blood Count


 


 4.48 x10^6/uL


(3.50-5.40) 


 





 


Hemoglobin


 


 12.6 g/dL


(12.0-15.5) 


 





 


Hematocrit


 


 37.2 %


(36.0-47.0) 


 





 


Mean Corpuscular Volume  83 fL ()   


 


Mean Corpuscular Hemoglobin  28 pg (25-35)   


 


Mean Corpuscular Hemoglobin


Concent 


 34 g/dL


(31-37) 


 





 


Red Cell Distribution Width


 


 16.1 %


(11.5-14.5) 


 





 


Platelet Count


 


 269 x10^3/uL


(140-400) 


 





 


Neutrophils (%) (Auto)  66 % (31-73)   


 


Lymphocytes (%) (Auto)  25 % (24-48)   


 


Monocytes (%) (Auto)  8 % (0-9)   


 


Eosinophils (%) (Auto)  1 % (0-3)   


 


Basophils (%) (Auto)  0 % (0-3)   


 


Neutrophils # (Auto)


 


 3.8 x10^3/uL


(1.8-7.7) 


 





 


Lymphocytes # (Auto)


 


 1.5 x10^3/uL


(1.0-4.8) 


 





 


Monocytes # (Auto)


 


 0.5 x10^3/uL


(0.0-1.1) 


 





 


Eosinophils # (Auto)


 


 0.0 x10^3/uL


(0.0-0.7) 


 





 


Basophils # (Auto)


 


 0.0 x10^3/uL


(0.0-0.2) 


 





 


Sodium Level


 


 142 mmol/L


(136-145) 


 





 


Potassium Level


 


 3.0 mmol/L


(3.5-5.1) 


 





 


Chloride Level


 


 103 mmol/L


() 


 





 


Carbon Dioxide Level


 


 27 mmol/L


(21-32) 


 





 


Anion Gap  12 (6-14)   


 


Blood Urea Nitrogen


 


 11 mg/dL


(7-20) 


 





 


Creatinine


 


 1.1 mg/dL


(0.6-1.0) 


 





 


Estimated GFR


(Cockcroft-Gault) 


 49.1 


 


 





 


BUN/Creatinine Ratio  10 (6-20)   


 


Glucose Level


 


 107 mg/dL


(70-99) 


 





 


Lactic Acid Level


 


 1.4 mmol/L


(0.4-2.0) 


 





 


Calcium Level


 


 8.5 mg/dL


(8.5-10.1) 


 





 


Magnesium Level


 


 1.4 mg/dL


(1.8-2.4) 


 





 


Total Bilirubin


 


 0.5 mg/dL


(0.2-1.0) 


 





 


Aspartate Amino Transf


(AST/SGOT) 


 66 U/L (15-37) 


 


 





 


Alanine Aminotransferase


(ALT/SGPT) 


 33 U/L (14-59) 


 


 





 


Alkaline Phosphatase


 


 80 U/L


() 


 





 


Creatine Kinase


 


 64 U/L


() 


 





 


Creatine Kinase MB (Mass)


 


 1.0 ng/mL


(0.0-3.6) 


 





 


Creatine Kinase MB Relative


Index 


  % (0-4) 


 


 





 


Troponin I Quantitative


 


 < 0.017 ng/mL


(0.000-0.055) 


 





 


Total Protein


 


 7.1 g/dL


(6.4-8.2) 


 





 


Albumin


 


 2.9 g/dL


(3.4-5.0) 


 





 


Albumin/Globulin Ratio  0.7 (1.0-1.7)   


 


Prothrombin Time


 


 


 12.9 SEC


(11.7-14.0) 





 


Prothromb Time International


Ratio 


 


 1.0 (0.8-1.1) 


 





 


Activated Partial


Thromboplast Time 


 


 26 SEC (24-38) 


 





 


Urine Color    Yellow 


 


Urine Clarity    Turbid 


 


Urine pH    7.0 


 


Urine Specific Gravity    <=1.005 


 


Urine Protein


 


 


 


 Negative mg/dL


(NEG-TRACE)


 


Urine Glucose (UA)


 


 


 


 Negative mg/dL


(NEG)


 


Urine Ketones (Stick)


 


 


 


 Negative mg/dL


(NEG)


 


Urine Blood    Small (NEG) 


 


Urine Nitrite    Negative (NEG) 


 


Urine Bilirubin    Negative (NEG) 


 


Urine Urobilinogen Dipstick


 


 


 


 1.0 mg/dL (0.2


mg/dL)


 


Urine Leukocyte Esterase    Large (NEG) 


 


Urine RBC    Occ /HPF (0-2) 


 


Urine WBC


 


 


 


 Tntc /HPF


(0-4)


 


Urine Squamous Epithelial


Cells 


 


 


 None /LPF 





 


Urine Bacteria


 


 


 


 Moderate /HPF


(0-FEW)


 


Urine Mucus    Slight /LPF 


 


Test


 8/10/19


01:00 8/10/19


03:30 


 





 


Troponin I Quantitative


 < 0.017 ng/mL


(0.000-0.055) < 0.017 ng/mL


(0.000-0.055) 


 





 


Triglycerides Level


 


 125 mg/dL


(0-150) 


 





 


Cholesterol Level


 


 237 mg/dL


(0-200) 


 





 


LDL Cholesterol, Calculated


 


 179 mg/dL


(0-100) 


 





 


VLDL Cholesterol, Calculated


 


 25 mg/dL


(0-40) 


 





 


Non-HDL Cholesterol Calculated


 


 204 mg/dL


(0-129) 


 





 


HDL Cholesterol


 


 33 mg/dL


(40-60) 


 





 


Cholesterol/HDL Ratio  7.2   








Laboratory Tests








Test


 8/9/19


19:26 8/9/19


19:38 8/9/19


19:39 8/9/19


21:20


 


Glucose (Fingerstick)


 103 mg/dL


(70-99) 


 


 





 


White Blood Count


 


 5.8 x10^3/uL


(4.0-11.0) 


 





 


Red Blood Count


 


 4.48 x10^6/uL


(3.50-5.40) 


 





 


Hemoglobin


 


 12.6 g/dL


(12.0-15.5) 


 





 


Hematocrit


 


 37.2 %


(36.0-47.0) 


 





 


Mean Corpuscular Volume  83 fL ()   


 


Mean Corpuscular Hemoglobin  28 pg (25-35)   


 


Mean Corpuscular Hemoglobin


Concent 


 34 g/dL


(31-37) 


 





 


Red Cell Distribution Width


 


 16.1 %


(11.5-14.5) 


 





 


Platelet Count


 


 269 x10^3/uL


(140-400) 


 





 


Neutrophils (%) (Auto)  66 % (31-73)   


 


Lymphocytes (%) (Auto)  25 % (24-48)   


 


Monocytes (%) (Auto)  8 % (0-9)   


 


Eosinophils (%) (Auto)  1 % (0-3)   


 


Basophils (%) (Auto)  0 % (0-3)   


 


Neutrophils # (Auto)


 


 3.8 x10^3/uL


(1.8-7.7) 


 





 


Lymphocytes # (Auto)


 


 1.5 x10^3/uL


(1.0-4.8) 


 





 


Monocytes # (Auto)


 


 0.5 x10^3/uL


(0.0-1.1) 


 





 


Eosinophils # (Auto)


 


 0.0 x10^3/uL


(0.0-0.7) 


 





 


Basophils # (Auto)


 


 0.0 x10^3/uL


(0.0-0.2) 


 





 


Sodium Level


 


 142 mmol/L


(136-145) 


 





 


Potassium Level


 


 3.0 mmol/L


(3.5-5.1) 


 





 


Chloride Level


 


 103 mmol/L


() 


 





 


Carbon Dioxide Level


 


 27 mmol/L


(21-32) 


 





 


Anion Gap  12 (6-14)   


 


Blood Urea Nitrogen


 


 11 mg/dL


(7-20) 


 





 


Creatinine


 


 1.1 mg/dL


(0.6-1.0) 


 





 


Estimated GFR


(Cockcroft-Gault) 


 49.1 


 


 





 


BUN/Creatinine Ratio  10 (6-20)   


 


Glucose Level


 


 107 mg/dL


(70-99) 


 





 


Lactic Acid Level


 


 1.4 mmol/L


(0.4-2.0) 


 





 


Calcium Level


 


 8.5 mg/dL


(8.5-10.1) 


 





 


Magnesium Level


 


 1.4 mg/dL


(1.8-2.4) 


 





 


Total Bilirubin


 


 0.5 mg/dL


(0.2-1.0) 


 





 


Aspartate Amino Transf


(AST/SGOT) 


 66 U/L (15-37) 


 


 





 


Alanine Aminotransferase


(ALT/SGPT) 


 33 U/L (14-59) 


 


 





 


Alkaline Phosphatase


 


 80 U/L


() 


 





 


Creatine Kinase


 


 64 U/L


() 


 





 


Creatine Kinase MB (Mass)


 


 1.0 ng/mL


(0.0-3.6) 


 





 


Creatine Kinase MB Relative


Index 


  % (0-4) 


 


 





 


Troponin I Quantitative


 


 < 0.017 ng/mL


(0.000-0.055) 


 





 


Total Protein


 


 7.1 g/dL


(6.4-8.2) 


 





 


Albumin


 


 2.9 g/dL


(3.4-5.0) 


 





 


Albumin/Globulin Ratio  0.7 (1.0-1.7)   


 


Prothrombin Time


 


 


 12.9 SEC


(11.7-14.0) 





 


Prothromb Time International


Ratio 


 


 1.0 (0.8-1.1) 


 





 


Activated Partial


Thromboplast Time 


 


 26 SEC (24-38) 


 





 


Urine Color    Yellow 


 


Urine Clarity    Turbid 


 


Urine pH    7.0 


 


Urine Specific Gravity    <=1.005 


 


Urine Protein


 


 


 


 Negative mg/dL


(NEG-TRACE)


 


Urine Glucose (UA)


 


 


 


 Negative mg/dL


(NEG)


 


Urine Ketones (Stick)


 


 


 


 Negative mg/dL


(NEG)


 


Urine Blood    Small (NEG) 


 


Urine Nitrite    Negative (NEG) 


 


Urine Bilirubin    Negative (NEG) 


 


Urine Urobilinogen Dipstick


 


 


 


 1.0 mg/dL (0.2


mg/dL)


 


Urine Leukocyte Esterase    Large (NEG) 


 


Urine RBC    Occ /HPF (0-2) 


 


Urine WBC


 


 


 


 Tntc /HPF


(0-4)


 


Urine Squamous Epithelial


Cells 


 


 


 None /LPF 





 


Urine Bacteria


 


 


 


 Moderate /HPF


(0-FEW)


 


Urine Mucus    Slight /LPF 


 


Test


 8/10/19


01:00 8/10/19


03:30 


 





 


Troponin I Quantitative


 < 0.017 ng/mL


(0.000-0.055) < 0.017 ng/mL


(0.000-0.055) 


 





 


Triglycerides Level


 


 125 mg/dL


(0-150) 


 





 


Cholesterol Level


 


 237 mg/dL


(0-200) 


 





 


LDL Cholesterol, Calculated


 


 179 mg/dL


(0-100) 


 





 


VLDL Cholesterol, Calculated


 


 25 mg/dL


(0-40) 


 





 


Non-HDL Cholesterol Calculated


 


 204 mg/dL


(0-129) 


 





 


HDL Cholesterol


 


 33 mg/dL


(40-60) 


 





 


Cholesterol/HDL Ratio  7.2   











Images


Images


PROCEDURE: CT HEAD WO CONTRAST





PQRS Compliance Statement:


 


One or more of the following individualized dose reduction techniques were


utilized for this examination:  


1. Automated exposure control  


2. Adjustment of the mA and/or kV according to patient size  


3. Use of iterative reconstruction technique


 


CT head without contrast 8/9/2019 7:36 PM


 


INDICATION: Speech changes and weakness in the right hand


 


COMPARISON: None available


 


TECHNIQUE: Multiple axial CT images of the head were obtained from skull 


base through the vertex without intravenous contrast. 


 


FINDINGS:


 


Head:


 


Ventricles, sulci and basal cisterns are within normal limits. Remote 


ischemic changes are identified with encephalomalacia involving the right 


middle and inferior frontal gyri. Low-attenuation in the periventricular 


white matter is suggestive of chronic small vessel ischemic changes. There


is no hydrocephalus. Gray-white matter differentiation is normal. There is


no acute intracranial hemorrhage. There is no mass, mass effect or midline


shift. Posterior fossa is normal in appearance.


 


Visualized portions of the orbits are normal. Paranasal sinuses are well 


aerated. Mastoid air cells are well aerated. Scalp and calvaria are 


normal.


 


 


IMPRESSION:


 


No acute intracranial hemorrhage.


 


Remote ischemic changes are identified in the right middle and inferior 


frontal gyri.


 


Electronically signed by: Delma Mendiola MD (8/9/2019 8:40 PM) 


[facility]








TDI


E/Lateral E'   8.6   E/Medial E'   7.3





Pulmonary Vein


S1 Velocity   28.2cm/s   D2 Velocity   30.5cm/s





 LEFT VENTRICLE 


The left ventricle is normal size. There is mild concentric left ventricular 

hypertrophy. Basal inferior wall hypokinesis. The Ejection Fraction is 55%. 

Transmitral Doppler flow pattern is Grade I-abnormal relaxation pattern.





 RIGHT VENTRICLE 


The right ventricle is normal size. The right ventricular systolic function is 

normal.





 ATRIA 


The left atrium size is normal. The right atrium size is normal. The interatrial

 septum is intact with no evidence for an atrial septal defect or patent foramen

 ovale as noted on 2-D or Doppler imaging.





 AORTIC VALVE 


The aortic valve is calcified but opens well. Doppler and Color Flow revealed 

moderate aortic regurgitation. There is no significant aortic valvular stenosis.





 MITRAL VALVE 


The mitral valve is normal in structure and function. There is no evidence of 

mitral valve prolapse. There is no mitral valve stenosis. Doppler and Color-flow

 revealed trace mitral regurgitation.





 TRICUSPID VALVE 


The tricuspid valve is normal in structure and function. Doppler and Color Flow 

revealed trace tricuspid valve regurgitation. There is no tricuspid valve 

stenosis.





 PULMONIC VALVE 


The pulmonary valve is normal in structure and function. Doppler and Color Flow 

revealed trace pulmonic valvular regurgitation. There is no pulmonic valvular 

stenosis.





 GREAT VESSELS 


The aortic root is normal in size. The ascending aorta is not well seen. The IVC

 is normal in size and collapses >50% with inspiration.





 PERICARDIAL EFFUSION 


There is no evidence of significant pericardial effusion.





Critical Notification


Critical Value: No





<Conclusion>


Basal inferior wall hypokinesis.


The Ejection Fraction is 55%.


Transmitral Doppler flow pattern is Grade I-abnormal relaxation pattern.


Moderate aortic regurgitation.


Trace mitral regurgitation.


There is no evidence of significant pericardial effusion.














DICTATED and SIGNED BY:     SABINA LIZAMA MD


DATE:     11/07/17 6629





CC: SABINA LIZAMA MD; EN BRITTON MD ~





VTE Prophylaxis Ordered


VTE Prophylaxis Devices:  No


VTE Pharmacological Prophylaxi:  Yes





Assessment/Plan


Assessment/Plan











Impression:  





TIA (transient ischemic attack)


Hypokalemia


Hypomagnesemia


UTI (urinary tract infection)


Remote ischemic changes are identified in the right middle and inferior frontal 

gyri.


Moderate aortic regurgitation.


Trace mitral regurgitation.


RECTAL BLEEDING


Abnormal, unintentional weight loss 20# in 6 mo


DISCOID LUPUS








PLAN





ADMIT


NEUROCHECKS Q 4 HRS


Neurology consult


TELE


CONSIDER MRI BRAIN


pt/ot/st


ASA


DOPPLER CAROTIDS


ECHO


DVT PROPHYLAXIS


GI CONSULT


ZAHRA

















76 min pt exam, chart review, > 50% of time spent with exam, chart review, pt 

care coordination











PETER ATKINSON MD          Aug 10, 2019 10:44

## 2019-08-11 VITALS — SYSTOLIC BLOOD PRESSURE: 137 MMHG | DIASTOLIC BLOOD PRESSURE: 77 MMHG

## 2019-08-11 VITALS — SYSTOLIC BLOOD PRESSURE: 124 MMHG | DIASTOLIC BLOOD PRESSURE: 59 MMHG

## 2019-08-11 VITALS — DIASTOLIC BLOOD PRESSURE: 63 MMHG | SYSTOLIC BLOOD PRESSURE: 154 MMHG

## 2019-08-11 VITALS — SYSTOLIC BLOOD PRESSURE: 147 MMHG | DIASTOLIC BLOOD PRESSURE: 75 MMHG

## 2019-08-11 VITALS — SYSTOLIC BLOOD PRESSURE: 194 MMHG | DIASTOLIC BLOOD PRESSURE: 81 MMHG

## 2019-08-11 VITALS — SYSTOLIC BLOOD PRESSURE: 146 MMHG | DIASTOLIC BLOOD PRESSURE: 71 MMHG

## 2019-08-11 VITALS — SYSTOLIC BLOOD PRESSURE: 155 MMHG | DIASTOLIC BLOOD PRESSURE: 65 MMHG

## 2019-08-11 VITALS — DIASTOLIC BLOOD PRESSURE: 74 MMHG | SYSTOLIC BLOOD PRESSURE: 182 MMHG

## 2019-08-11 LAB — AFPT MARKER: 317.2 NG/ML (ref 0–8.3)

## 2019-08-11 RX ADMIN — CITALOPRAM HYDROBROMIDE SCH MG: 20 TABLET ORAL at 08:48

## 2019-08-11 RX ADMIN — PANTOPRAZOLE SODIUM SCH MG: 40 TABLET, DELAYED RELEASE ORAL at 08:48

## 2019-08-11 RX ADMIN — CARVEDILOL SCH MG: 3.12 TABLET, FILM COATED ORAL at 16:45

## 2019-08-11 RX ADMIN — MAGNESIUM HYDROXIDE PRN MG: 400 SUSPENSION ORAL at 21:42

## 2019-08-11 RX ADMIN — IPRATROPIUM BROMIDE AND ALBUTEROL SULFATE SCH ML: .5; 3 SOLUTION RESPIRATORY (INHALATION) at 23:51

## 2019-08-11 RX ADMIN — ASPIRIN SCH MG: 325 TABLET, DELAYED RELEASE ORAL at 08:48

## 2019-08-11 RX ADMIN — Medication SCH CAP: at 20:13

## 2019-08-11 RX ADMIN — CARVEDILOL SCH MG: 3.12 TABLET, FILM COATED ORAL at 08:49

## 2019-08-11 RX ADMIN — PANTOPRAZOLE SODIUM SCH MG: 40 TABLET, DELAYED RELEASE ORAL at 07:30

## 2019-08-11 RX ADMIN — IPRATROPIUM BROMIDE AND ALBUTEROL SULFATE SCH ML: .5; 3 SOLUTION RESPIRATORY (INHALATION) at 15:38

## 2019-08-11 RX ADMIN — CEFTRIAXONE SCH GM: 1 INJECTION, POWDER, FOR SOLUTION INTRAMUSCULAR; INTRAVENOUS at 21:43

## 2019-08-11 RX ADMIN — IPRATROPIUM BROMIDE AND ALBUTEROL SULFATE SCH ML: .5; 3 SOLUTION RESPIRATORY (INHALATION) at 08:39

## 2019-08-11 RX ADMIN — ATORVASTATIN CALCIUM SCH MG: 40 TABLET, FILM COATED ORAL at 20:13

## 2019-08-11 RX ADMIN — IPRATROPIUM BROMIDE AND ALBUTEROL SULFATE SCH ML: .5; 3 SOLUTION RESPIRATORY (INHALATION) at 12:14

## 2019-08-11 RX ADMIN — IPRATROPIUM BROMIDE AND ALBUTEROL SULFATE SCH ML: .5; 3 SOLUTION RESPIRATORY (INHALATION) at 03:24

## 2019-08-11 RX ADMIN — IPRATROPIUM BROMIDE AND ALBUTEROL SULFATE SCH ML: .5; 3 SOLUTION RESPIRATORY (INHALATION) at 08:30

## 2019-08-11 RX ADMIN — IPRATROPIUM BROMIDE AND ALBUTEROL SULFATE SCH ML: .5; 3 SOLUTION RESPIRATORY (INHALATION) at 22:15

## 2019-08-11 NOTE — RAD
BILATERAL DUPLEX CAROTID SONOGRAPHY 

 

History: CVA, dizziness, lightheadedness.

 

Technique: Duplex sonography of the cervical portion of both carotid 

arteries was performed. Real-time grayscale, color flow Doppler, and 

Doppler spectral waveform analysis is performed.

 

Findings:

 

Right side:

 

Peak systolic flow velocity of the CCA is 50 cm/sec.

Peak systolic flow velocity of the ICA is 56 cm/sec.

The ICA/CCA ratio is 1.1.

Peak end diastolic flow velocity of the ICA is 16 cm/sec.

The peak systolic velocity of the ECA is 65 cm/sec.

 

Moderate echogenic plaque in the ICA.

 

Left side:

 

Peak systolic flow velocity of the CCA is 42 cm/sec.

Peak systolic flow velocity of the ICA is 69 cm/sec.

The ICA/CCA ratio is 1.6.

Peak end diastolic flow velocity of the ICA is 20 cm/sec. 

Peak systolic flow velocity of the ECA is 98 cm/sec.

 

Moderate echogenic plaque in the ICA.

 

Vertebral arteries:

 

Bilateral vertebral arteries demonstrate antegrade flow.

 

IMPRESSION:

No hemodynamically significant internal carotid artery stenosis is 

identified. 

 

 

PQRS Compliance Statement - Stenosis calculations for CT, MR and 

conventional angiography are based upon measurement of the distal ICA 

diameter in accordance with the NASCET methodology.  Stenosis calculations

for carotid ultrasound studies are derived from validated velocity 

criteria which are known to correlate with the NASCET methodology.

 

Electronically signed by: Nick Chaves MD (8/11/2019 12:23 PM) [facility]

## 2019-08-11 NOTE — PDOC
PROGRESS NOTES


Assessment


Problems


Medical Problems:


(1) UTI (urinary tract infection)


Status: Acute  








Plan





This patient presented to emergency room not feeling well for over 1 day.  

Patient was having some problems off slurring of speech.  Patient was at [facility] he had some worsening of symptoms.  Patient had improvement of symptoms on 

presentation he denied any complaint of numbness on the face or difficulty 

speaking.  Patient denied any focal extremity weakness.





This patient presented with symptoms of T IA patient is getting further stroke 

evaluation. Does not meet criteria for IV t-PA Patient has past medical history 

of multiple medical problems.  Patient has previous history of ischemic stroke. 

Patient was started on Aspirin non compliance.  Will get carotid Doppler, 2-D 

echo.  Hypomagnesemia on replacement protocol.  PT OT.  statin Continue medical 

management.  Plan discussed with patient, patient's family at bedside in detail 

plan discussed in detail


Subjective


She is feeling better denies any complaint of headache nausea or vomiting.


Objective





Vital Signs








  Date Time  Temp Pulse Resp B/P (MAP) Pulse Ox O2 Delivery O2 Flow Rate FiO2


 


8/11/19 20:00      Room Air  


 


8/11/19 19:00 97.4 78 16 124/59 (80) 99   





 97.4       














Intake and Output 


 


 8/11/19





 07:00


 


Intake Total 710 ml


 


Balance 710 ml


 


 


 


Intake Oral 710 ml








PHYSICAL EXAM


General no acute distress.


HEENT: Normocephalic and atraumatic. 


NECK: Supple without bruit


Respiratory: Clear to auscultation bilaterally


Heart: Regular rate and rhythm, S1S2 normal


NEUROLOGIC: Mental status Alert oriented.


Cranial nerve equally reactive pupils, and intact extraocular movements. 


No facial asymmetry. 


Palate elevates and tongue protrudes in midline. 


Reflexes are 1-2 with flexor plantar responses. 


Coordination Not able.


Strength Diffuse weakness not participating


Sensory exam is intact for light touch and pinprick.


Gait in bed.


Review of Relevant


I have reviewed the following items ignacio (where applicable) has been applied.


Labs





Laboratory Tests








Test


 8/10/19


01:00 8/10/19


03:30 8/11/19


13:26


 


Troponin I Quantitative


 < 0.017 ng/mL


(0.000-0.055) < 0.017 ng/mL


(0.000-0.055) 





 


White Blood Count


 


 3.7 x10^3/uL


(4.0-11.0) 





 


Red Blood Count


 


 4.43 x10^6/uL


(3.50-5.40) 





 


Hemoglobin


 


 12.3 g/dL


(12.0-15.5) 





 


Hematocrit


 


 37.3 %


(36.0-47.0) 





 


Mean Corpuscular Volume  84 fL ()  


 


Mean Corpuscular Hemoglobin  28 pg (25-35)  


 


Mean Corpuscular Hemoglobin


Concent 


 33 g/dL


(31-37) 





 


Red Cell Distribution Width


 


 16.3 %


(11.5-14.5) 





 


Platelet Count


 


 234 x10^3/uL


(140-400) 





 


Neutrophils (%) (Auto)  58 % (31-73)  


 


Lymphocytes (%) (Auto)  31 % (24-48)  


 


Monocytes (%) (Auto)  9 % (0-9)  


 


Eosinophils (%) (Auto)  1 % (0-3)  


 


Basophils (%) (Auto)  1 % (0-3)  


 


Neutrophils # (Auto)


 


 2.2 x10^3/uL


(1.8-7.7) 





 


Lymphocytes # (Auto)


 


 1.2 x10^3/uL


(1.0-4.8) 





 


Monocytes # (Auto)


 


 0.3 x10^3/uL


(0.0-1.1) 





 


Eosinophils # (Auto)


 


 0.0 x10^3/uL


(0.0-0.7) 





 


Basophils # (Auto)


 


 0.0 x10^3/uL


(0.0-0.2) 





 


Segmented Neutrophils %  55 % (35-66)  


 


Band Neutrophils %  1 % (0-9)  


 


Lymphocytes %  33 % (24-48)  


 


Monocytes %  11 % (0-10)  


 


Nucleated Red Blood Cells  1  


 


Platelet Estimate


 


 Adequate


(ADEQUATE) 





 


Large Platelets  Occ  


 


Sodium Level


 


 145 mmol/L


(136-145) 





 


Potassium Level


 


 3.2 mmol/L


(3.5-5.1) 





 


Chloride Level


 


 108 mmol/L


() 





 


Carbon Dioxide Level


 


 25 mmol/L


(21-32) 





 


Anion Gap  12 (6-14)  


 


Blood Urea Nitrogen


 


 10 mg/dL


(7-20) 





 


Creatinine


 


 0.8 mg/dL


(0.6-1.0) 





 


Estimated GFR


(Cockcroft-Gault) 


 70.9 


 





 


BUN/Creatinine Ratio  13 (6-20)  


 


Glucose Level


 


 90 mg/dL


(70-99) 





 


Calcium Level


 


 8.3 mg/dL


(8.5-10.1) 





 


Total Bilirubin


 


 0.3 mg/dL


(0.2-1.0) 





 


Aspartate Amino Transf


(AST/SGOT) 


 58 U/L (15-37) 


 





 


Alanine Aminotransferase


(ALT/SGPT) 


 29 U/L (14-59) 


 





 


Alkaline Phosphatase


 


 68 U/L


() 





 


Total Protein


 


 6.6 g/dL


(6.4-8.2) 





 


Albumin


 


 2.7 g/dL


(3.4-5.0) 





 


Albumin/Globulin Ratio  0.7 (1.0-1.7)  


 


Triglycerides Level


 


 125 mg/dL


(0-150) 





 


Cholesterol Level


 


 237 mg/dL


(0-200) 





 


LDL Cholesterol, Calculated


 


 179 mg/dL


(0-100) 





 


VLDL Cholesterol, Calculated


 


 25 mg/dL


(0-40) 





 


Non-HDL Cholesterol Calculated


 


 204 mg/dL


(0-129) 





 


HDL Cholesterol


 


 33 mg/dL


(40-60) 





 


Cholesterol/HDL Ratio  7.2  


 


Tumor Marker Alpha Fetoprotein


 


 


 317.2 ng/mL


(0.0-8.3)








Laboratory Tests








Test


 8/11/19


13:26


 


Tumor Marker Alpha Fetoprotein


 317.2 ng/mL


(0.0-8.3)








Medications





Current Medications


Sodium Chloride 1,000 ml @  1,000 mls/hr 1X  ONCE IV  Last administered on 

8/9/19at 20:33;  Start 8/9/19 at 20:00;  Stop 8/9/19 at 20:59;  Status DC


Aspirin (Children'S Aspirin) 162 mg 1X  ONCE PO  Last administered on 8/9/19at 

22:05;  Start 8/9/19 at 21:45;  Stop 8/9/19 at 21:46;  Status DC


Potassium Chloride (Klor-Con) 40 meq 1X  ONCE PO  Last administered on 8/9/19at 

22:05;  Start 8/9/19 at 21:45;  Stop 8/9/19 at 21:46;  Status DC


Magnesium Sulfate 50 ml @ 25 mls/hr 1X  ONCE IV  Last administered on 8/9/19at 

22:05;  Start 8/9/19 at 21:30;  Stop 8/9/19 at 23:29;  Status DC


Ceftriaxone Sodium (Rocephin) 1 gm 1X  ONCE IVP  Last administered on 8/9/19at 

22:05;  Start 8/9/19 at 22:00;  Stop 8/9/19 at 22:01;  Status DC


Ondansetron HCl (Zofran) 4 mg PRN Q8HRS  PRN IV NAUSEA/VOMITING;  Start 8/9/19 

at 22:00;  Stop 8/10/19 at 21:59;  Status DC


Alprazolam (Xanax) 0.25 mg BID PO ;  Start 8/10/19 at 00:00;  Stop 8/10/19 at 

00:05;  Status DC


Carvedilol (Coreg) 3.125 mg BIDWMEALS PO  Last administered on 8/11/19at 16:45; 

Start 8/10/19 at 00:00


Alprazolam (Xanax) 0.25 mg PRN BID  PRN PO ANXIETY / AGITATION Last administered

on 8/10/19at 00:37;  Start 8/10/19 at 00:15


Alprazolam (Xanax) 0.25 mg BID PO  Last administered on 8/11/19at 20:16;  Start 

8/10/19 at 00:05


Aspirin (Ecotrin) 81 mg DAILY PO  Last administered on 8/10/19at 12:06;  Start 

8/10/19 at 11:30;  Stop 8/10/19 at 14:14;  Status DC


Atorvastatin Calcium (Lipitor) 40 mg HS PO ;  Start 8/10/19 at 21:00;  Stop 

8/10/19 at 15:28;  Status DC


Citalopram Hydrobromide (CeleXA) 20 mg DAILY PO  Last administered on 8/11/19at 

08:49;  Start 8/10/19 at 12:00


Pantoprazole Sodium (Protonix) 40 mg DAILYAC PO  Last administered on 8/10/19at 

12:06;  Start 8/10/19 at 11:30;  Stop 8/11/19 at 12:36;  Status DC


Aspirin (Ecotrin) 325 mg DAILYWBKFT PO  Last administered on 8/11/19at 08:49;  

Start 8/10/19 at 15:00


Atorvastatin Calcium (Lipitor) 40 mg QHS PO  Last administered on 8/11/19at 

20:16;  Start 8/10/19 at 21:00


Sodium Chloride (Normal Saline Flush) 3 ml QSHIFT  PRN IV AFTER MEDS AND BLOOD 

DRAWS;  Start 8/10/19 at 14:15


Ondansetron HCl (Zofran) 4 mg PRN Q4HRS  PRN IV NAUSEA/VOMITING;  Start 8/10/19 

at 14:15


Acetaminophen (Tylenol) 650 mg PRN Q4HRS  PRN PO TEMP OVER 100.4F OR MILD PAIN; 

Start 8/10/19 at 14:15


Al Hydroxide/Mg Hydroxide (Mylanta Plus Xs) 30 ml PRN DAILY  PRN PO HEARTBURN / 

GAS;  Start 8/10/19 at 14:15


Clonidine HCl (Catapres) 0.1 mg PRN Q6HRS  PRN PO SBP>160 OR DBP>90 Last 

administered on 8/11/19at 00:45;  Start 8/10/19 at 14:15


Docusate Sodium (Colace) 100 mg PRN BID  PRN PO HARD STOOLS;  Start 8/10/19 at 

14:15


Albuterol/ Ipratropium (Duoneb) 3 ml Q4H NEB  Last administered on 8/11/19at 

15:38;  Start 8/10/19 at 14:15


Guaifenesin (Robitussin) 200 mg PRN Q4HRS  PRN PO COUGH;  Start 8/10/19 at 14:15


Ceftriaxone Sodium (Rocephin) 1 gm Q24H IVP  Last administered on 8/11/19at 

21:50;  Start 8/10/19 at 22:00


Potassium Chloride (Klor-Con) 40 meq 1X  ONCE PO  Last administered on 8/10/19at

16:27;  Start 8/10/19 at 17:00;  Stop 8/10/19 at 17:01;  Status DC


Iohexol (Omnipaque 240 Mg/ml) 50 ml 1X  ONCE PO ;  Start 8/10/19 at 16:00;  Stop

8/10/19 at 16:02;  Status DC


Iohexol (Omnipaque 300 Mg/ml) 100 ml 1X  ONCE IV ;  Start 8/10/19 at 16:00;  

Stop 8/10/19 at 16:02;  Status DC


Hydralazine HCl (Apresoline Inj) 10 mg 1X  ONCE IVP  Last administered on 

8/11/19at 05:44;  Start 8/11/19 at 05:30;  Stop 8/11/19 at 05:31;  Status DC


Non-Formulary Medication 1 ea DAILY07 PO  Last administered on 8/11/19at 16:45; 

Start 8/11/19 at 16:30


Lactobacillus Rhamnosus (Culturelle) 1 cap BID PO  Last administered on 

8/11/19at 20:16;  Start 8/11/19 at 21:00


Potassium Chloride (Klor-Con) 40 meq 1X  ONCE PO  Last administered on 8/11/19at

16:45;  Start 8/11/19 at 15:00;  Stop 8/11/19 at 15:01;  Status DC


Potassium Chloride (Klor-Con) 20 meq DAILYWBKFT PO ;  Start 8/12/19 at 08:00


Bisacodyl (Dulcolax Supp) 10 mg PRN DAILY  PRN UT CONSTIPATION;  Start 8/11/19 

at 20:30


Magnesium Hydroxide (Milk Of Magnesia) 2,400 mg PRN DAILY  PRN PO CONSTIPATION 

Last administered on 8/11/19at 21:50;  Start 8/11/19 at 20:30





Active Scripts


Active


Reported


Citalopram Hbr (Citalopram Hydrobromide) 20 Mg Tablet 1 Tab PO DAILY


[atrovent hfa]   17 Mcg  QID


Xanax (Alprazolam) 0.5 Mg Tablet 0.5-1 Tab PO BID


Coreg  ** (Carvedilol) 3.125 Mg Tablet 1 Tab PO BID


Nexium Capsule (Esomeprazole Magnesium) 40 Mg Capsule.dr 40 Mg PO DAILYAC


Atorvastatin Calcium 40 Mg Tablet 40 Mg PO HS


Aspir 81 (Aspirin) 81 Mg Tablet.dr 81 Mg PO DAILY


Vitals/I & O





Vital Sign - Last 24 Hours








 8/10/19 8/10/19 8/11/19 8/11/19





 23:36 23:50 00:45 01:28


 


Temp  98.7  





  98.7  


 


Pulse  71 71 66


 


Resp  18  


 


B/P (MAP)  178/75 (109) 178/73 147/75 (99)


 


Pulse Ox 99 98  


 


O2 Delivery Room Air Room Air  


 


    





    





 8/11/19 8/11/19 8/11/19 8/11/19





 03:25 03:50 05:44 06:19


 


Temp  98.2  





  98.2  


 


Pulse  67 67 75


 


Resp  18  16


 


B/P (MAP)  194/81 (118) 194/81 137/77 (97)


 


Pulse Ox 97 97  


 


O2 Delivery Room Air Room Air  Room Air


 


    





    





 8/11/19 8/11/19 8/11/19 8/11/19





 07:18 08:00 08:39 08:49


 


Temp 98.0   





 98.0   


 


Pulse 73   73


 


Resp 16   


 


B/P (MAP) 146/71 (96)   146/71


 


Pulse Ox 97  99 


 


O2 Delivery Room Air Room Air Room Air 


 


    





    





 8/11/19 8/11/19 8/11/19 8/11/19





 10:51 12:14 15:05 15:38


 


Temp 98.2  97.4 





 98.2  97.4 


 


Pulse 73  69 


 


Resp 18  18 


 


B/P (MAP) 155/65 (95)  154/63 (93) 


 


Pulse Ox 97  98 


 


O2 Delivery Room Air Room Air Room Air Room Air


 


    





    





 8/11/19 8/11/19 8/11/19 





 16:45 19:00 20:00 


 


Temp  97.4  





  97.4  


 


Pulse 69 78  


 


Resp  16  


 


B/P (MAP) 154/63 124/59 (80)  


 


Pulse Ox  99  


 


O2 Delivery  Room Air Room Air 














Intake and Output   


 


 8/10/19 8/10/19 8/11/19





 15:00 23:00 07:00


 


Intake Total 240 ml 220 ml 250 ml


 


Balance 240 ml 220 ml 250 ml

















JOSE GUADALUPE CARRASQUILLO MD                   Aug 11, 2019 23:07

## 2019-08-11 NOTE — PDOC
Provider Note


Provider Note


Full note dictated. 


Current CV status is stable. 


Ok to proceed with any surgical w/u for abd mass


On an outpt basis will consider event recorder to rule out occult arrhythmia as 

source of her stroke as she has no significant carotid occlusive disease. 


await TTE tmrw. 


Thanks











EMIGDIO BERRIOS MD       Aug 11, 2019 16:46

## 2019-08-11 NOTE — RAD
PQRS Compliance Statement:

 

One or more of the following individualized dose reduction techniques were

utilized for this examination:  

1. Automated exposure control  

2. Adjustment of the mA and/or kV according to patient size  

3. Use of iterative reconstruction technique

 

 

CT ABD PELV W/ORAL IV CONTRAST

 

Clinical Indication: Weight loss, 25 pounds x4 months.

 

Comparison: None.

 

Technique: Helical CT imaging of the abdomen and pelvis is performed after

75 cc of Omnipaque 300 IV contrast. Oral contrast also given.

 

Findings: 

Minimal atelectasis or scarring in the posterior lower lobes bilaterally. 

Mitral annular calcification. Coronary artery disease. Cardiac size 

normal.

 

There is a lobular heterogeneous mass of segments 2 and 3 of the liver. 

The mass measures 7.4 cm AP by 8.7 cm transverse by 9 cm craniocaudal. The

mass displaces and narrows adjacent vessels. The mass extends across 

midline to the left. The liver does not appear cirrhotic.

 

The gallbladder, spleen, pancreas, and adrenal glands are normal. Moderate

atherosclerotic calcification of the abdominal aorta and its branches. The

infrarenal abdominal aorta is ectatic. The kidneys enhance symmetrically, 

no hydronephrosis. There are small bilateral renal cysts.

 

Identical to the liver mass and immediately lateral to the liver mass 

there is a mass just lateral to the proximal stomach or arising from the 

stomach that measures 5.8 cm AP by 5.3 m transverse by 5.27 m 

craniocaudal.

 

There is no dilated small bowel. The appendix is normal. Scattered stool 

in the colon. No colon wall thickening is identified. Subcentimeter 

retroperitoneal lymph nodes. No adenopathy. No abdominal free fluid.

 

The urinary bladder is distended, otherwise normal. Uterus unremarkable. 

No pelvic free fluid.

 

Bones unremarkable.

 

IMPRESSION:

There is a heterogeneous mass lateral to the proximal stomach. The mass 

may be exophytic from the stomach. There is an identical appearing large 

lobular mass in the left hepatic lobe. The 2 masses nearly abut. Primary 

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 Other 

etiologies are not excluded.

 

Electronically signed by: Nick Chaves MD (8/11/2019 11:25 AM) [facility]

## 2019-08-11 NOTE — RAD
MRI BRAIN WITHOUT CONTRAST

 

History: TIA, speech changes.

 

Comparison: CT head without contrast, 2 days ago.

 

Technique: Multiplanar multiple pulse sequence images of the brain were 

obtained without contrast. 

 

Findings:

There is a 5 mm focus of restricted diffusion in the left posterior 

frontal lobe. No other restricted diffusion is identified. There are mild 

T2 and FLAIR hyperintensities in the supratentorial white matter. There is

old right frontal lobe infarct. There is no midline shift or mass effect. 

No extra-axial fluid collection or intraparenchymal hemorrhage. Tiny 

blooming artifact in the right basal ganglia is likely due to chronic 

hemosiderin deposition. Mild generalized cerebral atrophy.

 

Visualized vascular flow voids are intact. There is no cerebellar 

tonsillar ectopia. Globes and orbits are normal.  The visualized paranasal

sinuses and mastoid air cells are clear.

 

IMPRESSION:

1.  5 mm acute infarct in the posterior left frontal lobe.

2.  Old right frontal lobe infarct.

3.  Supratentorial white matter changes probably due to chronic small 

vessel ischemic disease.

4.  Mild generalized cerebral atrophy.

 

 

Findings discussed with Gracie patient's nurse at 8/11/2019 2:58 PM.

 

**********FOR INTERNAL CODING PURPOSES**********

 

Critical result:

 

RESULT CODE: (C)

 

Electronically signed by: Nick Chaves MD (8/11/2019 3:00 PM) [facility]

## 2019-08-11 NOTE — PDOC
G I PROGRESS NOTE


Subjective


No major complaints.


Physical Exam


Lungs clear.


RRR


Abdomen soft, not tender nor distended.


Review of Relevant


I have reviewed the following items ignacio (where applicable) has been applied.


Labs





Laboratory Tests








Test


 8/9/19


19:26 8/9/19


19:38 8/9/19


19:39 8/9/19


21:20


 


Glucose (Fingerstick)


 103 mg/dL


(70-99) 


 


 





 


White Blood Count


 


 5.8 x10^3/uL


(4.0-11.0) 


 





 


Red Blood Count


 


 4.48 x10^6/uL


(3.50-5.40) 


 





 


Hemoglobin


 


 12.6 g/dL


(12.0-15.5) 


 





 


Hematocrit


 


 37.2 %


(36.0-47.0) 


 





 


Mean Corpuscular Volume  83 fL ()   


 


Mean Corpuscular Hemoglobin  28 pg (25-35)   


 


Mean Corpuscular Hemoglobin


Concent 


 34 g/dL


(31-37) 


 





 


Red Cell Distribution Width


 


 16.1 %


(11.5-14.5) 


 





 


Platelet Count


 


 269 x10^3/uL


(140-400) 


 





 


Neutrophils (%) (Auto)  66 % (31-73)   


 


Lymphocytes (%) (Auto)  25 % (24-48)   


 


Monocytes (%) (Auto)  8 % (0-9)   


 


Eosinophils (%) (Auto)  1 % (0-3)   


 


Basophils (%) (Auto)  0 % (0-3)   


 


Neutrophils # (Auto)


 


 3.8 x10^3/uL


(1.8-7.7) 


 





 


Lymphocytes # (Auto)


 


 1.5 x10^3/uL


(1.0-4.8) 


 





 


Monocytes # (Auto)


 


 0.5 x10^3/uL


(0.0-1.1) 


 





 


Eosinophils # (Auto)


 


 0.0 x10^3/uL


(0.0-0.7) 


 





 


Basophils # (Auto)


 


 0.0 x10^3/uL


(0.0-0.2) 


 





 


Sodium Level


 


 142 mmol/L


(136-145) 


 





 


Potassium Level


 


 3.0 mmol/L


(3.5-5.1) 


 





 


Chloride Level


 


 103 mmol/L


() 


 





 


Carbon Dioxide Level


 


 27 mmol/L


(21-32) 


 





 


Anion Gap  12 (6-14)   


 


Blood Urea Nitrogen


 


 11 mg/dL


(7-20) 


 





 


Creatinine


 


 1.1 mg/dL


(0.6-1.0) 


 





 


Estimated GFR


(Cockcroft-Gault) 


 49.1 


 


 





 


BUN/Creatinine Ratio  10 (6-20)   


 


Glucose Level


 


 107 mg/dL


(70-99) 


 





 


Lactic Acid Level


 


 1.4 mmol/L


(0.4-2.0) 


 





 


Calcium Level


 


 8.5 mg/dL


(8.5-10.1) 


 





 


Magnesium Level


 


 1.4 mg/dL


(1.8-2.4) 


 





 


Total Bilirubin


 


 0.5 mg/dL


(0.2-1.0) 


 





 


Aspartate Amino Transf


(AST/SGOT) 


 66 U/L (15-37) 


 


 





 


Alanine Aminotransferase


(ALT/SGPT) 


 33 U/L (14-59) 


 


 





 


Alkaline Phosphatase


 


 80 U/L


() 


 





 


Creatine Kinase


 


 64 U/L


() 


 





 


Creatine Kinase MB (Mass)


 


 1.0 ng/mL


(0.0-3.6) 


 





 


Creatine Kinase MB Relative


Index 


  % (0-4) 


 


 





 


Troponin I Quantitative


 


 < 0.017 ng/mL


(0.000-0.055) 


 





 


Total Protein


 


 7.1 g/dL


(6.4-8.2) 


 





 


Albumin


 


 2.9 g/dL


(3.4-5.0) 


 





 


Albumin/Globulin Ratio  0.7 (1.0-1.7)   


 


Prothrombin Time


 


 


 12.9 SEC


(11.7-14.0) 





 


Prothromb Time International


Ratio 


 


 1.0 (0.8-1.1) 


 





 


Activated Partial


Thromboplast Time 


 


 26 SEC (24-38) 


 





 


Urine Color    Yellow 


 


Urine Clarity    Turbid 


 


Urine pH    7.0 


 


Urine Specific Gravity    <=1.005 


 


Urine Protein


 


 


 


 Negative mg/dL


(NEG-TRACE)


 


Urine Glucose (UA)


 


 


 


 Negative mg/dL


(NEG)


 


Urine Ketones (Stick)


 


 


 


 Negative mg/dL


(NEG)


 


Urine Blood    Small (NEG) 


 


Urine Nitrite    Negative (NEG) 


 


Urine Bilirubin    Negative (NEG) 


 


Urine Urobilinogen Dipstick


 


 


 


 1.0 mg/dL (0.2


mg/dL)


 


Urine Leukocyte Esterase    Large (NEG) 


 


Urine RBC    Occ /HPF (0-2) 


 


Urine WBC


 


 


 


 Tntc /HPF


(0-4)


 


Urine Squamous Epithelial


Cells 


 


 


 None /LPF 





 


Urine Bacteria


 


 


 


 Moderate /HPF


(0-FEW)


 


Urine Mucus    Slight /LPF 


 


Test


 8/10/19


01:00 8/10/19


03:30 


 





 


Troponin I Quantitative


 < 0.017 ng/mL


(0.000-0.055) < 0.017 ng/mL


(0.000-0.055) 


 





 


White Blood Count


 


 3.7 x10^3/uL


(4.0-11.0) 


 





 


Red Blood Count


 


 4.43 x10^6/uL


(3.50-5.40) 


 





 


Hemoglobin


 


 12.3 g/dL


(12.0-15.5) 


 





 


Hematocrit


 


 37.3 %


(36.0-47.0) 


 





 


Mean Corpuscular Volume  84 fL ()   


 


Mean Corpuscular Hemoglobin  28 pg (25-35)   


 


Mean Corpuscular Hemoglobin


Concent 


 33 g/dL


(31-37) 


 





 


Red Cell Distribution Width


 


 16.3 %


(11.5-14.5) 


 





 


Platelet Count


 


 234 x10^3/uL


(140-400) 


 





 


Neutrophils (%) (Auto)  58 % (31-73)   


 


Lymphocytes (%) (Auto)  31 % (24-48)   


 


Monocytes (%) (Auto)  9 % (0-9)   


 


Eosinophils (%) (Auto)  1 % (0-3)   


 


Basophils (%) (Auto)  1 % (0-3)   


 


Neutrophils # (Auto)


 


 2.2 x10^3/uL


(1.8-7.7) 


 





 


Lymphocytes # (Auto)


 


 1.2 x10^3/uL


(1.0-4.8) 


 





 


Monocytes # (Auto)


 


 0.3 x10^3/uL


(0.0-1.1) 


 





 


Eosinophils # (Auto)


 


 0.0 x10^3/uL


(0.0-0.7) 


 





 


Basophils # (Auto)


 


 0.0 x10^3/uL


(0.0-0.2) 


 





 


Segmented Neutrophils %  55 % (35-66)   


 


Band Neutrophils %  1 % (0-9)   


 


Lymphocytes %  33 % (24-48)   


 


Monocytes %  11 % (0-10)   


 


Nucleated Red Blood Cells  1   


 


Platelet Estimate


 


 Adequate


(ADEQUATE) 


 





 


Large Platelets  Occ   


 


Sodium Level


 


 145 mmol/L


(136-145) 


 





 


Potassium Level


 


 3.2 mmol/L


(3.5-5.1) 


 





 


Chloride Level


 


 108 mmol/L


() 


 





 


Carbon Dioxide Level


 


 25 mmol/L


(21-32) 


 





 


Anion Gap  12 (6-14)   


 


Blood Urea Nitrogen


 


 10 mg/dL


(7-20) 


 





 


Creatinine


 


 0.8 mg/dL


(0.6-1.0) 


 





 


Estimated GFR


(Cockcroft-Gault) 


 70.9 


 


 





 


BUN/Creatinine Ratio  13 (6-20)   


 


Glucose Level


 


 90 mg/dL


(70-99) 


 





 


Calcium Level


 


 8.3 mg/dL


(8.5-10.1) 


 





 


Total Bilirubin


 


 0.3 mg/dL


(0.2-1.0) 


 





 


Aspartate Amino Transf


(AST/SGOT) 


 58 U/L (15-37) 


 


 





 


Alanine Aminotransferase


(ALT/SGPT) 


 29 U/L (14-59) 


 


 





 


Alkaline Phosphatase


 


 68 U/L


() 


 





 


Total Protein


 


 6.6 g/dL


(6.4-8.2) 


 





 


Albumin


 


 2.7 g/dL


(3.4-5.0) 


 





 


Albumin/Globulin Ratio  0.7 (1.0-1.7)   


 


Triglycerides Level


 


 125 mg/dL


(0-150) 


 





 


Cholesterol Level


 


 237 mg/dL


(0-200) 


 





 


LDL Cholesterol, Calculated


 


 179 mg/dL


(0-100) 


 





 


VLDL Cholesterol, Calculated


 


 25 mg/dL


(0-40) 


 





 


Non-HDL Cholesterol Calculated


 


 204 mg/dL


(0-129) 


 





 


HDL Cholesterol


 


 33 mg/dL


(40-60) 


 





 


Cholesterol/HDL Ratio  7.2   








Vitals/I & O





Vital Sign - Last 24 Hours








 8/10/19 8/10/19 8/10/19 8/10/19





 15:51 15:56 16:27 16:27


 


Temp  99.1  





  99.1  


 


Pulse  71 71 71


 


Resp  18  


 


B/P (MAP)  185/75 (111) 185/75 185/75


 


Pulse Ox 95 97  


 


O2 Delivery Room Air Room Air  


 


    





    





 8/10/19 8/10/19 8/10/19 8/10/19





 19:55 20:00 23:36 23:50


 


Temp 98.2   98.7





 98.2   98.7


 


Pulse 66   71


 


Resp 18   18


 


B/P (MAP) 176/78 (110)   178/75 (109)


 


Pulse Ox 97  99 98


 


O2 Delivery Room Air Room Air Room Air Room Air


 


    





    





 8/11/19 8/11/19 8/11/19 8/11/19





 00:45 01:28 03:25 03:50


 


Temp    98.2





    98.2


 


Pulse 71 66  67


 


Resp    18


 


B/P (MAP) 178/73 147/75 (99)  194/81 (118)


 


Pulse Ox   97 97


 


O2 Delivery   Room Air Room Air


 


    





    





 8/11/19 8/11/19 8/11/19 8/11/19





 05:44 06:19 07:18 08:00


 


Temp   98.0 





   98.0 


 


Pulse 67 75 73 


 


Resp  16 16 


 


B/P (MAP) 194/81 137/77 (97) 146/71 (96) 


 


Pulse Ox   97 


 


O2 Delivery  Room Air Room Air Room Air


 


    





    





 8/11/19 8/11/19 8/11/19 





 08:39 08:49 10:51 


 


Temp   98.2 





   98.2 


 


Pulse  73 73 


 


Resp   18 


 


B/P (MAP)  146/71 155/65 (95) 


 


Pulse Ox 99  97 


 


O2 Delivery Room Air  Room Air 














Intake and Output   


 


 8/10/19 8/10/19 8/11/19





 15:00 23:00 07:00


 


Intake Total 240 ml 220 ml 250 ml


 


Balance 240 ml 220 ml 250 ml








Images


On CT:





Findings: 


Minimal atelectasis or scarring in the posterior lower lobes bilaterally. 


Mitral annular calcification. Coronary artery disease. Cardiac size 


normal.


 


There is a lobular heterogeneous mass of segments 2 and 3 of the liver. 


The mass measures 7.4 cm AP by 8.7 cm transverse by 9 cm craniocaudal. The


mass displaces and narrows adjacent vessels. The mass extends across 


midline to the left. The liver does not appear cirrhotic.


 


The gallbladder, spleen, pancreas, and adrenal glands are normal. Moderate


atherosclerotic calcification of the abdominal aorta and its branches. The


infrarenal abdominal aorta is ectatic. The kidneys enhance symmetrically, 


no hydronephrosis. There are small bilateral renal cysts.


 


Identical to the liver mass and immediately lateral to the liver mass 


there is a mass just lateral to the proximal stomach or arising from the 


stomach that measures 5.8 cm AP by 5.3 m transverse by 5.27 m 


craniocaudal.


 


There is no dilated small bowel. The appendix is normal. Scattered stool 


in the colon. No colon wall thickening is identified. Subcentimeter 


retroperitoneal lymph nodes. No adenopathy. No abdominal free fluid.


 


The urinary bladder is distended, otherwise normal. Uterus unremarkable. 


No pelvic free fluid.


 


Bones unremarkable.


 


IMPRESSION:


There is a heterogeneous mass lateral to the proximal stomach. The mass 


may be exophytic from the stomach. There is an identical appearing large 


lobular mass in the left hepatic lobe. The 2 masses nearly abut. Primary 


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 Other 


etiologies are not excluded.


Problem List


Problems


Medical Problems:


(1) UTI (urinary tract infection)


Status: Acute  





Assessment


Apparent metastatic tumor to liver; could be primary.  Not mass in stomach in 

2016 at EGD.  CT resulted after I saw her.


Plan of Care:  Continue current Tx, Mgmt


Plan of Care Note


Will ask IR to consider biopsy.


If no INR, will check.


CEA, AFP











HARRIETT VALENCIA MD          Aug 11, 2019 12:04

## 2019-08-11 NOTE — CONS
DATE OF CONSULTATION:  08/11/2019



REASON FOR CONSULTATION:  Aortic insufficiency.



HISTORY OF PRESENT ILLNESS:  The patient is a 69-year-old woman who presented

with speech difficulty.  She has been diagnosed with an acute frontal lobe

infarct.  Ongoing workup for her stroke continues.



In speaking with the patient, she lives with her son and she is usually

compliant with her medications and reports that she has not had any recent

cardiac issues.  Cardiology was specifically consulted for her history of aortic

insufficiency and coronary artery disease.



She denies any chest pain, dyspnea, orthopnea or PND.  No palpitations or

arrhythmias in the past.



PAST MEDICAL HISTORY:

1.  Coronary artery disease, status post PCI in 2017.

2.  Mild-to-moderate aortic insufficiency.

3.  History of hypertension.

4.  Dyslipidemia.

5.  PAD status post right common iliac artery stenting.



SOCIAL HISTORY:  The patient used to be a smoker.  Denies any acute alcohol or

illicit drug use.



FAMILY HISTORY:  Noncontributory.



ALLERGIES:  CLOPIDOGREL.



CURRENT CARDIAC MEDICATIONS:

1.  Atorvastatin 40 mg daily.

2.  Aspirin 325 mg daily.

3.  Clonidine 0.1 mg as needed.

4.  Carvedilol 3.125 mg b.i.d.



PHYSICAL EXAMINATION:

VITAL SIGNS:  Afebrile, pulse rate 69, respirations 18, blood pressure 154/63,

oxygen saturation 98% on room air.

GENERAL:  When she was presented to the hospital, she was quite hypertensive

with systolic blood pressures in the 180s.

GENERAL:  She is alert and oriented, no acute distress.

HEAD AND NECK:  Unremarkable.

CARDIAC:  Regular rate and rhythm without any obvious murmurs, rubs or gallops.

LUNGS:  Clear to auscultation.

ABDOMEN:  Soft, nontender, nondistended.

EXTREMITIES:  No clubbing, cyanosis or edema.  She has multiple bruises on her

skin, likely due to a frail elderly state.

NEUROLOGIC:  No obvious focal deficits at this time.



DIAGNOSTIC STUDIES:  Brain MRI demonstrates a 5 mm acute infarct in the

posterior left frontal lobe.



Echocardiogram has been performed in 2017 revealed moderate aortic

insufficiency.



EKG currently demonstrates a sinus rhythm with nonspecific ST-T wave changes.



IMPRESSION:

1.  Acute cerebrovascular accident.

2.  Mild aortic insufficiency.

3.  Coronary artery disease, currently stable.

4.  PAD, currently stable.



RECOMMENDATIONS:

1.  Agree with a bubble study and repeat echocardiogram.  No other specific

cardiac issues at this time.

2.  Routine followup with us in our office.  Continue current risk factor

modification with statin therapy and antihypertensives.  May need additional

antihypertensives over the next 24 hours depending on her response to the

carvedilol.

3. Plan for outpt event recorder to rule out any occult arrhythmia. 



Thank you for this consultation.

 



______________________________

EMIGDIO BERRIOS MD



DR:  ALIDA/soraida  JOB#:  [id number] / [id number]

DD:  08/11/2019 16:33  DT:  08/11/2019 16:50

HEMA

## 2019-08-12 VITALS — DIASTOLIC BLOOD PRESSURE: 77 MMHG | SYSTOLIC BLOOD PRESSURE: 175 MMHG

## 2019-08-12 VITALS — SYSTOLIC BLOOD PRESSURE: 146 MMHG | DIASTOLIC BLOOD PRESSURE: 66 MMHG

## 2019-08-12 VITALS — SYSTOLIC BLOOD PRESSURE: 131 MMHG | DIASTOLIC BLOOD PRESSURE: 66 MMHG

## 2019-08-12 VITALS — DIASTOLIC BLOOD PRESSURE: 85 MMHG | SYSTOLIC BLOOD PRESSURE: 180 MMHG

## 2019-08-12 VITALS — DIASTOLIC BLOOD PRESSURE: 70 MMHG | SYSTOLIC BLOOD PRESSURE: 165 MMHG

## 2019-08-12 VITALS — SYSTOLIC BLOOD PRESSURE: 165 MMHG | DIASTOLIC BLOOD PRESSURE: 75 MMHG

## 2019-08-12 VITALS — SYSTOLIC BLOOD PRESSURE: 173 MMHG | DIASTOLIC BLOOD PRESSURE: 81 MMHG

## 2019-08-12 VITALS — DIASTOLIC BLOOD PRESSURE: 81 MMHG | SYSTOLIC BLOOD PRESSURE: 159 MMHG

## 2019-08-12 VITALS — DIASTOLIC BLOOD PRESSURE: 66 MMHG | SYSTOLIC BLOOD PRESSURE: 124 MMHG

## 2019-08-12 VITALS — SYSTOLIC BLOOD PRESSURE: 167 MMHG | DIASTOLIC BLOOD PRESSURE: 78 MMHG

## 2019-08-12 VITALS — DIASTOLIC BLOOD PRESSURE: 79 MMHG | SYSTOLIC BLOOD PRESSURE: 171 MMHG

## 2019-08-12 VITALS — SYSTOLIC BLOOD PRESSURE: 162 MMHG | DIASTOLIC BLOOD PRESSURE: 91 MMHG

## 2019-08-12 LAB
ALBUMIN SERPL-MCNC: 2.6 G/DL (ref 3.4–5)
ALBUMIN/GLOB SERPL: 0.7 {RATIO} (ref 1–1.7)
ALP SERPL-CCNC: 70 U/L (ref 46–116)
ALT SERPL-CCNC: 31 U/L (ref 14–59)
ANION GAP SERPL CALC-SCNC: 11 MMOL/L (ref 6–14)
AST SERPL-CCNC: 61 U/L (ref 15–37)
BASOPHILS # BLD AUTO: 0 X10^3/UL (ref 0–0.2)
BASOPHILS NFR BLD: 1 % (ref 0–3)
BILIRUB SERPL-MCNC: 0.4 MG/DL (ref 0.2–1)
BUN SERPL-MCNC: 8 MG/DL (ref 7–20)
BUN/CREAT SERPL: 11 (ref 6–20)
CALCIUM SERPL-MCNC: 8.5 MG/DL (ref 8.5–10.1)
CHLORIDE SERPL-SCNC: 108 MMOL/L (ref 98–107)
CO2 SERPL-SCNC: 26 MMOL/L (ref 21–32)
CREAT SERPL-MCNC: 0.7 MG/DL (ref 0.6–1)
EOSINOPHIL NFR BLD: 0 X10^3/UL (ref 0–0.7)
EOSINOPHIL NFR BLD: 1 % (ref 0–3)
ERYTHROCYTE [DISTWIDTH] IN BLOOD BY AUTOMATED COUNT: 15.9 % (ref 11.5–14.5)
GFR SERPLBLD BASED ON 1.73 SQ M-ARVRAT: 82.7 ML/MIN
GLOBULIN SER-MCNC: 3.9 G/DL (ref 2.2–3.8)
GLUCOSE SERPL-MCNC: 107 MG/DL (ref 70–99)
HCT VFR BLD CALC: 36.5 % (ref 36–47)
HGB BLD-MCNC: 12.3 G/DL (ref 12–15.5)
LYMPHOCYTES # BLD: 1.3 X10^3/UL (ref 1–4.8)
LYMPHOCYTES NFR BLD AUTO: 31 % (ref 24–48)
MCH RBC QN AUTO: 28 PG (ref 25–35)
MCHC RBC AUTO-ENTMCNC: 34 G/DL (ref 31–37)
MCV RBC AUTO: 83 FL (ref 79–100)
MONO #: 0.3 X10^3/UL (ref 0–1.1)
MONOCYTES NFR BLD: 8 % (ref 0–9)
NEUT #: 2.4 X10^3/UL (ref 1.8–7.7)
NEUTROPHILS NFR BLD AUTO: 59 % (ref 31–73)
PLATELET # BLD AUTO: 230 X10^3/UL (ref 140–400)
POTASSIUM SERPL-SCNC: 3.1 MMOL/L (ref 3.5–5.1)
PROT SERPL-MCNC: 6.5 G/DL (ref 6.4–8.2)
RBC # BLD AUTO: 4.4 X10^6/UL (ref 3.5–5.4)
SODIUM SERPL-SCNC: 145 MMOL/L (ref 136–145)
WBC # BLD AUTO: 4.1 X10^3/UL (ref 4–11)

## 2019-08-12 RX ADMIN — Medication SCH CAP: at 09:00

## 2019-08-12 RX ADMIN — IPRATROPIUM BROMIDE AND ALBUTEROL SULFATE SCH ML: .5; 3 SOLUTION RESPIRATORY (INHALATION) at 10:15

## 2019-08-12 RX ADMIN — ATORVASTATIN CALCIUM SCH MG: 40 TABLET, FILM COATED ORAL at 21:43

## 2019-08-12 RX ADMIN — CARVEDILOL SCH MG: 3.12 TABLET, FILM COATED ORAL at 09:11

## 2019-08-12 RX ADMIN — POTASSIUM CHLORIDE SCH MEQ: 1500 TABLET, EXTENDED RELEASE ORAL at 09:10

## 2019-08-12 RX ADMIN — CITALOPRAM HYDROBROMIDE SCH MG: 20 TABLET ORAL at 09:00

## 2019-08-12 RX ADMIN — Medication SCH CAP: at 21:43

## 2019-08-12 RX ADMIN — CARVEDILOL SCH MG: 3.12 TABLET, FILM COATED ORAL at 17:15

## 2019-08-12 RX ADMIN — IPRATROPIUM BROMIDE AND ALBUTEROL SULFATE SCH ML: .5; 3 SOLUTION RESPIRATORY (INHALATION) at 20:06

## 2019-08-12 RX ADMIN — CEFTRIAXONE SCH GM: 1 INJECTION, POWDER, FOR SOLUTION INTRAMUSCULAR; INTRAVENOUS at 21:43

## 2019-08-12 RX ADMIN — ASPIRIN SCH MG: 325 TABLET, DELAYED RELEASE ORAL at 08:00

## 2019-08-12 RX ADMIN — IPRATROPIUM BROMIDE AND ALBUTEROL SULFATE SCH ML: .5; 3 SOLUTION RESPIRATORY (INHALATION) at 14:15

## 2019-08-12 NOTE — PDOC
PROGRESS NOTES


Subjective


Subjective


s/p liver biopsy earlier, not feeling well





Objective


Objective





Vital Signs








  Date Time  Temp Pulse Resp B/P (MAP) Pulse Ox O2 Delivery O2 Flow Rate FiO2


 


8/12/19 11:12   14  95 Room Air  


 


8/12/19 11:00 98.4 76  146/66 (92)    





 98.4       














Intake and Output 


 


 8/12/19





 06:59


 


Intake Total 440 ml


 


Balance 440 ml


 


 


 


Intake Oral 440 ml


 


# Voids 3











Physical Exam


Abdomen:  Normal bowel sounds


Heart:  Regular rate, Other (GR 2/6 WIL )


Extremities:  No cyanosis


General:  Alert, Cooperative, No acute distress


HEENT:  Atraumatic


Lungs:  Clear to auscultation


Neuro:  Normal speech


Psych/Mental Status:  Mental status NL, Mood NL


Skin:  No breakdown





Assessment


Assessment


1.  Acute cerebrovascular accident:  Neurology team following. 2-D echo showed 

normal LV systolic function with mild to moderate aortic insufficiency. Plan 

outpatient event monitor to rule out arrhythmia as a cause.


2. Gastric mass with possible liver metastasis s/p liver biopsy earlier today. 

Gastroenterology team following. 


3.  Coronary artery disease, s/p PCI/stents to LAD and LCx, currently stable and

chest pain-free.


4.  PAD s/p PTA/stent to right common iliac artery, currently stable.


5.  Hypertension:  Controlled





Plan


Plan of Care


Problems


Medical Problems:


(1) UTI (urinary tract infection)


Status: Acute  











Comment


Review of Relevant


I have reviewed the following items ignacio (where applicable) has been applied.


Labs





Laboratory Tests








Test


 8/11/19


13:26 8/12/19


03:15


 


Tumor Marker Alpha Fetoprotein


 317.2 ng/mL


(0.0-8.3) 





 


White Blood Count


 


 4.1 x10^3/uL


(4.0-11.0)


 


Red Blood Count


 


 4.40 x10^6/uL


(3.50-5.40)


 


Hemoglobin


 


 12.3 g/dL


(12.0-15.5)


 


Hematocrit


 


 36.5 %


(36.0-47.0)


 


Mean Corpuscular Volume  83 fL () 


 


Mean Corpuscular Hemoglobin  28 pg (25-35) 


 


Mean Corpuscular Hemoglobin


Concent 


 34 g/dL


(31-37)


 


Red Cell Distribution Width


 


 15.9 %


(11.5-14.5)


 


Platelet Count


 


 230 x10^3/uL


(140-400)


 


Neutrophils (%) (Auto)  59 % (31-73) 


 


Lymphocytes (%) (Auto)  31 % (24-48) 


 


Monocytes (%) (Auto)  8 % (0-9) 


 


Eosinophils (%) (Auto)  1 % (0-3) 


 


Basophils (%) (Auto)  1 % (0-3) 


 


Neutrophils # (Auto)


 


 2.4 x10^3/uL


(1.8-7.7)


 


Lymphocytes # (Auto)


 


 1.3 x10^3/uL


(1.0-4.8)


 


Monocytes # (Auto)


 


 0.3 x10^3/uL


(0.0-1.1)


 


Eosinophils # (Auto)


 


 0.0 x10^3/uL


(0.0-0.7)


 


Basophils # (Auto)


 


 0.0 x10^3/uL


(0.0-0.2)


 


Sodium Level


 


 145 mmol/L


(136-145)


 


Potassium Level


 


 3.1 mmol/L


(3.5-5.1)


 


Chloride Level


 


 108 mmol/L


()


 


Carbon Dioxide Level


 


 26 mmol/L


(21-32)


 


Anion Gap  11 (6-14) 


 


Blood Urea Nitrogen  8 mg/dL (7-20) 


 


Creatinine


 


 0.7 mg/dL


(0.6-1.0)


 


Estimated GFR


(Cockcroft-Gault) 


 82.7 





 


BUN/Creatinine Ratio  11 (6-20) 


 


Glucose Level


 


 107 mg/dL


(70-99)


 


Calcium Level


 


 8.5 mg/dL


(8.5-10.1)


 


Total Bilirubin


 


 0.4 mg/dL


(0.2-1.0)


 


Aspartate Amino Transf


(AST/SGOT) 


 61 U/L (15-37) 





 


Alanine Aminotransferase


(ALT/SGPT) 


 31 U/L (14-59) 





 


Alkaline Phosphatase


 


 70 U/L


()


 


Total Protein


 


 6.5 g/dL


(6.4-8.2)


 


Albumin


 


 2.6 g/dL


(3.4-5.0)


 


Albumin/Globulin Ratio  0.7 (1.0-1.7) 








Medications





Current Medications


Amlodipine Besylate (Norvasc) 5 mg DAILY PO  Last administered on 8/12/19at 

09:12;  Start 8/12/19 at 03:45


Bisacodyl (Dulcolax Supp) 10 mg PRN DAILY  PRN DE CONSTIPATION;  Start 8/11/19 

at 20:30


Fentanyl Citrate (Fentanyl 2ml Vial) 100 mcg 1X  ONCE IV  Last administered on 

8/12/19at 11:12;  Start 8/12/19 at 10:15;  Stop 8/12/19 at 10:16;  Status DC


Fentanyl Citrate (Fentanyl 2ml Vial) 100 mcg STK-MED ONCE .ROUTE ;  Start 

8/12/19 at 09:37;  Stop 8/12/19 at 09:37;  Status DC


Hydralazine HCl (Apresoline Inj) 10 mg PRN Q4HRS  PRN IVP ELEVATED BP, SEE 

COMMENTS Last administered on 8/12/19at 03:44;  Start 8/12/19 at 03:45


Lactobacillus Rhamnosus (Culturelle) 1 cap BID PO  Last administered on 

8/11/19at 20:16;  Start 8/11/19 at 21:00


Lidocaine/Sodium Bicarbonate (Buffered Lidocaine 1%) 3 ml STK-MED ONCE .ROUTE ; 

Start 8/12/19 at 09:06;  Stop 8/12/19 at 09:06;  Status DC


Lidocaine/Sodium Bicarbonate (Buffered Lidocaine 1%) 9 ml 1X  ONCE IJ  Last 

administered on 8/12/19at 10:20;  Start 8/12/19 at 10:15;  Stop 8/12/19 at 

10:16;  Status DC


Magnesium Hydroxide (Milk Of Magnesia) 2,400 mg PRN DAILY  PRN PO CONSTIPATION 

Last administered on 8/11/19at 21:50;  Start 8/11/19 at 20:30


Midazolam HCl (Versed) 2 mg 1X  ONCE IV  Last administered on 8/12/19at 11:12;  

Start 8/12/19 at 10:15;  Stop 8/12/19 at 10:16;  Status DC


Midazolam HCl (Versed) 2 mg STK-MED ONCE .ROUTE ;  Start 8/12/19 at 09:36;  Stop

8/12/19 at 09:37;  Status DC


Non-Formulary Medication 1 ea DAILY07 PO  Last administered on 8/12/19at 04:56; 

Start 8/11/19 at 16:30


Pantoprazole Sodium (Protonix) 40 mg DAILYAC PO ;  Start 8/13/19 at 07:30


Potassium Chloride (Klor-Con) 20 meq DAILYWBKFT PO  Last administered on 

8/12/19at 09:12;  Start 8/12/19 at 08:00


Potassium Chloride (Klor-Con) 40 meq 1X  ONCE PO  Last administered on 8/11/19at

16:45;  Start 8/11/19 at 15:00;  Stop 8/11/19 at 15:01;  Status DC


Vitals/I & O





Vital Sign - Last 24 Hours








 8/11/19 8/11/19 8/11/19 8/11/19





 15:05 15:38 16:45 19:00


 


Temp 97.4   97.4





 97.4   97.4


 


Pulse 69  69 78


 


Resp 18   16


 


B/P (MAP) 154/63 (93)  154/63 124/59 (80)


 


Pulse Ox 98   99


 


O2 Delivery Room Air Room Air  Room Air


 


    





    





 8/11/19 8/11/19 8/11/19 8/12/19





 20:00 23:12 23:41 03:27


 


Temp   98.4 98.7





   98.4 98.7


 


Pulse  73 73 76


 


Resp   16 16


 


B/P (MAP)  182/74 182/74 (110) 175/77 (109)


 


Pulse Ox   97 96


 


O2 Delivery Room Air  Room Air Room Air


 


    





    





 8/12/19 8/12/19 8/12/19 8/12/19





 03:44 03:44 04:45 07:00


 


Temp    98.7





    98.7


 


Pulse 76 76 75 84


 


Resp    18


 


B/P (MAP) 175/77 175/77 167/78 (107) 159/81 (107)


 


Pulse Ox    93


 


O2 Delivery    Room Air


 


    





    





 8/12/19 8/12/19 8/12/19 8/12/19





 09:12 09:12 09:58 10:03


 


Pulse 84 84 85 82


 


Resp   16 16


 


B/P (MAP) 159/81 159/81  


 


Pulse Ox   97 94


 


O2 Delivery   Room Air 





 8/12/19 8/12/19 8/12/19 8/12/19





 10:08 10:13 10:21 11:00


 


Temp    98.4





    98.4


 


Pulse 79 76 79 76


 


Resp 14 14 16 16


 


B/P (MAP)    146/66 (92)


 


Pulse Ox 95 95 94 95


 


O2 Delivery   Room Air Room Air


 


    





    





 8/12/19   





 11:12   


 


Resp 14   


 


Pulse Ox 95   


 


O2 Delivery Room Air   














Intake and Output   


 


 8/11/19 8/11/19 8/12/19





 14:59 22:59 06:59


 


Intake Total  200 ml 240 ml


 


Balance  200 ml 240 ml

















SABINA LIZAMA MD           Aug 12, 2019 13:01

## 2019-08-12 NOTE — PDOC
PROGRESS NOTES


History of Present Illness


History of Present Illness





VTE Prophylaxis Ordered


VTE Prophylaxis Devices:  No


VTE Pharmacological Prophylaxi:  Yes





Assessment/Plan


Assessment/Plan











Impression:  





TIA (transient ischemic attack)


No hemodynamically significant internal carotid artery stenosis is identified. 

ON CAROTID DOPPLER 8/10


Hypokalemia


Hypomagnesemia


UTI (urinary tract infection)


Remote ischemic changes are identified in the right middle and inferior frontal 

gyri.


Moderate aortic regurgitation.


Trace mitral regurgitation.


RECTAL BLEEDING


There is a heterogeneous mass lateral to the proximal stomach. The mass 


may be exophytic from the stomach. There is an identical appearing large 


lobular mass in the left hepatic lobe. The 2 masses nearly abut. Primary 


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 Other 


etiologies are not excluded.


Abnormal, unintentional weight loss 20# in 6 mo


DISCOID LUPUS


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


 











PLAN





ADMIT


NEUROCHECKS Q 4 HRS


Neurology consult


TELE


CONSIDER MRI BRAIN


pt/ot/st


ASA


DOPPLER CAROTIDS


ECHO


DVT PROPHYLAXIS


GI CONSULT


ZAHRA


LIVER IR biopsy. 8/12

















36 min pt exam, chart review, > 50% of time spent with exam, chart review, pt 

care coordination





Vitals


Vitals





Vital Signs








  Date Time  Temp Pulse Resp B/P (MAP) Pulse Ox O2 Delivery O2 Flow Rate FiO2


 


8/12/19 10:21  79 16  94 Room Air  


 


8/12/19 09:12    159/81    


 


8/12/19 07:00 98.7       





 98.7       











Physical Exam


General:  Alert, Oriented X3, Cooperative, No acute distress, mild distress


Heart:  Regular rate, Normal S1, Normal S2, Other (GR 2/6 WIL )


Lungs:  Clear


Abdomen:  Normal bowel sounds, Soft, No tenderness


Extremities:  No clubbing, No cyanosis, No edema


Skin:  No breakdown





Labs


LABS


Comparison: None.


 


Technique: Helical CT imaging of the abdomen and pelvis is performed after


75 cc of Omnipaque 300 IV contrast. Oral contrast also given.


 


Findings: 


Minimal atelectasis or scarring in the posterior lower lobes bilaterally. 


Mitral annular calcification. Coronary artery disease. Cardiac size 


normal.


 


There is a lobular heterogeneous mass of segments 2 and 3 of the liver. 


The mass measures 7.4 cm AP by 8.7 cm transverse by 9 cm craniocaudal. The


mass displaces and narrows adjacent vessels. The mass extends across 


midline to the left. The liver does not appear cirrhotic.


 


The gallbladder, spleen, pancreas, and adrenal glands are normal. Moderate


atherosclerotic calcification of the abdominal aorta and its branches. The


infrarenal abdominal aorta is ectatic. The kidneys enhance symmetrically, 


no hydronephrosis. There are small bilateral renal cysts.


 


Identical to the liver mass and immediately lateral to the liver mass 


there is a mass just lateral to the proximal stomach or arising from the 


stomach that measures 5.8 cm AP by 5.3 m transverse by 5.27 m 


craniocaudal.


 


There is no dilated small bowel. The appendix is normal. Scattered stool 


in the colon. No colon wall thickening is identified. Subcentimeter 


retroperitoneal lymph nodes. No adenopathy. No abdominal free fluid.


 


The urinary bladder is distended, otherwise normal. Uterus unremarkable. 


No pelvic free fluid.


 


Bones unremarkable.


 


IMPRESSION:


There is a heterogeneous mass lateral to the proximal stomach. The mass 


may be exophytic from the stomach. There is an identical appearing large 


lobular mass in the left hepatic lobe. The 2 masses nearly abut. Primary 


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 Other 


etiologies are not excluded.


 


Electronically signed by: Nick Chaves MD (8/11/2019 11:25 AM) [facility]











Laboratory Tests








Test


 8/11/19


13:26 8/12/19


03:15


 


Tumor Marker Alpha Fetoprotein


 317.2 ng/mL


(0.0-8.3) 





 


White Blood Count


 


 4.1 x10^3/uL


(4.0-11.0)


 


Red Blood Count


 


 4.40 x10^6/uL


(3.50-5.40)


 


Hemoglobin


 


 12.3 g/dL


(12.0-15.5)


 


Hematocrit


 


 36.5 %


(36.0-47.0)


 


Mean Corpuscular Volume  83 fL () 


 


Mean Corpuscular Hemoglobin  28 pg (25-35) 


 


Mean Corpuscular Hemoglobin


Concent 


 34 g/dL


(31-37)


 


Red Cell Distribution Width


 


 15.9 %


(11.5-14.5)


 


Platelet Count


 


 230 x10^3/uL


(140-400)


 


Neutrophils (%) (Auto)  59 % (31-73) 


 


Lymphocytes (%) (Auto)  31 % (24-48) 


 


Monocytes (%) (Auto)  8 % (0-9) 


 


Eosinophils (%) (Auto)  1 % (0-3) 


 


Basophils (%) (Auto)  1 % (0-3) 


 


Neutrophils # (Auto)


 


 2.4 x10^3/uL


(1.8-7.7)


 


Lymphocytes # (Auto)


 


 1.3 x10^3/uL


(1.0-4.8)


 


Monocytes # (Auto)


 


 0.3 x10^3/uL


(0.0-1.1)


 


Eosinophils # (Auto)


 


 0.0 x10^3/uL


(0.0-0.7)


 


Basophils # (Auto)


 


 0.0 x10^3/uL


(0.0-0.2)


 


Sodium Level


 


 145 mmol/L


(136-145)


 


Potassium Level


 


 3.1 mmol/L


(3.5-5.1)


 


Chloride Level


 


 108 mmol/L


()


 


Carbon Dioxide Level


 


 26 mmol/L


(21-32)


 


Anion Gap  11 (6-14) 


 


Blood Urea Nitrogen  8 mg/dL (7-20) 


 


Creatinine


 


 0.7 mg/dL


(0.6-1.0)


 


Estimated GFR


(Cockcroft-Gault) 


 82.7 





 


BUN/Creatinine Ratio  11 (6-20) 


 


Glucose Level


 


 107 mg/dL


(70-99)


 


Calcium Level


 


 8.5 mg/dL


(8.5-10.1)


 


Total Bilirubin


 


 0.4 mg/dL


(0.2-1.0)


 


Aspartate Amino Transf


(AST/SGOT) 


 61 U/L (15-37) 





 


Alanine Aminotransferase


(ALT/SGPT) 


 31 U/L (14-59) 





 


Alkaline Phosphatase


 


 70 U/L


()


 


Total Protein


 


 6.5 g/dL


(6.4-8.2)


 


Albumin


 


 2.6 g/dL


(3.4-5.0)


 


Albumin/Globulin Ratio  0.7 (1.0-1.7) 











Assessment and Plan


Assessmemt and Plan


Problems


Medical Problems:


(1) UTI (urinary tract infection)


Status: Acute  











Comment


Review of Relevant


I have reviewed the following items ignacio (where applicable) has been applied.


Labs





Laboratory Tests








Test


 8/11/19


13:26 8/12/19


03:15


 


Tumor Marker Alpha Fetoprotein


 317.2 ng/mL


(0.0-8.3) 





 


White Blood Count


 


 4.1 x10^3/uL


(4.0-11.0)


 


Red Blood Count


 


 4.40 x10^6/uL


(3.50-5.40)


 


Hemoglobin


 


 12.3 g/dL


(12.0-15.5)


 


Hematocrit


 


 36.5 %


(36.0-47.0)


 


Mean Corpuscular Volume  83 fL () 


 


Mean Corpuscular Hemoglobin  28 pg (25-35) 


 


Mean Corpuscular Hemoglobin


Concent 


 34 g/dL


(31-37)


 


Red Cell Distribution Width


 


 15.9 %


(11.5-14.5)


 


Platelet Count


 


 230 x10^3/uL


(140-400)


 


Neutrophils (%) (Auto)  59 % (31-73) 


 


Lymphocytes (%) (Auto)  31 % (24-48) 


 


Monocytes (%) (Auto)  8 % (0-9) 


 


Eosinophils (%) (Auto)  1 % (0-3) 


 


Basophils (%) (Auto)  1 % (0-3) 


 


Neutrophils # (Auto)


 


 2.4 x10^3/uL


(1.8-7.7)


 


Lymphocytes # (Auto)


 


 1.3 x10^3/uL


(1.0-4.8)


 


Monocytes # (Auto)


 


 0.3 x10^3/uL


(0.0-1.1)


 


Eosinophils # (Auto)


 


 0.0 x10^3/uL


(0.0-0.7)


 


Basophils # (Auto)


 


 0.0 x10^3/uL


(0.0-0.2)


 


Sodium Level


 


 145 mmol/L


(136-145)


 


Potassium Level


 


 3.1 mmol/L


(3.5-5.1)


 


Chloride Level


 


 108 mmol/L


()


 


Carbon Dioxide Level


 


 26 mmol/L


(21-32)


 


Anion Gap  11 (6-14) 


 


Blood Urea Nitrogen  8 mg/dL (7-20) 


 


Creatinine


 


 0.7 mg/dL


(0.6-1.0)


 


Estimated GFR


(Cockcroft-Gault) 


 82.7 





 


BUN/Creatinine Ratio  11 (6-20) 


 


Glucose Level


 


 107 mg/dL


(70-99)


 


Calcium Level


 


 8.5 mg/dL


(8.5-10.1)


 


Total Bilirubin


 


 0.4 mg/dL


(0.2-1.0)


 


Aspartate Amino Transf


(AST/SGOT) 


 61 U/L (15-37) 





 


Alanine Aminotransferase


(ALT/SGPT) 


 31 U/L (14-59) 





 


Alkaline Phosphatase


 


 70 U/L


()


 


Total Protein


 


 6.5 g/dL


(6.4-8.2)


 


Albumin


 


 2.6 g/dL


(3.4-5.0)


 


Albumin/Globulin Ratio  0.7 (1.0-1.7) 








Laboratory Tests








Test


 8/11/19


13:26 8/12/19


03:15


 


Tumor Marker Alpha Fetoprotein


 317.2 ng/mL


(0.0-8.3) 





 


White Blood Count


 


 4.1 x10^3/uL


(4.0-11.0)


 


Red Blood Count


 


 4.40 x10^6/uL


(3.50-5.40)


 


Hemoglobin


 


 12.3 g/dL


(12.0-15.5)


 


Hematocrit


 


 36.5 %


(36.0-47.0)


 


Mean Corpuscular Volume  83 fL () 


 


Mean Corpuscular Hemoglobin  28 pg (25-35) 


 


Mean Corpuscular Hemoglobin


Concent 


 34 g/dL


(31-37)


 


Red Cell Distribution Width


 


 15.9 %


(11.5-14.5)


 


Platelet Count


 


 230 x10^3/uL


(140-400)


 


Neutrophils (%) (Auto)  59 % (31-73) 


 


Lymphocytes (%) (Auto)  31 % (24-48) 


 


Monocytes (%) (Auto)  8 % (0-9) 


 


Eosinophils (%) (Auto)  1 % (0-3) 


 


Basophils (%) (Auto)  1 % (0-3) 


 


Neutrophils # (Auto)


 


 2.4 x10^3/uL


(1.8-7.7)


 


Lymphocytes # (Auto)


 


 1.3 x10^3/uL


(1.0-4.8)


 


Monocytes # (Auto)


 


 0.3 x10^3/uL


(0.0-1.1)


 


Eosinophils # (Auto)


 


 0.0 x10^3/uL


(0.0-0.7)


 


Basophils # (Auto)


 


 0.0 x10^3/uL


(0.0-0.2)


 


Sodium Level


 


 145 mmol/L


(136-145)


 


Potassium Level


 


 3.1 mmol/L


(3.5-5.1)


 


Chloride Level


 


 108 mmol/L


()


 


Carbon Dioxide Level


 


 26 mmol/L


(21-32)


 


Anion Gap  11 (6-14) 


 


Blood Urea Nitrogen  8 mg/dL (7-20) 


 


Creatinine


 


 0.7 mg/dL


(0.6-1.0)


 


Estimated GFR


(Cockcroft-Gault) 


 82.7 





 


BUN/Creatinine Ratio  11 (6-20) 


 


Glucose Level


 


 107 mg/dL


(70-99)


 


Calcium Level


 


 8.5 mg/dL


(8.5-10.1)


 


Total Bilirubin


 


 0.4 mg/dL


(0.2-1.0)


 


Aspartate Amino Transf


(AST/SGOT) 


 61 U/L (15-37) 





 


Alanine Aminotransferase


(ALT/SGPT) 


 31 U/L (14-59) 





 


Alkaline Phosphatase


 


 70 U/L


()


 


Total Protein


 


 6.5 g/dL


(6.4-8.2)


 


Albumin


 


 2.6 g/dL


(3.4-5.0)


 


Albumin/Globulin Ratio  0.7 (1.0-1.7) 








Medications





Current Medications


Sodium Chloride 1,000 ml @  1,000 mls/hr 1X  ONCE IV  Last administered on 

8/9/19at 20:33;  Start 8/9/19 at 20:00;  Stop 8/9/19 at 20:59;  Status DC


Aspirin (Children'S Aspirin) 162 mg 1X  ONCE PO  Last administered on 8/9/19at 

22:05;  Start 8/9/19 at 21:45;  Stop 8/9/19 at 21:46;  Status DC


Potassium Chloride (Klor-Con) 40 meq 1X  ONCE PO  Last administered on 8/9/19at 

22:05;  Start 8/9/19 at 21:45;  Stop 8/9/19 at 21:46;  Status DC


Magnesium Sulfate 50 ml @ 25 mls/hr 1X  ONCE IV  Last administered on 8/9/19at 

22:05;  Start 8/9/19 at 21:30;  Stop 8/9/19 at 23:29;  Status DC


Ceftriaxone Sodium (Rocephin) 1 gm 1X  ONCE IVP  Last administered on 8/9/19at 

22:05;  Start 8/9/19 at 22:00;  Stop 8/9/19 at 22:01;  Status DC


Ondansetron HCl (Zofran) 4 mg PRN Q8HRS  PRN IV NAUSEA/VOMITING;  Start 8/9/19 

at 22:00;  Stop 8/10/19 at 21:59;  Status DC


Alprazolam (Xanax) 0.25 mg BID PO ;  Start 8/10/19 at 00:00;  Stop 8/10/19 at 

00:05;  Status DC


Carvedilol (Coreg) 3.125 mg BIDWMEALS PO  Last administered on 8/12/19at 09:12; 

Start 8/10/19 at 00:00


Alprazolam (Xanax) 0.25 mg PRN BID  PRN PO ANXIETY / AGITATION Last administered

on 8/12/19at 04:52;  Start 8/10/19 at 00:15


Alprazolam (Xanax) 0.25 mg BID PO  Last administered on 8/11/19at 20:16;  Start 

8/10/19 at 00:05


Aspirin (Ecotrin) 81 mg DAILY PO  Last administered on 8/10/19at 12:06;  Start 

8/10/19 at 11:30;  Stop 8/10/19 at 14:14;  Status DC


Atorvastatin Calcium (Lipitor) 40 mg HS PO ;  Start 8/10/19 at 21:00;  Stop 

8/10/19 at 15:28;  Status DC


Citalopram Hydrobromide (CeleXA) 20 mg DAILY PO  Last administered on 8/11/19at 

08:49;  Start 8/10/19 at 12:00


Pantoprazole Sodium (Protonix) 40 mg DAILYAC PO  Last administered on 8/10/19at 

12:06;  Start 8/10/19 at 11:30;  Stop 8/11/19 at 12:36;  Status DC


Aspirin (Ecotrin) 325 mg DAILYWBKFT PO  Last administered on 8/11/19at 08:49;  

Start 8/10/19 at 15:00


Atorvastatin Calcium (Lipitor) 40 mg QHS PO  Last administered on 8/11/19at 

20:16;  Start 8/10/19 at 21:00


Sodium Chloride (Normal Saline Flush) 3 ml QSHIFT  PRN IV AFTER MEDS AND BLOOD 

DRAWS;  Start 8/10/19 at 14:15


Ondansetron HCl (Zofran) 4 mg PRN Q4HRS  PRN IV NAUSEA/VOMITING;  Start 8/10/19 

at 14:15


Acetaminophen (Tylenol) 650 mg PRN Q4HRS  PRN PO TEMP OVER 100.4F OR MILD PAIN; 

Start 8/10/19 at 14:15


Al Hydroxide/Mg Hydroxide (Mylanta Plus Xs) 30 ml PRN DAILY  PRN PO HEARTBURN / 

GAS;  Start 8/10/19 at 14:15


Clonidine HCl (Catapres) 0.1 mg PRN Q6HRS  PRN PO SBP>160 OR DBP>90 Last 

administered on 8/11/19at 23:12;  Start 8/10/19 at 14:15


Docusate Sodium (Colace) 100 mg PRN BID  PRN PO HARD STOOLS;  Start 8/10/19 at 

14:15


Albuterol/ Ipratropium (Duoneb) 3 ml Q4H NEB  Last administered on 8/11/19at 

15:38;  Start 8/10/19 at 14:15


Guaifenesin (Robitussin) 200 mg PRN Q4HRS  PRN PO COUGH;  Start 8/10/19 at 14:15


Ceftriaxone Sodium (Rocephin) 1 gm Q24H IVP  Last administered on 8/11/19at 

21:50;  Start 8/10/19 at 22:00


Potassium Chloride (Klor-Con) 40 meq 1X  ONCE PO  Last administered on 8/10/19at

16:27;  Start 8/10/19 at 17:00;  Stop 8/10/19 at 17:01;  Status DC


Iohexol (Omnipaque 240 Mg/ml) 50 ml 1X  ONCE PO ;  Start 8/10/19 at 16:00;  Stop

8/10/19 at 16:02;  Status DC


Iohexol (Omnipaque 300 Mg/ml) 100 ml 1X  ONCE IV ;  Start 8/10/19 at 16:00;  

Stop 8/10/19 at 16:02;  Status DC


Hydralazine HCl (Apresoline Inj) 10 mg 1X  ONCE IVP  Last administered on 

8/11/19at 05:44;  Start 8/11/19 at 05:30;  Stop 8/11/19 at 05:31;  Status DC


Non-Formulary Medication 1 ea DAILY07 PO  Last administered on 8/12/19at 04:56; 

Start 8/11/19 at 16:30


Lactobacillus Rhamnosus (Culturelle) 1 cap BID PO  Last administered on 

8/11/19at 20:16;  Start 8/11/19 at 21:00


Potassium Chloride (Klor-Con) 40 meq 1X  ONCE PO  Last administered on 8/11/19at

16:45;  Start 8/11/19 at 15:00;  Stop 8/11/19 at 15:01;  Status DC


Potassium Chloride (Klor-Con) 20 meq DAILYWBKFT PO  Last administered on 

8/12/19at 09:12;  Start 8/12/19 at 08:00


Bisacodyl (Dulcolax Supp) 10 mg PRN DAILY  PRN [place] CONSTIPATION;  Start 8/11/19 

at 20:30


Magnesium Hydroxide (Milk Of Magnesia) 2,400 mg PRN DAILY  PRN PO CONSTIPATION 

Last administered on 8/11/19at 21:50;  Start 8/11/19 at 20:30


Hydralazine HCl (Apresoline Inj) 10 mg PRN Q4HRS  PRN IVP ELEVATED BP, SEE 

COMMENTS Last administered on 8/12/19at 03:44;  Start 8/12/19 at 03:45


Amlodipine Besylate (Norvasc) 5 mg DAILY PO  Last administered on 8/12/19at 

09:12;  Start 8/12/19 at 03:45


Lidocaine/Sodium Bicarbonate (Buffered Lidocaine 1%) 3 ml STK-MED ONCE .ROUTE ; 

Start 8/12/19 at 09:06;  Stop 8/12/19 at 09:06;  Status DC


Midazolam HCl (Versed) 2 mg STK-MED ONCE .ROUTE ;  Start 8/12/19 at 09:36;  Stop

8/12/19 at 09:37;  Status DC


Fentanyl Citrate (Fentanyl 2ml Vial) 100 mcg STK-MED ONCE .ROUTE ;  Start 

8/12/19 at 09:37;  Stop 8/12/19 at 09:37;  Status DC


Lidocaine/Sodium Bicarbonate (Buffered Lidocaine 1%) 9 ml 1X  ONCE IJ  Last 

administered on 8/12/19at 10:20;  Start 8/12/19 at 10:15;  Stop 8/12/19 at 

10:16;  Status DC


Midazolam HCl (Versed) 2 mg 1X  ONCE IV ;  Start 8/12/19 at 10:15;  Stop 8/12/19

at 10:16;  Status DC


Fentanyl Citrate (Fentanyl 2ml Vial) 100 mcg 1X  ONCE IV ;  Start 8/12/19 at 

10:15;  Stop 8/12/19 at 10:16;  Status DC





Active Scripts


Active


Reported


Citalopram Hbr (Citalopram Hydrobromide) 20 Mg Tablet 1 Tab PO DAILY


[atrovent hfa]   17 Mcg  QID


Xanax (Alprazolam) 0.5 Mg Tablet 0.5-1 Tab PO BID


Coreg  ** (Carvedilol) 3.125 Mg Tablet 1 Tab PO BID


Nexium Capsule (Esomeprazole Magnesium) 40 Mg Capsule.dr 40 Mg PO DAILYAC


Atorvastatin Calcium 40 Mg Tablet 40 Mg PO HS


Aspir 81 (Aspirin) 81 Mg Tablet.dr 81 Mg PO DAILY


Vitals/I & O





Vital Sign - Last 24 Hours








 8/11/19 8/11/19 8/11/19 8/11/19





 10:51 12:14 15:05 15:38


 


Temp 98.2  97.4 





 98.2  97.4 


 


Pulse 73  69 


 


Resp 18  18 


 


B/P (MAP) 155/65 (95)  154/63 (93) 


 


Pulse Ox 97  98 


 


O2 Delivery Room Air Room Air Room Air Room Air


 


    





    





 8/11/19 8/11/19 8/11/19 8/11/19





 16:45 19:00 20:00 23:12


 


Temp  97.4  





  97.4  


 


Pulse 69 78  73


 


Resp  16  


 


B/P (MAP) 154/63 124/59 (80)  182/74


 


Pulse Ox  99  


 


O2 Delivery  Room Air Room Air 


 


    





    





 8/11/19 8/12/19 8/12/19 8/12/19





 23:41 03:27 03:44 03:44


 


Temp 98.4 98.7  





 98.4 98.7  


 


Pulse 73 76 76 76


 


Resp 16 16  


 


B/P (MAP) 182/74 (110) 175/77 (109) 175/77 175/77


 


Pulse Ox 97 96  


 


O2 Delivery Room Air Room Air  


 


    





    





 8/12/19 8/12/19 8/12/19 8/12/19





 04:45 07:00 09:12 09:12


 


Temp  98.7  





  98.7  


 


Pulse 75 84 84 84


 


Resp  18  


 


B/P (MAP) 167/78 (107) 159/81 (107) 159/81 159/81


 


Pulse Ox  93  


 


O2 Delivery  Room Air  


 


    





    





 8/12/19 8/12/19 8/12/19 8/12/19





 09:58 10:03 10:08 10:13


 


Pulse 85 82 79 76


 


Resp 16 16 14 14


 


Pulse Ox 97 94 95 95


 


O2 Delivery Room Air   





 8/12/19   





 10:21   


 


Pulse 79   


 


Resp 16   


 


Pulse Ox 94   


 


O2 Delivery Room Air   














Intake and Output   


 


 8/11/19 8/11/19 8/12/19





 15:00 23:00 07:00


 


Intake Total  200 ml 240 ml


 


Balance  200 ml 240 ml

















PETER ATKINSON MD          Aug 12, 2019 10:45

## 2019-08-12 NOTE — CARD
MR#: [id number]

Account#: [id number]

Accession#: [id number]

Date of Study: 08/12/2019

Ordering Physician: PETER ATKINSON, 

Referring Physician: PETER ATKINSON 

Tech: Antonia Hathaway RDCS





--------------- APPROVED REPORT --------------





EXAM: Two-dimensional and M-mode echocardiogram with Doppler and color Doppler.



Other Information 

Quality : Good



INDICATION

CVA/TIA 

Aoritc Insufficiency



2D DIMENSIONS 

RVDd2.2 (2.9-3.5cm)Left Atrium(2D)2.7 (1.6-4.0cm)

IVSd1.3 (0.7-1.1cm)Aortic Root(2D)3.0 (2.0-3.7cm)

LVDd4.6 (3.9-5.9cm)LVOT Diameter1.9 (1.8-2.4cm)

PWd1.0 (0.7-1.1cm)LVDs3.1 (2.5-4.0cm)

FS (%) 33.1 %SV61.2 ml

LVEF(%)61.7 (>50%)



Aortic Valve

AoV Peak Jeff.156.3cm/sAoV VTI25.0cm

AO Peak GR.9.8mmHgLVOT Peak Jeff.118.4cm/s

AO Mean GR.5mmHgAVA (VMAX)2.07cm2

CHARLES   (VTI)2.01zv1DY P 1/2 [id number]



Mitral Valve

MV E Ololqxyd09.8cm/sMV DECEL [id number]

MV A Xwhfdqgb47.4cm/sE/A  Ratio0.7



Pulmonary Vein

S1 Bcjksdul94.9cm/sD2 Ejbcgbsg92.3cm/s



 LEFT VENTRICLE 

The left ventricle is normal size. There is normal left ventricular wall thickness. The left ventricu
lar systolic function is normal and the ejection fraction is within normal range. The Ejection Fracti
on is 55-60%. There is normal LV segmental wall motion. Transmitral Doppler flow pattern is Grade I-a
bnormal relaxation pattern.



 RIGHT VENTRICLE 

The right ventricle is normal size. The right ventricular systolic function is normal.



 ATRIA 

The left atrium size is normal. The right atrium size is normal. The interatrial septum is intact wit
h no evidence for an atrial septal defect or patent foramen ovale as noted on 2-D or Doppler imaging.




 AORTIC VALVE 

The aortic valve is mildly thickened but opens well. Doppler and Color Flow revealed mild to moderate
 eccentric aortic regurgitation. There is no significant aortic valvular stenosis.



 MITRAL VALVE 

The mitral valve is calcified but opens well. There is no evidence of mitral valve prolapse. There is
 no mitral valve stenosis. Doppler and Color-flow revealed trace mitral regurgitation.



 TRICUSPID VALVE 

The tricuspid valve is normal in structure and function. Doppler and Color Flow revealed no tricuspid
 valve regurgitation noted. There is no tricuspid valve stenosis.



 PULMONIC VALVE 

The pulmonic valve is not well visualized. Doppler and Color Flow revealed trace pulmonic valvular re
gurgitation. There is no pulmonic valvular stenosis.



 GREAT VESSELS 

The aortic root is normal in size. The ascending aorta is not well seen. The IVC is normal in size an
d collapses >50% with inspiration.



 PERICARDIAL EFFUSION 

There is no evidence of significant pericardial effusion.



Critical Notification

Critical Value: No



<Conclusion>

The left ventricular systolic function is normal and the ejection fraction is within normal range. Th
e Ejection Fraction is 55-60%.

There is normal LV segmental wall motion.

Doppler and Color Flow revealed mild to moderate eccentric aortic regurgitation.



Signed by : Shree Mcclelland, 

Electronically Approved : 08/12/2019 15:00:50

## 2019-08-12 NOTE — NUR
SW following pt for anticipated dc needs. Chart reviewed. Pt lives at home and PT recommends 
home health. SW will arrange home health if ordered by Physician. No other needs noted at 
this time.

## 2019-08-12 NOTE — NUR
SW following pt. Pt completed AD form and is provided with original/copies to take home. A 
copy also placed in pt's chart. Per RN, family is requesting for Palliative care consult. 
Will continue to follow pending dc needs.

## 2019-08-12 NOTE — PDOC
PROGRESS NOTES


Assessment


Assessment


IMPRESSION:


Acute 5 mm left posterior frontal infract.


Slurred speech.


UTI.


HTN.


COPD.


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


GI cancer metastatic to liver.


Weight loss.


Elevated AFP.





RECOMMENDATIONS/PLAN:


Continue  mg daily. 


Continue Lipitor HS.


Consulted GI.


Treat medical diseases.


Discussed with her daughter and son at bedside on 8/12/19.





Past Medical History


Cardiovascular:  CAD


CENTRAL NERVOUS SYSTEM:  Peripheral neuropathy


GI:  Constipation, Hemorrhoids


Heme/Onc:  No pertinent hx


Hepatobiliary:  No pertinent hx


Rheumatologic:  Other (arthritis)


Renal/:  No pertinent hx


Dermatology:  Other (LUPUS)





Family History


Hypertension





Social History


Quit (qiut 20 yrs ago)


ALCOHOL:  none


Drugs:  None





Allergies


Coded Allergies:  


     clopidogrel (Verified  Allergy, Intermediate, Rash, 8/10/19)





MEDICATIONS:


Refer to MAR





REVIEW OF SYSTEMS:


Constitutional: Weight loss.


Head: No traumatic brain or head injury.


Skin: No edema, or rash.


Ear: No infection.


Eyes: No vision loss, or diplopia.


Nose: No bleeding or purulent discharges.


Hearing: Hearing loss.


Neck: No injury.


Breast: No history of cancer, masses, or discharges.


Cardiac: HTN, HLD


Pulmonary: No COPD.


GI: cancer?


Urinary/genital: UTI.


Endocrine: No cousin face, craniofacial dysmorphism.


Skeletomuscular: No muscular atrophy, deformity.


Neurological: see  HP.


Psychiatric: Denies drug use/abuse.


Otherwise, not pertinant14-point review of systems.





PHYSICAL EXAMINATION:   


General appearance in subacute distress.  


HEENT:  Normocephalic and nontraumatic.  Eyes, nose, ears, and throat are 

unremarkable. Hearing decrease.


Neck is supple. No lymphadenopathy. No bruits are heard over the carotid artery.

No Crepitus.


Cardiovascular:  S1, S2, regular rate and rhythm.  


Pulmonary:  Clear to auscultation bilaterally. 


Abdomen:  Bowel sounds are positive.  Abdomen is soft, nontender, and 

nondistended.   


Extremities:  No rash, lesions, or edema.  


No restriction of range of motion





NEUROLOGICAL  EXAMINATION:


Alert.


Partially oriented to time, place and person.


PERRL.


EOMI.


CN: no focal findings.


Muscle tone: within normal.


Muscle strength: 4+


DTR: 1-2


Plantar reflex: Flexor response bilaterally 


Gait: not examined in bed.


Sensory exam: no abnormal findings.


No cerebellar signs elicited.


F-T-N test fine.





Objective


Objective





Vital Signs








  Date Time  Temp Pulse Resp B/P (MAP) Pulse Ox O2 Delivery O2 Flow Rate FiO2


 


8/12/19 19:19     97 Room Air  


 


8/12/19 17:15  72  131/66    


 


8/12/19 15:00 98.6  16     





 98.6       














Intake and Output 


 


 8/12/19





 07:00


 


Intake Total 440 ml


 


Balance 440 ml


 


 


 


Intake Oral 440 ml


 


# Voids 3











Vitals Signs


Vitals





                          VS - Last 72 Hours, by Label








  Date Time  Temp Pulse Resp B/P (MAP) Pulse Ox O2 Delivery O2 Flow Rate FiO2


 


8/12/19 19:19     97 Room Air  


 


8/12/19 17:15  72  131/66    


 


8/12/19 15:00 98.6 72 16 131/66 (87) 97 Room Air  





 98.6       


 


8/12/19 11:12   14  95 Room Air  


 


8/12/19 11:00 98.4 76 16 146/66 (92) 95 Room Air  





 98.4       


 


8/12/19 10:21  79 16  94 Room Air  


 


8/12/19 10:13  76 14  95   


 


8/12/19 10:08  79 14  95   


 


8/12/19 10:03  82 16  94   


 


8/12/19 09:58  85 16  97 Room Air  


 


8/12/19 09:12  84  159/81    


 


8/12/19 09:12  84  159/81    


 


8/12/19 08:00      Room Air  


 


8/12/19 07:00 98.7 84 18 159/81 (107) 93 Room Air  





 98.7       


 


8/12/19 04:45  75  167/78 (107)    


 


8/12/19 03:44  76  175/77    


 


8/12/19 03:44  76  175/77    


 


8/12/19 03:27 98.7 76 16 175/77 (109) 96 Room Air  





 98.7       


 


8/11/19 23:41 98.4 73 16 182/74 (110) 97 Room Air  





 98.4       


 


8/11/19 23:12  73  182/74    


 


8/11/19 20:00      Room Air  


 


8/11/19 19:00 97.4 78 16 124/59 (80) 99 Room Air  





 97.4       


 


8/11/19 16:45  69  154/63    


 


8/11/19 15:38      Room Air  


 


8/11/19 15:05 97.4 69 18 154/63 (93) 98 Room Air  





 97.4       


 


8/11/19 12:14      Room Air  


 


8/11/19 10:51 98.2 73 18 155/65 (95) 97 Room Air  





 98.2       


 


8/11/19 08:49  73  146/71    


 


8/11/19 08:39     99 Room Air  


 


8/11/19 08:00      Room Air  


 


8/11/19 07:18 98.0 73 16 146/71 (96) 97 Room Air  





 98.0       











Laboratory


Laboratory





Laboratory Tests








Test


 8/12/19


03:15


 


White Blood Count


 4.1 x10^3/uL


(4.0-11.0)


 


Red Blood Count


 4.40 x10^6/uL


(3.50-5.40)


 


Hemoglobin


 12.3 g/dL


(12.0-15.5)


 


Hematocrit


 36.5 %


(36.0-47.0)


 


Mean Corpuscular Volume 83 fL () 


 


Mean Corpuscular Hemoglobin 28 pg (25-35) 


 


Mean Corpuscular Hemoglobin


Concent 34 g/dL


(31-37)


 


Red Cell Distribution Width


 15.9 %


(11.5-14.5)


 


Platelet Count


 230 x10^3/uL


(140-400)


 


Neutrophils (%) (Auto) 59 % (31-73) 


 


Lymphocytes (%) (Auto) 31 % (24-48) 


 


Monocytes (%) (Auto) 8 % (0-9) 


 


Eosinophils (%) (Auto) 1 % (0-3) 


 


Basophils (%) (Auto) 1 % (0-3) 


 


Neutrophils # (Auto)


 2.4 x10^3/uL


(1.8-7.7)


 


Lymphocytes # (Auto)


 1.3 x10^3/uL


(1.0-4.8)


 


Monocytes # (Auto)


 0.3 x10^3/uL


(0.0-1.1)


 


Eosinophils # (Auto)


 0.0 x10^3/uL


(0.0-0.7)


 


Basophils # (Auto)


 0.0 x10^3/uL


(0.0-0.2)


 


Sodium Level


 145 mmol/L


(136-145)


 


Potassium Level


 3.1 mmol/L


(3.5-5.1)


 


Chloride Level


 108 mmol/L


()


 


Carbon Dioxide Level


 26 mmol/L


(21-32)


 


Anion Gap 11 (6-14) 


 


Blood Urea Nitrogen 8 mg/dL (7-20) 


 


Creatinine


 0.7 mg/dL


(0.6-1.0)


 


Estimated GFR


(Cockcroft-Gault) 82.7 





 


BUN/Creatinine Ratio 11 (6-20) 


 


Glucose Level


 107 mg/dL


(70-99)


 


Calcium Level


 8.5 mg/dL


(8.5-10.1)


 


Total Bilirubin


 0.4 mg/dL


(0.2-1.0)


 


Aspartate Amino Transf


(AST/SGOT) 61 U/L (15-37) 





 


Alanine Aminotransferase


(ALT/SGPT) 31 U/L (14-59) 





 


Alkaline Phosphatase


 70 U/L


()


 


Total Protein


 6.5 g/dL


(6.4-8.2)


 


Albumin


 2.6 g/dL


(3.4-5.0)


 


Albumin/Globulin Ratio 0.7 (1.0-1.7) 








Microbiology


8/9/19 Urine Culture - Final, Complete


         


8/9/19 Urine Culture Result 1 (SAMUEL) - Final, Complete





Medication


Medications





Current Medications


Amlodipine Besylate (Norvasc) 5 mg DAILY PO  Last administered on 8/12/19at 

09:12;  Start 8/12/19 at 03:45


Bisacodyl (Dulcolax Supp) 10 mg PRN DAILY  PRN ID CONSTIPATION;  Start 8/11/19 

at 20:30


Fentanyl Citrate (Fentanyl 2ml Vial) 100 mcg 1X  ONCE IV  Last administered on 

8/12/19at 11:12;  Start 8/12/19 at 10:15;  Stop 8/12/19 at 10:16;  Status DC


Fentanyl Citrate (Fentanyl 2ml Vial) 100 mcg STK-MED ONCE .ROUTE ;  Start 

8/12/19 at 09:37;  Stop 8/12/19 at 09:37;  Status DC


Hydralazine HCl (Apresoline Inj) 10 mg PRN Q4HRS  PRN IVP ELEVATED BP, SEE 

COMMENTS Last administered on 8/12/19at 03:44;  Start 8/12/19 at 03:45


Lactobacillus Rhamnosus (Culturelle) 1 cap BID PO  Last administered on 

8/11/19at 20:16;  Start 8/11/19 at 21:00


Lidocaine/Sodium Bicarbonate (Buffered Lidocaine 1%) 3 ml STK-MED ONCE .ROUTE ; 

Start 8/12/19 at 09:06;  Stop 8/12/19 at 09:06;  Status DC


Lidocaine/Sodium Bicarbonate (Buffered Lidocaine 1%) 9 ml 1X  ONCE IJ  Last 

administered on 8/12/19at 10:20;  Start 8/12/19 at 10:15;  Stop 8/12/19 at 

10:16;  Status DC


Magnesium Hydroxide (Milk Of Magnesia) 2,400 mg PRN DAILY  PRN PO CONSTIPATION 

Last administered on 8/11/19at 21:50;  Start 8/11/19 at 20:30


Midazolam HCl (Versed) 2 mg 1X  ONCE IV  Last administered on 8/12/19at 11:12;  

Start 8/12/19 at 10:15;  Stop 8/12/19 at 10:16;  Status DC


Midazolam HCl (Versed) 2 mg STK-MED ONCE .ROUTE ;  Start 8/12/19 at 09:36;  Stop

8/12/19 at 09:37;  Status DC


Pantoprazole Sodium (Protonix) 40 mg DAILYAC PO ;  Start 8/13/19 at 07:30


Potassium Chloride (Klor-Con) 20 meq DAILYWBKFT PO  Last administered on 

8/12/19at 09:12;  Start 8/12/19 at 08:00


Potassium Chloride (Klor-Con) 40 meq 1X  ONCE PO  Last administered on 8/12/19at

17:15;  Start 8/12/19 at 14:00;  Stop 8/12/19 at 14:01;  Status DC





Comment


Review of Relevant


I have reviewed the following items ignacio (where applicable) has been applied.











ANTONIA MCGILL MD                 Aug 12, 2019 19:32

## 2019-08-13 VITALS — SYSTOLIC BLOOD PRESSURE: 131 MMHG | DIASTOLIC BLOOD PRESSURE: 79 MMHG

## 2019-08-13 VITALS — SYSTOLIC BLOOD PRESSURE: 140 MMHG | DIASTOLIC BLOOD PRESSURE: 71 MMHG

## 2019-08-13 VITALS — SYSTOLIC BLOOD PRESSURE: 127 MMHG | DIASTOLIC BLOOD PRESSURE: 55 MMHG

## 2019-08-13 VITALS — DIASTOLIC BLOOD PRESSURE: 80 MMHG | SYSTOLIC BLOOD PRESSURE: 140 MMHG

## 2019-08-13 VITALS — DIASTOLIC BLOOD PRESSURE: 76 MMHG | SYSTOLIC BLOOD PRESSURE: 136 MMHG

## 2019-08-13 LAB
ALBUMIN SERPL-MCNC: 2.5 G/DL (ref 3.4–5)
ALBUMIN/GLOB SERPL: 0.6 {RATIO} (ref 1–1.7)
ALP SERPL-CCNC: 74 U/L (ref 46–116)
ALT SERPL-CCNC: 34 U/L (ref 14–59)
ANION GAP SERPL CALC-SCNC: 9 MMOL/L (ref 6–14)
AST SERPL-CCNC: 85 U/L (ref 15–37)
BASOPHILS # BLD AUTO: 0 X10^3/UL (ref 0–0.2)
BASOPHILS NFR BLD: 1 % (ref 0–3)
BILIRUB SERPL-MCNC: 0.4 MG/DL (ref 0.2–1)
BUN SERPL-MCNC: 11 MG/DL (ref 7–20)
BUN/CREAT SERPL: 14 (ref 6–20)
CALCIUM SERPL-MCNC: 8.4 MG/DL (ref 8.5–10.1)
CHLORIDE SERPL-SCNC: 108 MMOL/L (ref 98–107)
CO2 SERPL-SCNC: 24 MMOL/L (ref 21–32)
CREAT SERPL-MCNC: 0.8 MG/DL (ref 0.6–1)
EOSINOPHIL NFR BLD: 0 X10^3/UL (ref 0–0.7)
EOSINOPHIL NFR BLD: 1 % (ref 0–3)
ERYTHROCYTE [DISTWIDTH] IN BLOOD BY AUTOMATED COUNT: 16.3 % (ref 11.5–14.5)
GFR SERPLBLD BASED ON 1.73 SQ M-ARVRAT: 70.9 ML/MIN
GLOBULIN SER-MCNC: 3.9 G/DL (ref 2.2–3.8)
GLUCOSE SERPL-MCNC: 103 MG/DL (ref 70–99)
HCT VFR BLD CALC: 35.5 % (ref 36–47)
HGB BLD-MCNC: 12 G/DL (ref 12–15.5)
LYMPHOCYTES # BLD: 1.1 X10^3/UL (ref 1–4.8)
LYMPHOCYTES NFR BLD AUTO: 29 % (ref 24–48)
MCH RBC QN AUTO: 28 PG (ref 25–35)
MCHC RBC AUTO-ENTMCNC: 34 G/DL (ref 31–37)
MCV RBC AUTO: 83 FL (ref 79–100)
MONO #: 0.4 X10^3/UL (ref 0–1.1)
MONOCYTES NFR BLD: 9 % (ref 0–9)
NEUT #: 2.3 X10^3/UL (ref 1.8–7.7)
NEUTROPHILS NFR BLD AUTO: 60 % (ref 31–73)
PLATELET # BLD AUTO: 248 X10^3/UL (ref 140–400)
POTASSIUM SERPL-SCNC: 3.7 MMOL/L (ref 3.5–5.1)
PROT SERPL-MCNC: 6.4 G/DL (ref 6.4–8.2)
RBC # BLD AUTO: 4.27 X10^6/UL (ref 3.5–5.4)
SODIUM SERPL-SCNC: 141 MMOL/L (ref 136–145)
WBC # BLD AUTO: 3.8 X10^3/UL (ref 4–11)

## 2019-08-13 PROCEDURE — 0FB23ZX EXCISION OF LEFT LOBE LIVER, PERCUTANEOUS APPROACH, DIAGNOSTIC: ICD-10-PCS | Performed by: RADIOLOGY

## 2019-08-13 RX ADMIN — CARVEDILOL SCH MG: 3.12 TABLET, FILM COATED ORAL at 17:21

## 2019-08-13 RX ADMIN — Medication SCH CAP: at 21:24

## 2019-08-13 RX ADMIN — ATORVASTATIN CALCIUM SCH MG: 40 TABLET, FILM COATED ORAL at 21:24

## 2019-08-13 RX ADMIN — ALBUTEROL SULFATE PRN MG: 108 AEROSOL, METERED RESPIRATORY (INHALATION) at 16:41

## 2019-08-13 RX ADMIN — PANTOPRAZOLE SODIUM SCH MG: 40 TABLET, DELAYED RELEASE ORAL at 09:53

## 2019-08-13 RX ADMIN — CARVEDILOL SCH MG: 3.12 TABLET, FILM COATED ORAL at 09:51

## 2019-08-13 RX ADMIN — POTASSIUM CHLORIDE SCH MEQ: 1500 TABLET, EXTENDED RELEASE ORAL at 09:50

## 2019-08-13 RX ADMIN — CEFTRIAXONE SCH GM: 1 INJECTION, POWDER, FOR SOLUTION INTRAMUSCULAR; INTRAVENOUS at 21:24

## 2019-08-13 RX ADMIN — CITALOPRAM HYDROBROMIDE SCH MG: 20 TABLET ORAL at 09:51

## 2019-08-13 RX ADMIN — Medication SCH CAP: at 09:49

## 2019-08-13 RX ADMIN — ASPIRIN SCH MG: 325 TABLET, DELAYED RELEASE ORAL at 09:50

## 2019-08-13 NOTE — PDOC2
PALLIATIVE CARE


Palliative Care Note


Palliative Care


Consult requested by Dr. Bruton to address goals of care. 


Medical Assessment per medical record. 





TIA (transient ischemic attack)


No hemodynamically significant internal carotid artery stenosis is identified. 

ON CAROTID DOPPLER 8/10


Hypokalemia


Hypomagnesemia


UTI (urinary tract infection)


Remote ischemic changes are identified in the right middle and inferior frontal 

gyri.


Moderate aortic regurgitation.


Trace mitral regurgitation.


RECTAL BLEEDING


There is a heterogeneous mass lateral to the proximal stomach. The mass 


may be exophytic from the stomach. There is an identical appearing large 


lobular mass in the left hepatic lobe. The 2 masses nearly abut. Primary 


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 Other 


etiologies are not excluded.


Abnormal, unintentional weight loss 20# in 6 mo


DISCOID LUPUS


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





spoke with patient.  states she wants to go home today. 


Reluctant to arrange family meeting. 





Patient is alert and understands that she has had TIA and is awaiting results of

Liver Bx.





Spoke with son Caio.  


Plan family meeting tomorrow at 1630.  


 














RAN MASON                  Aug 13, 2019 15:48

## 2019-08-13 NOTE — PDOC
PROGRESS NOTES


History of Present Illness


History of Present Illness





VTE Prophylaxis Ordered


VTE Prophylaxis Devices:  No


VTE Pharmacological Prophylaxi:  Yes





Assessment/Plan


Assessment/Plan











Impression:  





TIA (transient ischemic attack)


Acute 5 mm left posterior frontal infract.


Slurred speech.


No hemodynamically significant internal carotid artery stenosis is identified. 

ON CAROTID DOPPLER 8/10


Hypokalemia


Hypomagnesemia


UTI (urinary tract infection)


Remote ischemic changes are identified in the right middle and inferior frontal 

gyri.


Moderate aortic regurgitation.


Trace mitral regurgitation.


RECTAL BLEEDING


There is a heterogeneous mass lateral to the proximal stomach. The mass 


may be exophytic from the stomach. There is an identical appearing large 


lobular mass in the left hepatic lobe. The 2 masses nearly abut. Primary 


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 Other 


etiologies are not excluded.


Abnormal, unintentional weight loss 20# in 6 mo


DISCOID LUPUS


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


 











PLAN





ADMIT


NEUROCHECKS Q 4 HRS


Neurology consult


TELE


CONSIDER MRI BRAIN


pt/ot/st


ASA


DOPPLER CAROTIDS


ECHO


DVT PROPHYLAXIS


GI CONSULT


ZAHRA


LIVER IR biopsy. 


2-D echo showed normal LV systolic function with mild to moderate aortic 

insufficiency. Plan outpatient event monitor


 Await liver bx results.  

















36 min pt exam, chart review, > 50% of time spent with exam, chart review, pt 

care coordination





Vitals


Vitals





Vital Signs








  Date Time  Temp Pulse Resp B/P (MAP) Pulse Ox O2 Delivery O2 Flow Rate FiO2


 


19 07:30 99.2 83 18 136/76 (96) 95 Room Air  





 99.2       











Physical Exam


General:  Alert, Oriented X3, Cooperative, No acute distress


Heart:  Regular rate, Normal S1, Normal S2, Other (GR 2/6 WIL )


Lungs:  Clear


Abdomen:  Normal bowel sounds


Extremities:  No clubbing, No cyanosis, No edema


Skin:  No breakdown





Labs


LABS


 

--------------------------------------------------------------------------------


------------





PATIENT: CAMERON MUNOZ ANGELINE                 ACCT: [id number] LOC:  [address] [place] [address]      

U: [id number]


                                       AGE/SX: 69/F         ROOM: 662        

RE19


REG DR:  PETER ATKINSON MD         :    1950   BED:  1          

DIS:         


                                       STATUS: ADM IN       TLOC:           


-----------------------------------------

---------------------------------------------------








SPEC #: [id number]       BRAD:      STATUS:  COMP           REQ 

#: [id number]


                            RECD:      SUBM DR: HARRIETT RAYMOND DO 

          


SOURCE: VOID                ENTR:      OTHR DR: EN BRITTON MD 

          


SPDESC:                                                                         

          


ORDERED:  URINE CULTURE                                                         

 


-------

--------------------------------------------------------------------------------


-----





  Procedure                         Result                                      

         


------------------------------------------------------------------------------

--------------





  URINE CULTURE  Final  


        Final report





  URINE CULTURE RES 1  Final  


        Comment





        Mixed urogenital ariana


        10,000-25,000 colony forming units per mL


        Performed at:  [facility]


        [address] [facility] [address], [place], [place]  [id number]


        : KULDIP Dobbs MD, Phone:  [phone]





------------------------------------

--------------------------------------------------------





Assessment and Plan


Assessmemt and Plan


Problems


Medical Problems:


(1) UTI (urinary tract infection)


Status: Acute  











Comment


Review of Relevant


I have reviewed the following items ignacio (where applicable) has been applied.


Labs





Laboratory Tests








Test


 19


13:26 19


03:15


 


Tumor Marker Alpha Fetoprotein


 317.2 ng/mL


(0.0-8.3) 





 


White Blood Count


 


 4.1 x10^3/uL


(4.0-11.0)


 


Red Blood Count


 


 4.40 x10^6/uL


(3.50-5.40)


 


Hemoglobin


 


 12.3 g/dL


(12.0-15.5)


 


Hematocrit


 


 36.5 %


(36.0-47.0)


 


Mean Corpuscular Volume  83 fL () 


 


Mean Corpuscular Hemoglobin  28 pg (25-35) 


 


Mean Corpuscular Hemoglobin


Concent 


 34 g/dL


(31-37)


 


Red Cell Distribution Width


 


 15.9 %


(11.5-14.5)


 


Platelet Count


 


 230 x10^3/uL


(140-400)


 


Neutrophils (%) (Auto)  59 % (31-73) 


 


Lymphocytes (%) (Auto)  31 % (24-48) 


 


Monocytes (%) (Auto)  8 % (0-9) 


 


Eosinophils (%) (Auto)  1 % (0-3) 


 


Basophils (%) (Auto)  1 % (0-3) 


 


Neutrophils # (Auto)


 


 2.4 x10^3/uL


(1.8-7.7)


 


Lymphocytes # (Auto)


 


 1.3 x10^3/uL


(1.0-4.8)


 


Monocytes # (Auto)


 


 0.3 x10^3/uL


(0.0-1.1)


 


Eosinophils # (Auto)


 


 0.0 x10^3/uL


(0.0-0.7)


 


Basophils # (Auto)


 


 0.0 x10^3/uL


(0.0-0.2)


 


Sodium Level


 


 145 mmol/L


(136-145)


 


Potassium Level


 


 3.1 mmol/L


(3.5-5.1)


 


Chloride Level


 


 108 mmol/L


()


 


Carbon Dioxide Level


 


 26 mmol/L


(21-32)


 


Anion Gap  11 (6-14) 


 


Blood Urea Nitrogen  8 mg/dL (7-20) 


 


Creatinine


 


 0.7 mg/dL


(0.6-1.0)


 


Estimated GFR


(Cockcroft-Gault) 


 82.7 





 


BUN/Creatinine Ratio  11 (6-20) 


 


Glucose Level


 


 107 mg/dL


(70-99)


 


Calcium Level


 


 8.5 mg/dL


(8.5-10.1)


 


Total Bilirubin


 


 0.4 mg/dL


(0.2-1.0)


 


Aspartate Amino Transf


(AST/SGOT) 


 61 U/L (15-37) 





 


Alanine Aminotransferase


(ALT/SGPT) 


 31 U/L (14-59) 





 


Alkaline Phosphatase


 


 70 U/L


()


 


Total Protein


 


 6.5 g/dL


(6.4-8.2)


 


Albumin


 


 2.6 g/dL


(3.4-5.0)


 


Albumin/Globulin Ratio  0.7 (1.0-1.7) 








Microbiology


19 Urine Culture - Final, Complete


         


19 Urine Culture Result 1 (SAMUEL) - Final, Complete


Medications





Current Medications


Sodium Chloride 1,000 ml @  1,000 mls/hr 1X  ONCE IV  Last administered on 

19at 20:33;  Start 19 at 20:00;  Stop 19 at 20:59;  Status DC


Aspirin (Children'S Aspirin) 162 mg 1X  ONCE PO  Last administered on 19at 

22:05;  Start 19 at 21:45;  Stop 19 at 21:46;  Status DC


Potassium Chloride (Klor-Con) 40 meq 1X  ONCE PO  Last administered on 19at 

22:05;  Start 19 at 21:45;  Stop 19 at 21:46;  Status DC


Magnesium Sulfate 50 ml @ 25 mls/hr 1X  ONCE IV  Last administered on 19at 

22:05;  Start 19 at 21:30;  Stop 19 at 23:29;  Status DC


Ceftriaxone Sodium (Rocephin) 1 gm 1X  ONCE IVP  Last administered on 19at 

22:05;  Start 19 at 22:00;  Stop 19 at 22:01;  Status DC


Ondansetron HCl (Zofran) 4 mg PRN Q8HRS  PRN IV NAUSEA/VOMITING;  Start 19 

at 22:00;  Stop 8/10/19 at 21:59;  Status DC


Alprazolam (Xanax) 0.25 mg BID PO ;  Start 8/10/19 at 00:00;  Stop 8/10/19 at 

00:05;  Status DC


Carvedilol (Coreg) 3.125 mg BIDWMEALS PO  Last administered on  17:15; 

Start 8/10/19 at 00:00


Alprazolam (Xanax) 0.25 mg PRN BID  PRN PO ANXIETY / AGITATION Last administered

on  04:52;  Start 8/10/19 at 00:15


Alprazolam (Xanax) 0.25 mg BID PO  Last administered on  21:43;  Start 

8/10/19 at 00:05


Aspirin (Ecotrin) 81 mg DAILY PO  Last administered on 8/10/19at 12:06;  Start 

8/10/19 at 11:30;  Stop 8/10/19 at 14:14;  Status DC


Atorvastatin Calcium (Lipitor) 40 mg HS PO ;  Start 8/10/19 at 21:00;  Stop 

8/10/19 at 15:28;  Status DC


Citalopram Hydrobromide (CeleXA) 20 mg DAILY PO  Last administered on 19at 

08:49;  Start 8/10/19 at 12:00


Pantoprazole Sodium (Protonix) 40 mg DAILYAC PO  Last administered on 8/10/19at 

12:06;  Start 8/10/19 at 11:30;  Stop 19 at 12:36;  Status DC


Aspirin (Ecotrin) 325 mg DAILYWBKFT PO  Last administered on 19at 08:49;  

Start 8/10/19 at 15:00


Atorvastatin Calcium (Lipitor) 40 mg QHS PO  Last administered on 19at 

21:43;  Start 8/10/19 at 21:00


Sodium Chloride (Normal Saline Flush) 3 ml QSHIFT  PRN IV AFTER MEDS AND BLOOD 

DRAWS;  Start 8/10/19 at 14:15


Ondansetron HCl (Zofran) 4 mg PRN Q4HRS  PRN IV NAUSEA/VOMITING;  Start 8/10/19 

at 14:15


Acetaminophen (Tylenol) 650 mg PRN Q4HRS  PRN PO TEMP OVER 100.4F OR MILD PAIN; 

Start 8/10/19 at 14:15


Al Hydroxide/Mg Hydroxide (Mylanta Plus Xs) 30 ml PRN DAILY  PRN PO HEARTBURN / 

GAS;  Start 8/10/19 at 14:15


Clonidine HCl (Catapres) 0.1 mg PRN Q6HRS  PRN PO SBP>160 OR DBP>90 Last 

administered on 19at 01:04;  Start 8/10/19 at 14:15


Docusate Sodium (Colace) 100 mg PRN BID  PRN PO HARD STOOLS;  Start 8/10/19 at 

14:15


Albuterol/ Ipratropium (Duoneb) 3 ml Q4H NEB  Last administered on 19at 

20:06;  Start 8/10/19 at 14:15;  Stop 19 at 21:37;  Status DC


Guaifenesin (Robitussin) 200 mg PRN Q4HRS  PRN PO COUGH;  Start 8/10/19 at 14:15


Ceftriaxone Sodium (Rocephin) 1 gm Q24H IVP  Last administered on 19at 

21:43;  Start 8/10/19 at 22:00


Potassium Chloride (Klor-Con) 40 meq 1X  ONCE PO  Last administered on 8/10/19at

16:27;  Start 8/10/19 at 17:00;  Stop 8/10/19 at 17:01;  Status DC


Iohexol (Omnipaque 240 Mg/ml) 50 ml 1X  ONCE PO ;  Start 8/10/19 at 16:00;  Stop

8/10/19 at 16:02;  Status DC


Iohexol (Omnipaque 300 Mg/ml) 100 ml 1X  ONCE IV ;  Start 8/10/19 at 16:00;  

Stop 8/10/19 at 16:02;  Status DC


Hydralazine HCl (Apresoline Inj) 10 mg 1X  ONCE IVP  Last administered on 

19at 05:44;  Start 19 at 05:30;  Stop 19 at 05:31;  Status DC


Non-Formulary Medication 1 ea DAILY07 PO  Last administered on 19at 05:51; 

Start 19 at 16:30


Lactobacillus Rhamnosus (Culturelle) 1 cap BID PO  Last administered on 

19at 21:43;  Start 19 at 21:00


Potassium Chloride (Klor-Con) 40 meq 1X  ONCE PO  Last administered on 19at

16:45;  Start 19 at 15:00;  Stop 19 at 15:01;  Status DC


Potassium Chloride (Klor-Con) 20 meq DAILYWBKFT PO  Last administered on 

19at 09:12;  Start 19 at 08:00


Bisacodyl (Dulcolax Supp) 10 mg PRN DAILY  PRN NV CONSTIPATION;  Start 19 

at 20:30


Magnesium Hydroxide (Milk Of Magnesia) 2,400 mg PRN DAILY  PRN PO CONSTIPATION 

Last administered on 19at 21:50;  Start 19 at 20:30


Hydralazine HCl (Apresoline Inj) 10 mg PRN Q4HRS  PRN IVP ELEVATED BP, SEE 

COMMENTS Last administered on 19at 03:44;  Start 19 at 03:45


Amlodipine Besylate (Norvasc) 5 mg DAILY PO  Last administered on 19at 

09:12;  Start 19 at 03:45


Lidocaine/Sodium Bicarbonate (Buffered Lidocaine 1%) 3 ml STK-MED ONCE .ROUTE ; 

Start 19 at 09:06;  Stop 19 at 09:06;  Status DC


Midazolam HCl (Versed) 2 mg STK-MED ONCE .ROUTE ;  Start 19 at 09:36;  Stop

19 at 09:37;  Status DC


Fentanyl Citrate (Fentanyl 2ml Vial) 100 mcg STK-MED ONCE .ROUTE ;  Start 

19 at 09:37;  Stop 19 at 09:37;  Status DC


Lidocaine/Sodium Bicarbonate (Buffered Lidocaine 1%) 9 ml 1X  ONCE IJ  Last 

administered on 19at 10:20;  Start 19 at 10:15;  Stop 19 at 

10:16;  Status DC


Midazolam HCl (Versed) 2 mg 1X  ONCE IV  Last administered on 19at 11:12;  

Start 19 at 10:15;  Stop 19 at 10:16;  Status DC


Fentanyl Citrate (Fentanyl 2ml Vial) 100 mcg 1X  ONCE IV  Last administered on 

19at 11:12;  Start 19 at 10:15;  Stop 19 at 10:16;  Status DC


Pantoprazole Sodium (Protonix) 40 mg DAILYAC PO ;  Start 19 at 07:30


Potassium Chloride (Klor-Con) 40 meq 1X  ONCE PO  Last administered on 19at

17:15;  Start 19 at 14:00;  Stop 19 at 14:01;  Status DC


Albuterol Sulfate (Ventolin Neb Soln) 2.5 mg PRN Q4HRS  PRN NEB SHORTNESS OF 

BREATH;  Start 19 at 21:45





Active Scripts


Active


Reported


Citalopram Hbr (Citalopram Hydrobromide) 20 Mg Tablet 1 Tab PO DAILY


[atrovent hfa]   17 Mcg  QID


Xanax (Alprazolam) 0.5 Mg Tablet 0.5-1 Tab PO BID


Coreg  ** (Carvedilol) 3.125 Mg Tablet 1 Tab PO BID


Nexium Capsule (Esomeprazole Magnesium) 40 Mg Capsule.dr 40 Mg PO DAILYAC


Atorvastatin Calcium 40 Mg Tablet 40 Mg PO HS


Aspir 81 (Aspirin) 81 Mg Tablet.dr 81 Mg PO DAILY


Vitals/I & O





Vital Sign - Last 24 Hours








 19





 09:58 10:03 10:08 10:13


 


Pulse 85 82 79 76


 


Resp 16 16 14 14


 


Pulse Ox 97 94 95 95


 


O2 Delivery Room Air   





 19





 10:21 11:00 11:12 15:00


 


Temp  98.4  98.6





  98.4  98.6


 


Pulse 79 76  72


 


Resp 16 16 14 16


 


B/P (MAP)  146/66 (92)  131/66 (87)


 


Pulse Ox 94 95 95 97


 


O2 Delivery Room Air Room Air Room Air Room Air


 


    





    





 19/19





 17:15 19:19 19:33 20:00


 


Temp   97.8 





   97.8 


 


Pulse 72  78 


 


Resp   18 


 


B/P (MAP) 131/66  124/66 (85) 


 


Pulse Ox  97 97 


 


O2 Delivery  Room Air Room Air Room Air


 


    





    





 19





 20:07 23:40 01:04 03:25


 


Temp  98.9  97.3





  98.9  97.3


 


Pulse  90 90 87


 


Resp  16  16


 


B/P (MAP)  162/91 (114) 162/91 140/80 (100)


 


Pulse Ox 97 93  95


 


O2 Delivery Room Air Room Air  Room Air


 


    





    





 19   





 07:30   


 


Temp 99.2   





 99.2   


 


Pulse 83   


 


Resp 18   


 


B/P (MAP) 136/76 (96)   


 


Pulse Ox 95   


 


O2 Delivery Room Air   














Intake and Output   


 


 19





 15:00 23:00 07:00


 


Intake Total 220 ml 250 ml 


 


Balance 220 ml 250 ml 

















PETER ATKINSON MD          Aug 13, 2019 09:35

## 2019-08-13 NOTE — PDOC
PROGRESS NOTES


Assessment


Assessment


Acute 5 mm left posterior frontal infract.


Slurred speech.


UTI.


HTN.


COPD.


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


GI cancer metastatic to liver.


Weight loss.


Elevated AFP.





RECOMMENDATIONS/PLAN:


FU liver biopsy reports.


Continue  mg daily. 


Continue Lipitor HS.


Consulted GI.


Treat medical diseases.


Discussed with her daughter and son at bedside on 8/12/19.





Past Medical History


Cardiovascular:  CAD


CENTRAL NERVOUS SYSTEM:  Peripheral neuropathy


GI:  Constipation, Hemorrhoids


Heme/Onc:  No pertinent hx


Hepatobiliary:  No pertinent hx


Rheumatologic:  Other (arthritis)


Renal/:  No pertinent hx


Dermatology:  Other (LUPUS)





Family History


Hypertension





Social History


Quit 20 yrs ago.


ALCOHOL:  none


Drugs:  None





Allergies


Coded Allergies:  


Clopidogrel (Verified  Allergy, Intermediate, Rash, 8/10/19)





MEDICATIONS:


Refer to MAR





REVIEW OF SYSTEMS:


Constitutional: Weight loss.


Head: No traumatic brain or head injury.


Skin: No edema, or rash.


Ear: No infection.


Eyes: No vision loss, or diplopia.


Nose: No bleeding or purulent discharges.


Hearing: Hearing loss.


Neck: No injury.


Breast: No history of cancer, masses, or discharges.


Cardiac: HTN, HLD


Pulmonary: No COPD.


GI: cancer?


Urinary/genital: UTI.


Endocrine: No cousin face, craniofacial dysmorphism.


Skeletomuscular: No muscular atrophy, deformity.


Neurological: see  HP.


Psychiatric: Denies drug use/abuse.


Otherwise, not pertinant14-point review of systems.





PHYSICAL EXAMINATION:   


General appearance in subacute distress.  


HEENT:  Normocephalic and nontraumatic.  Eyes, nose, ears, and throat are 

unremarkable. Hearing decrease.


Neck is supple. No lymphadenopathy. No bruits are heard over the carotid artery.

No Crepitus.


Cardiovascular:  S1, S2, regular rate and rhythm.  


Pulmonary:  Clear to auscultation bilaterally. 


Abdomen:  Bowel sounds are positive.  Abdomen is soft, nontender, and 

nondistended.   


Extremities:  No rash, lesions, or edema.  


No restriction of range of motion





NEUROLOGICAL  EXAMINATION:


Alert.


Partially oriented to time, place and person.


PERRL.


EOMI.


CN: no focal findings.


Muscle tone: within normal.


Muscle strength: 4+


DTR: 1-2


Plantar reflex: Flexor response bilaterally 


Gait: not examined in bed.


Sensory exam: no abnormal findings.


No cerebellar signs elicited.


F-T-N test fine.





Objective


Objective





Vital Signs








  Date Time  Temp Pulse Resp B/P (MAP) Pulse Ox O2 Delivery O2 Flow Rate FiO2


 


8/13/19 19:02 97.8 73 19 127/55 (79) 97 Room Air  





 97.8       














Intake and Output 


 


 8/13/19





 07:00


 


Intake Total 470 ml


 


Balance 470 ml


 


 


 


Intake Oral 470 ml


 


# Voids 1











Vitals Signs


Vitals





                          VS - Last 72 Hours, by Label








  Date Time  Temp Pulse Resp B/P (MAP) Pulse Ox O2 Delivery O2 Flow Rate FiO2


 


8/13/19 19:02 97.8 73 19 127/55 (79) 97 Room Air  





 97.8       


 


8/13/19 17:21  71  140/71    


 


8/13/19 16:42     99 Room Air  


 


8/13/19 15:30 98.0 71 20 140/71 (94) 95 Room Air  





 98.0       


 


8/13/19 11:00 98.4 78 18 131/79 (96) 95 Room Air  





 98.4       


 


8/13/19 09:53  83  136/76    


 


8/13/19 09:53  83  136/76    


 


8/13/19 08:00      Room Air  


 


8/13/19 07:30 99.2 83 18 136/76 (96) 95 Room Air  





 99.2       


 


8/13/19 03:25 97.3 87 16 140/80 (100) 95 Room Air  





 97.3       


 


8/13/19 01:04  90  162/91    


 


8/12/19 23:40 98.9 90 16 162/91 (114) 93 Room Air  





 98.9       


 


8/12/19 20:07     97 Room Air  


 


8/12/19 20:00      Room Air  


 


8/12/19 19:33 97.8 78 18 124/66 (85) 97 Room Air  





 97.8       


 


8/12/19 19:19     97 Room Air  


 


8/12/19 17:15  72  131/66    


 


8/12/19 15:00 98.6 72 16 131/66 (87) 97 Room Air  





 98.6       


 


8/12/19 11:12   14  95 Room Air  


 


8/12/19 11:00 98.4 76 16 146/66 (92) 95 Room Air  





 98.4       


 


8/12/19 10:21  79 16  94 Room Air  


 


8/12/19 10:13  76 14  95   


 


8/12/19 10:08  79 14  95   


 


8/12/19 10:03  82 16  94   


 


8/12/19 09:58  85 16  97 Room Air  


 


8/12/19 09:12  84  159/81    


 


8/12/19 09:12  84  159/81    


 


8/12/19 08:00      Room Air  


 


8/12/19 07:00 98.7 84 18 159/81 (107) 93 Room Air  





 98.7       











Laboratory


Laboratory





Laboratory Tests








Test


 8/13/19


10:08


 


White Blood Count


 3.8 x10^3/uL


(4.0-11.0)


 


Red Blood Count


 4.27 x10^6/uL


(3.50-5.40)


 


Hemoglobin


 12.0 g/dL


(12.0-15.5)


 


Hematocrit


 35.5 %


(36.0-47.0)


 


Mean Corpuscular Volume 83 fL () 


 


Mean Corpuscular Hemoglobin 28 pg (25-35) 


 


Mean Corpuscular Hemoglobin


Concent 34 g/dL


(31-37)


 


Red Cell Distribution Width


 16.3 %


(11.5-14.5)


 


Platelet Count


 248 x10^3/uL


(140-400)


 


Neutrophils (%) (Auto) 60 % (31-73) 


 


Lymphocytes (%) (Auto) 29 % (24-48) 


 


Monocytes (%) (Auto) 9 % (0-9) 


 


Eosinophils (%) (Auto) 1 % (0-3) 


 


Basophils (%) (Auto) 1 % (0-3) 


 


Neutrophils # (Auto)


 2.3 x10^3/uL


(1.8-7.7)


 


Lymphocytes # (Auto)


 1.1 x10^3/uL


(1.0-4.8)


 


Monocytes # (Auto)


 0.4 x10^3/uL


(0.0-1.1)


 


Eosinophils # (Auto)


 0.0 x10^3/uL


(0.0-0.7)


 


Basophils # (Auto)


 0.0 x10^3/uL


(0.0-0.2)


 


Sodium Level


 141 mmol/L


(136-145)


 


Potassium Level


 3.7 mmol/L


(3.5-5.1)


 


Chloride Level


 108 mmol/L


()


 


Carbon Dioxide Level


 24 mmol/L


(21-32)


 


Anion Gap 9 (6-14) 


 


Blood Urea Nitrogen


 11 mg/dL


(7-20)


 


Creatinine


 0.8 mg/dL


(0.6-1.0)


 


Estimated GFR


(Cockcroft-Gault) 70.9 





 


BUN/Creatinine Ratio 14 (6-20) 


 


Glucose Level


 103 mg/dL


(70-99)


 


Calcium Level


 8.4 mg/dL


(8.5-10.1)


 


Total Bilirubin


 0.4 mg/dL


(0.2-1.0)


 


Aspartate Amino Transf


(AST/SGOT) 85 U/L (15-37) 





 


Alanine Aminotransferase


(ALT/SGPT) 34 U/L (14-59) 





 


Alkaline Phosphatase


 74 U/L


()


 


Total Protein


 6.4 g/dL


(6.4-8.2)


 


Albumin


 2.5 g/dL


(3.4-5.0)


 


Albumin/Globulin Ratio 0.6 (1.0-1.7) 








Microbiology


8/9/19 Urine Culture - Final, Complete


         


8/9/19 Urine Culture Result 1 (SAMUEL) - Final, Complete





Medication


Medications





Current Medications


Albuterol Sulfate (Ventolin Neb Soln) 2.5 mg PRN Q4HRS  PRN NEB SHORTNESS OF 

BREATH Last administered on 8/13/19at 16:41;  Start 8/12/19 at 21:45


Pantoprazole Sodium (Protonix) 40 mg DAILYAC PO  Last administered on 8/13/19at 

09:53;  Start 8/13/19 at 07:30





Comment


Review of Relevant


I have reviewed the following items ignacio (where applicable) has been applied.











ANTONIA MCGILL MD                 Aug 13, 2019 20:26

## 2019-08-13 NOTE — RAD
8/13/2019 8:22 AM



Procedure: Ultrasound guided left pelvic mass biopsy



Indication: Newly discovered left hepatic mass.  Mesenteric mass.  Possible

gastric origin.  Possible GIST 



Consent:



The procedure was explained in its entirety to the patient or the patients

designated representative by a member of the treatment team, including a

discussion of the risks, benefits and commonly accepted alternatives to the

procedure, as well as the expected consequences of no therapy whatsoever.  

Discussion of the risks included, but was not limited to, those that are most

frequent and those that are rare but possibly severe or life-threatening, as

well as the possibility of unforeseen complications.



All elements of maximal sterile barrier technique including the use of a cap,

mask, sterile gown, sterile gloves, large sterile sheet, appropriate hand

hygiene, and 2% chlorhexidine for cutaneous antisepsis (or acceptable

alternative antiseptic per current guidelines) were followed for this

procedure.





The mid anterior abdomen was prepped and draped as described.  Ultrasound

evaluation demonstrates a mildly heterogenous mass occupying the majority of

the left hepatic lobe.  This was targeted for biopsy.  1% lidocaine was

administered for local anesthesia.  A 17-gauge needle was advanced into the

mass under direct ultrasound guidance.  Multiple 18-gauge core biopsy samples

were obtained.  Gelfoam embolization of the biopsy tract was performed as the

guiding needle was removed.  Manual pressure was held.  Repeat ultrasound

performed for several minutes demonstrating no significant hematoma or other

immediate complication.  The patient tolerated the procedure well.



 



Sedation The procedure was performed under conscious sedation,  including

continuous cardiopulmonary monitoring via a dedicated sedation nurse. Face to

face sedation time :  25 minutes



Impression:





Ultrasound-guided biopsy, left hepatic mass

## 2019-08-14 VITALS — SYSTOLIC BLOOD PRESSURE: 148 MMHG | DIASTOLIC BLOOD PRESSURE: 76 MMHG

## 2019-08-14 VITALS
DIASTOLIC BLOOD PRESSURE: 74 MMHG | DIASTOLIC BLOOD PRESSURE: 74 MMHG | SYSTOLIC BLOOD PRESSURE: 152 MMHG | SYSTOLIC BLOOD PRESSURE: 152 MMHG | DIASTOLIC BLOOD PRESSURE: 74 MMHG | SYSTOLIC BLOOD PRESSURE: 152 MMHG

## 2019-08-14 VITALS — DIASTOLIC BLOOD PRESSURE: 82 MMHG | SYSTOLIC BLOOD PRESSURE: 166 MMHG

## 2019-08-14 VITALS — SYSTOLIC BLOOD PRESSURE: 142 MMHG | DIASTOLIC BLOOD PRESSURE: 78 MMHG

## 2019-08-14 VITALS — SYSTOLIC BLOOD PRESSURE: 152 MMHG | DIASTOLIC BLOOD PRESSURE: 74 MMHG

## 2019-08-14 LAB — CEA SERPL-MCNC: 1.7 NG/ML (ref 0–4.7)

## 2019-08-14 RX ADMIN — CARVEDILOL SCH MG: 3.12 TABLET, FILM COATED ORAL at 18:17

## 2019-08-14 RX ADMIN — CITALOPRAM HYDROBROMIDE SCH MG: 20 TABLET ORAL at 08:43

## 2019-08-14 RX ADMIN — POTASSIUM CHLORIDE SCH MEQ: 1500 TABLET, EXTENDED RELEASE ORAL at 08:44

## 2019-08-14 RX ADMIN — PANTOPRAZOLE SODIUM SCH MG: 40 TABLET, DELAYED RELEASE ORAL at 08:43

## 2019-08-14 RX ADMIN — CARVEDILOL SCH MG: 3.12 TABLET, FILM COATED ORAL at 08:44

## 2019-08-14 RX ADMIN — MAGNESIUM HYDROXIDE PRN MG: 400 SUSPENSION ORAL at 08:43

## 2019-08-14 RX ADMIN — Medication SCH CAP: at 08:44

## 2019-08-14 RX ADMIN — ASPIRIN SCH MG: 325 TABLET, DELAYED RELEASE ORAL at 08:43

## 2019-08-14 RX ADMIN — ALBUTEROL SULFATE PRN MG: 108 AEROSOL, METERED RESPIRATORY (INHALATION) at 15:49

## 2019-08-14 NOTE — PDOC
G I PROGRESS NOTE


Subjective


No GI complaints.  Would love to go home.


Physical Exam


Lungs clear.


RRR


Abdomen soft, not tender nor distended.


Review of Relevant


I have reviewed the following items ignacio (where applicable) has been applied.


Labs





Laboratory Tests








Test


 8/13/19


10:08


 


White Blood Count


 3.8 x10^3/uL


(4.0-11.0)


 


Red Blood Count


 4.27 x10^6/uL


(3.50-5.40)


 


Hemoglobin


 12.0 g/dL


(12.0-15.5)


 


Hematocrit


 35.5 %


(36.0-47.0)


 


Mean Corpuscular Volume 83 fL () 


 


Mean Corpuscular Hemoglobin 28 pg (25-35) 


 


Mean Corpuscular Hemoglobin


Concent 34 g/dL


(31-37)


 


Red Cell Distribution Width


 16.3 %


(11.5-14.5)


 


Platelet Count


 248 x10^3/uL


(140-400)


 


Neutrophils (%) (Auto) 60 % (31-73) 


 


Lymphocytes (%) (Auto) 29 % (24-48) 


 


Monocytes (%) (Auto) 9 % (0-9) 


 


Eosinophils (%) (Auto) 1 % (0-3) 


 


Basophils (%) (Auto) 1 % (0-3) 


 


Neutrophils # (Auto)


 2.3 x10^3/uL


(1.8-7.7)


 


Lymphocytes # (Auto)


 1.1 x10^3/uL


(1.0-4.8)


 


Monocytes # (Auto)


 0.4 x10^3/uL


(0.0-1.1)


 


Eosinophils # (Auto)


 0.0 x10^3/uL


(0.0-0.7)


 


Basophils # (Auto)


 0.0 x10^3/uL


(0.0-0.2)


 


Sodium Level


 141 mmol/L


(136-145)


 


Potassium Level


 3.7 mmol/L


(3.5-5.1)


 


Chloride Level


 108 mmol/L


()


 


Carbon Dioxide Level


 24 mmol/L


(21-32)


 


Anion Gap 9 (6-14) 


 


Blood Urea Nitrogen


 11 mg/dL


(7-20)


 


Creatinine


 0.8 mg/dL


(0.6-1.0)


 


Estimated GFR


(Cockcroft-Gault) 70.9 





 


BUN/Creatinine Ratio 14 (6-20) 


 


Glucose Level


 103 mg/dL


(70-99)


 


Calcium Level


 8.4 mg/dL


(8.5-10.1)


 


Total Bilirubin


 0.4 mg/dL


(0.2-1.0)


 


Aspartate Amino Transf


(AST/SGOT) 85 U/L (15-37) 





 


Alanine Aminotransferase


(ALT/SGPT) 34 U/L (14-59) 





 


Alkaline Phosphatase


 74 U/L


()


 


Total Protein


 6.4 g/dL


(6.4-8.2)


 


Albumin


 2.5 g/dL


(3.4-5.0)


 


Albumin/Globulin Ratio 0.6 (1.0-1.7) 








Microbiology


8/9/19 Urine Culture - Final, Complete


         


8/9/19 Urine Culture Result 1 (SAMUEL) - Final, Complete


         


CEA normal.  Biopsy pending.


Vitals/I & O





Vital Sign - Last 24 Hours








 8/13/19 8/13/19 8/13/19 8/13/19





 11:00 15:30 16:42 17:21


 


Temp 98.4 98.0  





 98.4 98.0  


 


Pulse 78 71  71


 


Resp 18 20  


 


B/P (MAP) 131/79 (96) 140/71 (94)  140/71


 


Pulse Ox 95 95 99 


 


O2 Delivery Room Air Room Air Room Air 


 


    





    





 8/13/19 8/13/19 8/14/19 8/14/19





 19:02 23:59 03:15 07:00


 


Temp 97.8 98.8 98.5 98.3





 97.8 98.8 98.5 98.3


 


Pulse 73 74 77 71


 


Resp 19 16 16 18


 


B/P (MAP) 127/55 (79) 140/71 (94) 148/76 (100) 166/82 (110)


 


Pulse Ox 97 96 96 98


 


O2 Delivery Room Air Room Air Room Air Room Air


 


    





    





 8/14/19 8/14/19  





 08:44 08:44  


 


Pulse 71 71  


 


B/P (MAP) 166/82 166/82  














Intake and Output   


 


 8/13/19 8/13/19 8/14/19





 14:59 22:59 06:59


 


Intake Total 240 ml 120 ml 160 ml


 


Balance 240 ml 120 ml 160 ml








Problem List


Problems


Medical Problems:


(1) UTI (urinary tract infection)


Status: Acute  





Assessment


Hepatic lesion, HCC suspect.


Plan of Care:  Continue current Tx, Mgmt


Plan of Care Note


Await biopsy.











HARRIETT VALENCIA MD          Aug 14, 2019 10:33

## 2019-08-14 NOTE — SNU/HH DC
DISCHARGE WITH HOME HEALTH


DISCHARGE INFORMATION:


Final Diagnosis:


Problems


Medical Problems:


(1) UTI (urinary tract infection)


Status: Acute  








Condition on Discharge:  Guarded





CODE STATUS:


Code Status:  Full





HOME HEALTH:


Face to Face:


I certify this patient is under my care and that I, or a nurse practitioner or 

physician's assistant working with me, had a face to face encounter that meets 

the physician face to face encounter requirements with this patient on [].


Medical Complications:  CVA


RN For Eval/Treatment:  Yes


Physical Therapy For:  Evalulation/Treatment


Occupational Therapy For:  Evaluation/Treatment


Speech Language Pathology For:  Evaluation/Treatment


Home Health Aide For:  Self-care


MSW For:  [employer]


Pt Meets Homebound Status:  Unsteady balance w/ amb,





POST DISCHARGE ORDERS:


Activity Instructions for Disc:  Activity as tolerated


DIET AFTER DISCHARGE:  Cardiac





CHECKS AFTER DISCHARGE:


Checks after discharge:  Check blood press - daily





FOLLOW-UP:


Follow up with:  ERIK  1 WEEK


Follow Up With:  PCP ONE WEEK





TREATMENT/EQUIPMENT ORDERS:


Adaptive Equipment Issued:  Front wheeled walker





CERTIFICATION STATEMENT:


Certification Statement:


Certification Statement: Based on the above finding, I certify that this patient

 is confined to the home and needs intermittent skilled nursing care, physical 

therapy and/or speech therapy, or continues to need occupational therapy.~ This 

patient is under my care, and I have initiated the establishment of the plan of 

care.~ This patient will be followed by myself or a community physician who will

 periodically review the plan of care.


Home Meds


Active Scripts


Lactobacillus Rhamnosus Gg (CULTURELLE) 1 Each Cap.sprink, 1 CAP PO BID for 

SUPPLEMENT for 14 Days, #28 CAP


   Prov:PETER ATKINSON MD         8/14/19


Docusate Sodium (COLACE) 100 Mg Capsule, 100 MG PO PRN BID PRN for HARD STOOLS 

for 14 Days, #14 CAP


   Prov:PETER ATKINSON MD         8/14/19


Potassium Chloride (KLOR-CON M20) 20 Meq Tab.er.prt, 20 MEQ PO DAILYWBKFT for 

SUPPLEMENT for 10 Days, #10 TAB.SR


   Prov:PETER ATKINSON MD         8/14/19


Aspirin (ASPIRIN EC) 325 Mg Tablet.dr, 325 MG PO DAILYWBKFT for CVA for 30 Days,

 #30 TAB.SR


   Prov:PETER ATKINSON MD         8/14/19


Amlodipine Besylate (AMLODIPINE BESYLATE) 5 Mg Tablet, 5 MG PO DAILY for BLOOD 

PRESSURE for 30 Days, #30 TAB


   Prov:PETER ATKINSON MD         8/14/19


Reported Medications


Citalopram Hydrobromide (CITALOPRAM HBR) 20 Mg Tablet, 1 TAB PO DAILY for mood, 

#30 TAB 5 Refills


   8/10/19


[atrovent hfa]   No Conflict Check, 17 MCG QID


   3/28/17


Alprazolam (XANAX) 0.5 Mg Tablet, 0.5-1 TAB PO BID for Anxiety, TAB


   3/28/17


Carvedilol (COREG  **) 3.125 Mg Tablet, 1 TAB PO BID for blood pressure, TAB


   3/28/17


Esomeprazole Magnesium (NEXIUM CAPSULE) 40 Mg Capsule.dr, 40 MG PO DAILYAC for 

nexium, CAP 0 Refills


   3/28/17


Atorvastatin Calcium (ATORVASTATIN CALCIUM) 40 Mg Tablet, 40 MG PO HS for 

cholesterol, TAB 0 Refills


   3/28/17


Discontinued Reported Medications


Aspirin (ASPIR 81) 81 Mg Tablet., 81 MG PO DAILY, TAB


   3/28/17











PETER ATKINSON MD          Aug 14, 2019 12:46

## 2019-08-14 NOTE — NUR
SW following pt. Pt agreeable with [facility] and orders faxed. They will see pt 
in the next 48-72 hours. SIDNEY left [place] [facility] information for meal on wheels 
program. Discussed with RN.

## 2019-08-14 NOTE — PDOC
PROGRESS NOTES


Assessment


Assessment


Acute 5 mm left posterior frontal infract.


Slurred speech.


UTI.


HTN.


COPD.


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


Hepatocellular carcinoma.


Weight loss.


Elevated AFP.





RECOMMENDATIONS/PLAN:


Continue  mg daily. 


Continue Lipitor HS.


Consulted GI.


Treat medical diseases.





Past Medical History


Cardiovascular:  CAD


CENTRAL NERVOUS SYSTEM:  Peripheral neuropathy


GI:  Constipation, Hemorrhoids


Heme/Onc:  No pertinent hx


Hepatobiliary:  No pertinent hx


Rheumatologic:  Other (arthritis)


Renal/:  No pertinent hx


Dermatology:  Other (LUPUS)





Family History


Hypertension





Social History


Quit 20 yrs ago.


ALCOHOL:  none


Drugs:  None





Allergies


Coded Allergies:  


Clopidogrel (Verified  Allergy, Intermediate, Rash, 8/10/19)





MEDICATIONS:


Refer to MAR





REVIEW OF SYSTEMS:


Constitutional: Weight loss.


Head: No traumatic brain or head injury.


Skin: No edema, or rash.


Ear: No infection.


Eyes: No vision loss, or diplopia.


Nose: No bleeding or purulent discharges.


Hearing: Hearing loss.


Neck: No injury.


Breast: No history of cancer, masses, or discharges.


Cardiac: HTN, HLD


Pulmonary: No COPD.


GI: cancer?


Urinary/genital: UTI.


Endocrine: No cousin face, craniofacial dysmorphism.


Skeletomuscular: No muscular atrophy, deformity.


Neurological: see  HP.


Psychiatric: Denies drug use/abuse.


Otherwise, not pertinant14-point review of systems.





PHYSICAL EXAMINATION:   


General appearance in subacute distress.  


HEENT:  Normocephalic and nontraumatic.  Eyes, nose, ears, and throat are 

unremarkable. Hearing decrease.


Neck is supple. No lymphadenopathy. No bruits are heard over the carotid artery.

No Crepitus.


Cardiovascular:  S1, S2, regular rate and rhythm.  


Pulmonary:  Clear to auscultation bilaterally. 


Abdomen:  Bowel sounds are positive.  Abdomen is soft, nontender, and 

nondistended.   


Extremities:  No rash, lesions, or edema.  


No restriction of range of motion





NEUROLOGICAL  EXAMINATION:


Alert.


Partially oriented to time, place and person.


PERRL.


EOMI.


CN: no focal findings.


Muscle tone: within normal.


Muscle strength: 4+


DTR: 1-2


Plantar reflex: Flexor response bilaterally 


Gait: not examined in chair.


Sensory exam: no abnormal findings.


No cerebellar signs elicited.


F-T-N test fine.





Objective


Objective





Vital Signs








  Date Time  Temp Pulse Resp B/P (MAP) Pulse Ox O2 Delivery O2 Flow Rate FiO2


 


8/14/19 11:35 97.6 74 18 142/78 (99) 96 Room Air  





 97.6       














Intake and Output 


 


 8/14/19





 07:00


 


Intake Total 520 ml


 


Balance 520 ml


 


 


 


Intake Oral 520 ml


 


# Voids 1











Vitals Signs


Vitals





                          VS - Last 72 Hours, by Label








  Date Time  Temp Pulse Resp B/P (MAP) Pulse Ox O2 Delivery O2 Flow Rate FiO2


 


8/14/19 11:35 97.6 74 18 142/78 (99) 96 Room Air  





 97.6       


 


8/14/19 08:44  71  166/82    


 


8/14/19 08:44  71  166/82    


 


8/14/19 08:00      Room Air  


 


8/14/19 07:00 98.3 71 18 166/82 (110) 98 Room Air  





 98.3       


 


8/14/19 03:15 98.5 77 16 148/76 (100) 96 Room Air  





 98.5       


 


8/13/19 23:59 98.8 74 16 140/71 (94) 96 Room Air  





 98.8       


 


8/13/19 19:02 97.8 73 19 127/55 (79) 97 Room Air  





 97.8       


 


8/13/19 17:21  71  140/71    


 


8/13/19 16:42     99 Room Air  


 


8/13/19 15:30 98.0 71 20 140/71 (94) 95 Room Air  





 98.0       


 


8/13/19 11:00 98.4 78 18 131/79 (96) 95 Room Air  





 98.4       


 


8/13/19 09:53  83  136/76    


 


8/13/19 09:53  83  136/76    


 


8/13/19 08:00      Room Air  


 


8/13/19 07:30 99.2 83 18 136/76 (96) 95 Room Air  





 99.2       











Laboratory


Laboratory





Microbiology


8/9/19 Urine Culture - Final, Complete


         


8/9/19 Urine Culture Result 1 (SAMUEL) - Final, Complete





Comment


Review of Relevant


I have reviewed the following items ignacio (where applicable) has been applied.











ANTONIA MCGILL MD                 Aug 14, 2019 15:39

## 2019-08-14 NOTE — PDOC
PROGRESS NOTES


History of Present Illness


History of Present Illness





VTE Prophylaxis Ordered


VTE Prophylaxis Devices:  No


VTE Pharmacological Prophylaxi:  Yes





Assessment/Plan


Assessment/Plan











Impression:  





TIA (transient ischemic attack)


Acute 5 mm left posterior frontal infract.


Slurred speech.


No hemodynamically significant internal carotid artery stenosis is identified. 

ON CAROTID DOPPLER 8/10


Hypokalemia


Hypomagnesemia


UTI (urinary tract infection)


Remote ischemic changes are identified in the right middle and inferior frontal 

gyri.


Moderate aortic regurgitation.


Trace mitral regurgitation.


RECTAL BLEEDING


There is a heterogeneous mass lateral to the proximal stomach. The mass 


may be exophytic from the stomach. There is an identical appearing large 


lobular mass in the left hepatic lobe. The 2 masses nearly abut. Primary 


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 Other 


etiologies are not excluded.


Abnormal, unintentional weight loss 20# in 6 mo


DISCOID LUPUS


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 

HCC


 











PLAN





ADMIT


NEUROCHECKS Q 4 HRS


Neurology consult


TELE


REVIEWED MRI BRAIN


pt/ot/st


ASA


DOPPLER CAROTIDS


ECHO


DVT PROPHYLAXIS


GI CONSULT


ZAHRA


LIVER IR biopsy. 8/12


2-D echo showed normal LV systolic function with mild to moderate aortic 

insufficiency. Plan outpatient event monitor


 Await liver bx results. 


PT/OT 


Discharge Recommendation Comments 


* Home with home health when medically stable 

















36 min pt exam, chart review, > 50% of time spent with exam, chart review, pt 

care coordination





Vitals


Vitals





Vital Signs








  Date Time  Temp Pulse Resp B/P (MAP) Pulse Ox O2 Delivery O2 Flow Rate FiO2


 


8/14/19 08:44  71  166/82    


 


8/14/19 07:00 98.3  18  98 Room Air  





 98.3       











Physical Exam


General:  Alert, Oriented X3, Cooperative, No acute distress


Heart:  Regular rate, Normal S1, Normal S2, Other (GR 2/6 WIL )


Lungs:  Clear


Abdomen:  Normal bowel sounds


Extremities:  No clubbing, No cyanosis, No edema


Skin:  No breakdown





Labs


LABS





Laboratory Tests








Test


 8/13/19


10:08


 


White Blood Count


 3.8 x10^3/uL


(4.0-11.0)


 


Red Blood Count


 4.27 x10^6/uL


(3.50-5.40)


 


Hemoglobin


 12.0 g/dL


(12.0-15.5)


 


Hematocrit


 35.5 %


(36.0-47.0)


 


Mean Corpuscular Volume 83 fL () 


 


Mean Corpuscular Hemoglobin 28 pg (25-35) 


 


Mean Corpuscular Hemoglobin


Concent 34 g/dL


(31-37)


 


Red Cell Distribution Width


 16.3 %


(11.5-14.5)


 


Platelet Count


 248 x10^3/uL


(140-400)


 


Neutrophils (%) (Auto) 60 % (31-73) 


 


Lymphocytes (%) (Auto) 29 % (24-48) 


 


Monocytes (%) (Auto) 9 % (0-9) 


 


Eosinophils (%) (Auto) 1 % (0-3) 


 


Basophils (%) (Auto) 1 % (0-3) 


 


Neutrophils # (Auto)


 2.3 x10^3/uL


(1.8-7.7)


 


Lymphocytes # (Auto)


 1.1 x10^3/uL


(1.0-4.8)


 


Monocytes # (Auto)


 0.4 x10^3/uL


(0.0-1.1)


 


Eosinophils # (Auto)


 0.0 x10^3/uL


(0.0-0.7)


 


Basophils # (Auto)


 0.0 x10^3/uL


(0.0-0.2)


 


Sodium Level


 141 mmol/L


(136-145)


 


Potassium Level


 3.7 mmol/L


(3.5-5.1)


 


Chloride Level


 108 mmol/L


()


 


Carbon Dioxide Level


 24 mmol/L


(21-32)


 


Anion Gap 9 (6-14) 


 


Blood Urea Nitrogen


 11 mg/dL


(7-20)


 


Creatinine


 0.8 mg/dL


(0.6-1.0)


 


Estimated GFR


(Cockcroft-Gault) 70.9 





 


BUN/Creatinine Ratio 14 (6-20) 


 


Glucose Level


 103 mg/dL


(70-99)


 


Calcium Level


 8.4 mg/dL


(8.5-10.1)


 


Total Bilirubin


 0.4 mg/dL


(0.2-1.0)


 


Aspartate Amino Transf


(AST/SGOT) 85 U/L (15-37) 





 


Alanine Aminotransferase


(ALT/SGPT) 34 U/L (14-59) 





 


Alkaline Phosphatase


 74 U/L


()


 


Total Protein


 6.4 g/dL


(6.4-8.2)


 


Albumin


 2.5 g/dL


(3.4-5.0)


 


Albumin/Globulin Ratio 0.6 (1.0-1.7) 











Assessment and Plan


Assessmemt and Plan


Problems


Medical Problems:


(1) UTI (urinary tract infection)


Status: Acute  





Follows Direction 


* Simple


Behavior 


* Cooperative


Safety/Judgement 


* Decreased Safety


* Due to Medical Status


Communication 


* pt verbalises needs.


Other Information - Objective Measures 


* Pt supine in bed HOB elevated.


Vision/Perception 


* Baseline


Activity Tolerance/ Vitals 


* No signs or symptoms of distress noted with activity. All vital signs


   stable.


   /62, HR 78


Sitting Balance 


* 4+ Moves 1-2" all planes


Standing Balance 


* 3+ balances w/o UEs >30s.


Objective Measures Comment 


* Pt agreeable to OT eval, pt Ind don socks EOB and Ind bed mob. Pt CGA 


   mobility in hallway to kitchenette, no LOB high low simulation. Pt 


   returned to  seated in recliner, SBA grooming in chair, bathing sit/std 


   and Min A don/doff gown. Pt required asst to open soda can, pt states she 


   has difficulty with some FM from her Lupus.


Pain Score 


* 0


Pain Comments 


* Pt states no pain.


Learning Preferences


* One-on-One Instruction


Discharge Recommendations 


* Home with Assistance


* Home with Home Health


Discharge Recommendation - DME 


* None


Discharge Recommendation Comments 


* Home with home health when medically stable





Comment


Review of Relevant


I have reviewed the following items ignacio (where applicable) has been applied.


Labs





Laboratory Tests








Test


 8/13/19


10:08


 


White Blood Count


 3.8 x10^3/uL


(4.0-11.0)


 


Red Blood Count


 4.27 x10^6/uL


(3.50-5.40)


 


Hemoglobin


 12.0 g/dL


(12.0-15.5)


 


Hematocrit


 35.5 %


(36.0-47.0)


 


Mean Corpuscular Volume 83 fL () 


 


Mean Corpuscular Hemoglobin 28 pg (25-35) 


 


Mean Corpuscular Hemoglobin


Concent 34 g/dL


(31-37)


 


Red Cell Distribution Width


 16.3 %


(11.5-14.5)


 


Platelet Count


 248 x10^3/uL


(140-400)


 


Neutrophils (%) (Auto) 60 % (31-73) 


 


Lymphocytes (%) (Auto) 29 % (24-48) 


 


Monocytes (%) (Auto) 9 % (0-9) 


 


Eosinophils (%) (Auto) 1 % (0-3) 


 


Basophils (%) (Auto) 1 % (0-3) 


 


Neutrophils # (Auto)


 2.3 x10^3/uL


(1.8-7.7)


 


Lymphocytes # (Auto)


 1.1 x10^3/uL


(1.0-4.8)


 


Monocytes # (Auto)


 0.4 x10^3/uL


(0.0-1.1)


 


Eosinophils # (Auto)


 0.0 x10^3/uL


(0.0-0.7)


 


Basophils # (Auto)


 0.0 x10^3/uL


(0.0-0.2)


 


Sodium Level


 141 mmol/L


(136-145)


 


Potassium Level


 3.7 mmol/L


(3.5-5.1)


 


Chloride Level


 108 mmol/L


()


 


Carbon Dioxide Level


 24 mmol/L


(21-32)


 


Anion Gap 9 (6-14) 


 


Blood Urea Nitrogen


 11 mg/dL


(7-20)


 


Creatinine


 0.8 mg/dL


(0.6-1.0)


 


Estimated GFR


(Cockcroft-Gault) 70.9 





 


BUN/Creatinine Ratio 14 (6-20) 


 


Glucose Level


 103 mg/dL


(70-99)


 


Calcium Level


 8.4 mg/dL


(8.5-10.1)


 


Total Bilirubin


 0.4 mg/dL


(0.2-1.0)


 


Aspartate Amino Transf


(AST/SGOT) 85 U/L (15-37) 





 


Alanine Aminotransferase


(ALT/SGPT) 34 U/L (14-59) 





 


Alkaline Phosphatase


 74 U/L


()


 


Total Protein


 6.4 g/dL


(6.4-8.2)


 


Albumin


 2.5 g/dL


(3.4-5.0)


 


Albumin/Globulin Ratio 0.6 (1.0-1.7) 








Laboratory Tests








Test


 8/13/19


10:08


 


White Blood Count


 3.8 x10^3/uL


(4.0-11.0)


 


Red Blood Count


 4.27 x10^6/uL


(3.50-5.40)


 


Hemoglobin


 12.0 g/dL


(12.0-15.5)


 


Hematocrit


 35.5 %


(36.0-47.0)


 


Mean Corpuscular Volume 83 fL () 


 


Mean Corpuscular Hemoglobin 28 pg (25-35) 


 


Mean Corpuscular Hemoglobin


Concent 34 g/dL


(31-37)


 


Red Cell Distribution Width


 16.3 %


(11.5-14.5)


 


Platelet Count


 248 x10^3/uL


(140-400)


 


Neutrophils (%) (Auto) 60 % (31-73) 


 


Lymphocytes (%) (Auto) 29 % (24-48) 


 


Monocytes (%) (Auto) 9 % (0-9) 


 


Eosinophils (%) (Auto) 1 % (0-3) 


 


Basophils (%) (Auto) 1 % (0-3) 


 


Neutrophils # (Auto)


 2.3 x10^3/uL


(1.8-7.7)


 


Lymphocytes # (Auto)


 1.1 x10^3/uL


(1.0-4.8)


 


Monocytes # (Auto)


 0.4 x10^3/uL


(0.0-1.1)


 


Eosinophils # (Auto)


 0.0 x10^3/uL


(0.0-0.7)


 


Basophils # (Auto)


 0.0 x10^3/uL


(0.0-0.2)


 


Sodium Level


 141 mmol/L


(136-145)


 


Potassium Level


 3.7 mmol/L


(3.5-5.1)


 


Chloride Level


 108 mmol/L


()


 


Carbon Dioxide Level


 24 mmol/L


(21-32)


 


Anion Gap 9 (6-14) 


 


Blood Urea Nitrogen


 11 mg/dL


(7-20)


 


Creatinine


 0.8 mg/dL


(0.6-1.0)


 


Estimated GFR


(Cockcroft-Gault) 70.9 





 


BUN/Creatinine Ratio 14 (6-20) 


 


Glucose Level


 103 mg/dL


(70-99)


 


Calcium Level


 8.4 mg/dL


(8.5-10.1)


 


Total Bilirubin


 0.4 mg/dL


(0.2-1.0)


 


Aspartate Amino Transf


(AST/SGOT) 85 U/L (15-37) 





 


Alanine Aminotransferase


(ALT/SGPT) 34 U/L (14-59) 





 


Alkaline Phosphatase


 74 U/L


()


 


Total Protein


 6.4 g/dL


(6.4-8.2)


 


Albumin


 2.5 g/dL


(3.4-5.0)


 


Albumin/Globulin Ratio 0.6 (1.0-1.7) 








Microbiology


8/9/19 Urine Culture - Final, Complete


         


8/9/19 Urine Culture Result 1 (SAMUEL) - Final, Complete


Medications





Current Medications


Sodium Chloride 1,000 ml @  1,000 mls/hr 1X  ONCE IV  Last administered on 

8/9/19at 20:33;  Start 8/9/19 at 20:00;  Stop 8/9/19 at 20:59;  Status DC


Aspirin (Children'S Aspirin) 162 mg 1X  ONCE PO  Last administered on 8/9/19at 

22:05;  Start 8/9/19 at 21:45;  Stop 8/9/19 at 21:46;  Status DC


Potassium Chloride (Klor-Con) 40 meq 1X  ONCE PO  Last administered on 8/9/19at 

22:05;  Start 8/9/19 at 21:45;  Stop 8/9/19 at 21:46;  Status DC


Magnesium Sulfate 50 ml @ 25 mls/hr 1X  ONCE IV  Last administered on 8/9/19at 

22:05;  Start 8/9/19 at 21:30;  Stop 8/9/19 at 23:29;  Status DC


Ceftriaxone Sodium (Rocephin) 1 gm 1X  ONCE IVP  Last administered on 8/9/19at 

22:05;  Start 8/9/19 at 22:00;  Stop 8/9/19 at 22:01;  Status DC


Ondansetron HCl (Zofran) 4 mg PRN Q8HRS  PRN IV NAUSEA/VOMITING;  Start 8/9/19 

at 22:00;  Stop 8/10/19 at 21:59;  Status DC


Alprazolam (Xanax) 0.25 mg BID PO ;  Start 8/10/19 at 00:00;  Stop 8/10/19 at 

00:05;  Status DC


Carvedilol (Coreg) 3.125 mg BIDWMEALS PO  Last administered on 8/14/19at 08:44; 

Start 8/10/19 at 00:00


Alprazolam (Xanax) 0.25 mg PRN BID  PRN PO ANXIETY / AGITATION Last administered

on 8/12/19at 04:52;  Start 8/10/19 at 00:15


Alprazolam (Xanax) 0.25 mg BID PO  Last administered on 8/14/19at 08:44;  Start 

8/10/19 at 00:05


Aspirin (Ecotrin) 81 mg DAILY PO  Last administered on 8/10/19at 12:06;  Start 

8/10/19 at 11:30;  Stop 8/10/19 at 14:14;  Status DC


Atorvastatin Calcium (Lipitor) 40 mg HS PO ;  Start 8/10/19 at 21:00;  Stop 

8/10/19 at 15:28;  Status DC


Citalopram Hydrobromide (CeleXA) 20 mg DAILY PO  Last administered on 8/14/19at 

08:44;  Start 8/10/19 at 12:00


Pantoprazole Sodium (Protonix) 40 mg DAILYAC PO  Last administered on 8/10/19at 

12:06;  Start 8/10/19 at 11:30;  Stop 8/11/19 at 12:36;  Status DC


Aspirin (Ecotrin) 325 mg DAILYWBKFT PO  Last administered on 8/14/19at 08:44;  

Start 8/10/19 at 15:00


Atorvastatin Calcium (Lipitor) 40 mg QHS PO  Last administered on 8/13/19at 

21:24;  Start 8/10/19 at 21:00


Sodium Chloride (Normal Saline Flush) 3 ml QSHIFT  PRN IV AFTER MEDS AND BLOOD 

DRAWS;  Start 8/10/19 at 14:15


Ondansetron HCl (Zofran) 4 mg PRN Q4HRS  PRN IV NAUSEA/VOMITING;  Start 8/10/19 

at 14:15


Acetaminophen (Tylenol) 650 mg PRN Q4HRS  PRN PO TEMP OVER 100.4F OR MILD PAIN; 

Start 8/10/19 at 14:15


Al Hydroxide/Mg Hydroxide (Mylanta Plus Xs) 30 ml PRN DAILY  PRN PO HEARTBURN / 

GAS;  Start 8/10/19 at 14:15


Clonidine HCl (Catapres) 0.1 mg PRN Q6HRS  PRN PO SBP>160 OR DBP>90 Last 

administered on 8/13/19at 01:04;  Start 8/10/19 at 14:15


Docusate Sodium (Colace) 100 mg PRN BID  PRN PO HARD STOOLS;  Start 8/10/19 at 

14:15


Albuterol/ Ipratropium (Duoneb) 3 ml Q4H NEB  Last administered on 8/12/19at 

20:06;  Start 8/10/19 at 14:15;  Stop 8/12/19 at 21:37;  Status DC


Guaifenesin (Robitussin) 200 mg PRN Q4HRS  PRN PO COUGH;  Start 8/10/19 at 14:15


Ceftriaxone Sodium (Rocephin) 1 gm Q24H IVP  Last administered on 8/13/19at 

21:24;  Start 8/10/19 at 22:00


Potassium Chloride (Klor-Con) 40 meq 1X  ONCE PO  Last administered on 8/10/19at

16:27;  Start 8/10/19 at 17:00;  Stop 8/10/19 at 17:01;  Status DC


Iohexol (Omnipaque 240 Mg/ml) 50 ml 1X  ONCE PO ;  Start 8/10/19 at 16:00;  Stop

8/10/19 at 16:02;  Status DC


Iohexol (Omnipaque 300 Mg/ml) 100 ml 1X  ONCE IV ;  Start 8/10/19 at 16:00;  

Stop 8/10/19 at 16:02;  Status DC


Hydralazine HCl (Apresoline Inj) 10 mg 1X  ONCE IVP  Last administered on 

8/11/19at 05:44;  Start 8/11/19 at 05:30;  Stop 8/11/19 at 05:31;  Status DC


Non-Formulary Medication 1 ea DAILY07 PO  Last administered on 8/14/19at 08:44; 

Start 8/11/19 at 16:30


Lactobacillus Rhamnosus (Culturelle) 1 cap BID PO  Last administered on 

8/14/19at 08:44;  Start 8/11/19 at 21:00


Potassium Chloride (Klor-Con) 40 meq 1X  ONCE PO  Last administered on 8/11/19at

16:45;  Start 8/11/19 at 15:00;  Stop 8/11/19 at 15:01;  Status DC


Potassium Chloride (Klor-Con) 20 meq DAILYWBKFT PO  Last administered on 

8/14/19at 08:44;  Start 8/12/19 at 08:00


Bisacodyl (Dulcolax Supp) 10 mg PRN DAILY  PRN NE CONSTIPATION;  Start 8/11/19 

at 20:30


Magnesium Hydroxide (Milk Of Magnesia) 2,400 mg PRN DAILY  PRN PO CONSTIPATION 

Last administered on 8/14/19at 08:44;  Start 8/11/19 at 20:30


Hydralazine HCl (Apresoline Inj) 10 mg PRN Q4HRS  PRN IVP ELEVATED BP, SEE 

COMMENTS Last administered on 8/12/19at 03:44;  Start 8/12/19 at 03:45


Amlodipine Besylate (Norvasc) 5 mg DAILY PO  Last administered on 8/14/19at 

08:44;  Start 8/12/19 at 03:45


Lidocaine/Sodium Bicarbonate (Buffered Lidocaine 1%) 3 ml STK-MED ONCE .ROUTE ; 

Start 8/12/19 at 09:06;  Stop 8/12/19 at 09:06;  Status DC


Midazolam HCl (Versed) 2 mg STK-MED ONCE .ROUTE ;  Start 8/12/19 at 09:36;  Stop

8/12/19 at 09:37;  Status DC


Fentanyl Citrate (Fentanyl 2ml Vial) 100 mcg STK-MED ONCE .ROUTE ;  Start 

8/12/19 at 09:37;  Stop 8/12/19 at 09:37;  Status DC


Lidocaine/Sodium Bicarbonate (Buffered Lidocaine 1%) 9 ml 1X  ONCE IJ  Last 

administered on 8/12/19at 10:20;  Start 8/12/19 at 10:15;  Stop 8/12/19 at 

10:16;  Status DC


Midazolam HCl (Versed) 2 mg 1X  ONCE IV  Last administered on 8/12/19at 11:12;  

Start 8/12/19 at 10:15;  Stop 8/12/19 at 10:16;  Status DC


Fentanyl Citrate (Fentanyl 2ml Vial) 100 mcg 1X  ONCE IV  Last administered on 

8/12/19at 11:12;  Start 8/12/19 at 10:15;  Stop 8/12/19 at 10:16;  Status DC


Pantoprazole Sodium (Protonix) 40 mg DAILYAC PO  Last administered on 8/14/19at 

08:44;  Start 8/13/19 at 07:30


Potassium Chloride (Klor-Con) 40 meq 1X  ONCE PO  Last administered on 8/12/19at

17:15;  Start 8/12/19 at 14:00;  Stop 8/12/19 at 14:01;  Status DC


Albuterol Sulfate (Ventolin Neb Soln) 2.5 mg PRN Q4HRS  PRN NEB SHORTNESS OF 

BREATH Last administered on 8/13/19at 16:41;  Start 8/12/19 at 21:45





Active Scripts


Active


Reported


Citalopram Hbr (Citalopram Hydrobromide) 20 Mg Tablet 1 Tab PO DAILY


[atrovent hfa]   17 Mcg  QID


Xanax (Alprazolam) 0.5 Mg Tablet 0.5-1 Tab PO BID


Coreg  ** (Carvedilol) 3.125 Mg Tablet 1 Tab PO BID


Nexium Capsule (Esomeprazole Magnesium) 40 Mg Capsule. 40 Mg PO DAILYAC


Atorvastatin Calcium 40 Mg Tablet 40 Mg PO HS


Aspir 81 (Aspirin) 81 Mg Tablet.dr 81 Mg PO DAILY


Vitals/I & O





Vital Sign - Last 24 Hours








 8/13/19 8/13/19 8/13/19 8/13/19





 09:53 09:53 11:00 15:30


 


Temp   98.4 98.0





   98.4 98.0


 


Pulse 83 83 78 71


 


Resp   18 20


 


B/P (MAP) 136/76 136/76 131/79 (96) 140/71 (94)


 


Pulse Ox   95 95


 


O2 Delivery   Room Air Room Air


 


    





    





 8/13/19 8/13/19 8/13/19 8/13/19





 16:42 17:21 19:02 23:59


 


Temp   97.8 98.8





   97.8 98.8


 


Pulse  71 73 74


 


Resp   19 16


 


B/P (MAP)  140/71 127/55 (79) 140/71 (94)


 


Pulse Ox 99  97 96


 


O2 Delivery Room Air  Room Air Room Air


 


    





    





 8/14/19 8/14/19 8/14/19 8/14/19





 03:15 07:00 08:44 08:44


 


Temp 98.5 98.3  





 98.5 98.3  


 


Pulse 77 71 71 71


 


Resp 16 18  


 


B/P (MAP) 148/76 (100) 166/82 (110) 166/82 166/82


 


Pulse Ox 96 98  


 


O2 Delivery Room Air Room Air  














Intake and Output   


 


 8/13/19 8/13/19 8/14/19





 14:59 22:59 06:59


 


Intake Total 240 ml 120 ml 160 ml


 


Balance 240 ml 120 ml 160 ml

















PETER ATKINSON MD          Aug 14, 2019 09:45

## 2019-08-14 NOTE — PATHOLOGY
[facility] Accession Number: [id number]

.                                                                01

Material submitted:                                        .

liver - LIVER LESION BIOPSY

.                                                                01

Clinical history:                                          .

Liver lesion

.                                                                02

**********************************************************************

Diagnosis:

Liver biopsy, liver lesion:

- HEPATOCELLULAR CARCINOMA, MODERATELY DIFFERENTIATED. SEE COMMENT.

(JPM:pit/mml 08/13/2019)

[facility]/08/14/2019

**********************************************************************

.                                                                02

Comment:

Sections of the liver lesion biopsy show extensive replacement of liver

parenchyma by a malignant epithelial neoplasm.  The malignant cells have a

trabecular arrangement.  The trabeculae are surrounded by what appear to

be endothelial cells.  The malignant cells have ample amounts of

eosinophilic cytoplasm and possess enlarged, rounded to ovoid nuclei

containing macronucleoli.  There are mitotic figures present. A panel of

immunoperoxidase stains is obtained on block A1 and yields the following

results:

.

Cytokeratin YEIMY:  Tumor cells positive

Cytokeratin 7:  Tumor cells negative

Cytokeratin 20:  Tumor cells negative

Polyclonal CEA:  Tumor positive showing canalicular pattern of staining

Hepatocyte Specific Antigen:  Tumor cells focally positive

CD34:  Positive staining of endothelial cells surrounding trabeculae of

tumor cells

.

The morphologic and immunophenotypic findings are supportive of the

diagnosis of a moderately differentiated hepatocellular carcinoma.

.

The case is also examined by Dr. Kerley, who concurs with the

diagnosis.

.

(JPM:pit/mml 08/13/2019)

.

Special stains performed:  Cytokeratin YEIMY, CK7, CK20, polyclonal CEA,

CD34 and hepatocyte specific antigen.

.                                                                02

Electronically signed:                                     .

French Paulino MD, Pathologist

NPI- [id number]

.                                                                01

Gross description:                                         .

The specimen is received in formalin, labeled "Alejandra Killian, liver BX"

and consists of multiple small needle cores of tan tissue measuring 1.5 x

0.7 x 0.1 cm in aggregate which are entirely submitted in A1.

(SDY; 8/12/2019)

SYU/SYU

.                                                                02

Pathologist provided ICD-10:

C22.9

.                                                                02

CPT                                                        .

749995, [id number], [id number]

Specimen Comment: A courtesy copy of this report has been sent to

Specimen Comment: [phone], [phone], [phone], [phone].

Specimen Comment: Report sent to ,DR VALENCIA,DR BRITTON / DR ATKINSON

***Performed at:  01

   [facility]

   [address] [place] [address], [place]  [id number]

   MD Louis Solis MD Phone:  [phone]

***Performed at:  02

   [facility]

   [address], [place]  [id number]

   MD French Paulino MD Phone:  [phone]

## 2019-08-14 NOTE — PDOC2
PALLIATIVE CARE


Palliative Care Note


Palliative Care


Patient alert.  Met with Dr. Davey and daughter.


Dr. Davey reviewed medical condition and dx. cancer along with followup 

scans/labs needed.


Discussed Code Status;  patient will continue Full Code and full aggressive 

care. 





Home Health will follow patient when discharged.











RAN MASON                  Aug 14, 2019 17:00

## 2019-08-14 NOTE — NUR
Received consult for Dr Davey oncology. Notified both son and daughter that Dr Davey would 
visit pt this afternoon after clinic to discuss results. Family had requested to be present 
for any discussion of diagnosis. Family verbalized understanding and are in route to 
hospital.

## 2019-08-14 NOTE — PDOC3
Discharge Summary


Date of Admission:  Aug 10, 2019


Date of Discharge:  Aug 14, 2019


Follow-Up:  1-2 days, 3-5 days


Admitting Diagnosis comment:


DISCHARGE DX ===========


Assessment/Plan











Impression:  








Acute 5 mm left posterior frontal infract.


Slurred speech.


No hemodynamically significant internal carotid artery stenosis is identified. 

ON CAROTID DOPPLER 8/10


Hypokalemia REPLACED


Hypomagnesemia REPLACED


UTI (urinary tract infection)


Remote ischemic changes are identified in the right middle and inferior frontal 

gyri.


Moderate aortic regurgitation.


Trace mitral regurgitation.


RECTAL BLEEDING


There is a heterogeneous mass lateral to the proximal stomach. The mass 


may be exophytic from the stomach. There is an identical appearing large 


lobular mass in the left hepatic lobe. The 2 masses nearly abut. Primary 


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 Other 


etiologies are not excluded.


Abnormal, unintentional weight loss 20# in 6 mo


DISCOID LUPUS


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 

HCC


 











PLAN





ADMIT


NEUROCHECKS Q 4 HRS


Neurology consult


TELE


REVIEWED MRI BRAIN


pt/ot/st


ASA


DOPPLER CAROTIDS


ECHO


DVT PROPHYLAXIS


GI CONSULT SEE IN FOLLOW-UP NEXT WEEK 


[facility]


LIVER IR biopsy. 8/12


2-D echo showed normal LV systolic function with mild to moderate aortic 

insufficiency. Plan outpatient event monitor


 Await liver bx results. 


PT/OT 


Discharge Recommendation Comments 


* Home with home health when medically stable 

















36 min pt exam, chart review, > 50% of time spent with exam, chart review, pt 

care coordination





Vitals


Vitals





Vital Signs








  Date Time  Temp Pulse Resp B/P (MAP) Pulse Ox O2 Delivery O2 Flow Rate FiO2


 


8/14/19 08:44  71  166/82    


 


8/14/19 07:00 98.3  18  98 Room Air  





 98.3       











Physical Exam


General:  Alert, Oriented X3, Cooperative, No acute distress


Heart:  Regular rate, Normal S1, Normal S2, Other (GR 2/6 WIL )


Lungs:  Clear


Abdomen:  Normal bowel sounds


Extremities:  No clubbing, No cyanosis, No edema


Skin:  No breakdown


FINAL DIAGNOSIS


Problems


Medical Problems:


(1) UTI (urinary tract infection)


Status: Acute  








Brief Hospital Course


Ms. Killian  is a 69 old [sex] who presented with [ ]


Discharge Medications





Current Medications


Sodium Chloride 1,000 ml @  1,000 mls/hr 1X  ONCE IV  Last administered on 

8/9/19at 20:33;  Start 8/9/19 at 20:00;  Stop 8/9/19 at 20:59;  Status DC


Aspirin (Children'S Aspirin) 162 mg 1X  ONCE PO  Last administered on 8/9/19at 

22:05;  Start 8/9/19 at 21:45;  Stop 8/9/19 at 21:46;  Status DC


Potassium Chloride (Klor-Con) 40 meq 1X  ONCE PO  Last administered on 8/9/19at 

22:05;  Start 8/9/19 at 21:45;  Stop 8/9/19 at 21:46;  Status DC


Magnesium Sulfate 50 ml @ 25 mls/hr 1X  ONCE IV  Last administered on 8/9/19at 

22:05;  Start 8/9/19 at 21:30;  Stop 8/9/19 at 23:29;  Status DC


Ceftriaxone Sodium (Rocephin) 1 gm 1X  ONCE IVP  Last administered on 8/9/19at 

22:05;  Start 8/9/19 at 22:00;  Stop 8/9/19 at 22:01;  Status DC


Ondansetron HCl (Zofran) 4 mg PRN Q8HRS  PRN IV NAUSEA/VOMITING;  Start 8/9/19 

at 22:00;  Stop 8/10/19 at 21:59;  Status DC


Alprazolam (Xanax) 0.25 mg BID PO ;  Start 8/10/19 at 00:00;  Stop 8/10/19 at 

00:05;  Status DC


Carvedilol (Coreg) 3.125 mg BIDWMEALS PO  Last administered on 8/14/19at 08:44; 

Start 8/10/19 at 00:00


Alprazolam (Xanax) 0.25 mg PRN BID  PRN PO ANXIETY / AGITATION Last administered

on 8/12/19at 04:52;  Start 8/10/19 at 00:15


Alprazolam (Xanax) 0.25 mg BID PO  Last administered on 8/14/19at 08:44;  Start 

8/10/19 at 00:05


Aspirin (Ecotrin) 81 mg DAILY PO  Last administered on 8/10/19at 12:06;  Start 

8/10/19 at 11:30;  Stop 8/10/19 at 14:14;  Status DC


Atorvastatin Calcium (Lipitor) 40 mg HS PO ;  Start 8/10/19 at 21:00;  Stop 

8/10/19 at 15:28;  Status DC


Citalopram Hydrobromide (CeleXA) 20 mg DAILY PO  Last administered on 8/14/19at 

08:44;  Start 8/10/19 at 12:00


Pantoprazole Sodium (Protonix) 40 mg DAILYAC PO  Last administered on 8/10/19at 

12:06;  Start 8/10/19 at 11:30;  Stop 8/11/19 at 12:36;  Status DC


Aspirin (Ecotrin) 325 mg DAILYWBKFT PO  Last administered on 8/14/19at 08:44;  

Start 8/10/19 at 15:00


Atorvastatin Calcium (Lipitor) 40 mg QHS PO  Last administered on 8/13/19at 

21:24;  Start 8/10/19 at 21:00


Sodium Chloride (Normal Saline Flush) 3 ml QSHIFT  PRN IV AFTER MEDS AND BLOOD 

DRAWS;  Start 8/10/19 at 14:15


Ondansetron HCl (Zofran) 4 mg PRN Q4HRS  PRN IV NAUSEA/VOMITING;  Start 8/10/19 

at 14:15


Acetaminophen (Tylenol) 650 mg PRN Q4HRS  PRN PO TEMP OVER 100.4F OR MILD PAIN; 

Start 8/10/19 at 14:15


Al Hydroxide/Mg Hydroxide (Mylanta Plus Xs) 30 ml PRN DAILY  PRN PO HEARTBURN / 

GAS;  Start 8/10/19 at 14:15


Clonidine HCl (Catapres) 0.1 mg PRN Q6HRS  PRN PO SBP>160 OR DBP>90 Last 

administered on 8/13/19at 01:04;  Start 8/10/19 at 14:15


Docusate Sodium (Colace) 100 mg PRN BID  PRN PO HARD STOOLS;  Start 8/10/19 at 

14:15


Albuterol/ Ipratropium (Duoneb) 3 ml Q4H NEB  Last administered on 8/12/19at 

20:06;  Start 8/10/19 at 14:15;  Stop 8/12/19 at 21:37;  Status DC


Guaifenesin (Robitussin) 200 mg PRN Q4HRS  PRN PO COUGH;  Start 8/10/19 at 14:15


Ceftriaxone Sodium (Rocephin) 1 gm Q24H IVP  Last administered on 8/13/19at 

21:24;  Start 8/10/19 at 22:00


Potassium Chloride (Klor-Con) 40 meq 1X  ONCE PO  Last administered on 8/10/19at

16:27;  Start 8/10/19 at 17:00;  Stop 8/10/19 at 17:01;  Status DC


Iohexol (Omnipaque 240 Mg/ml) 50 ml 1X  ONCE PO ;  Start 8/10/19 at 16:00;  Stop

8/10/19 at 16:02;  Status DC


Iohexol (Omnipaque 300 Mg/ml) 100 ml 1X  ONCE IV ;  Start 8/10/19 at 16:00;  

Stop 8/10/19 at 16:02;  Status DC


Hydralazine HCl (Apresoline Inj) 10 mg 1X  ONCE IVP  Last administered on 

8/11/19at 05:44;  Start 8/11/19 at 05:30;  Stop 8/11/19 at 05:31;  Status DC


Non-Formulary Medication 1 ea DAILY07 PO  Last administered on 8/14/19at 08:44; 

Start 8/11/19 at 16:30


Lactobacillus Rhamnosus (Culturelle) 1 cap BID PO  Last administered on 

8/14/19at 08:44;  Start 8/11/19 at 21:00


Potassium Chloride (Klor-Con) 40 meq 1X  ONCE PO  Last administered on 8/11/19at

16:45;  Start 8/11/19 at 15:00;  Stop 8/11/19 at 15:01;  Status DC


Potassium Chloride (Klor-Con) 20 meq DAILYWBKFT PO  Last administered on 

8/14/19at 08:44;  Start 8/12/19 at 08:00


Bisacodyl (Dulcolax Supp) 10 mg PRN DAILY  PRN AL CONSTIPATION;  Start 8/11/19 

at 20:30


Magnesium Hydroxide (Milk Of Magnesia) 2,400 mg PRN DAILY  PRN PO CONSTIPATION 

Last administered on 8/14/19at 08:44;  Start 8/11/19 at 20:30


Hydralazine HCl (Apresoline Inj) 10 mg PRN Q4HRS  PRN IVP ELEVATED BP, SEE 

COMMENTS Last administered on 8/12/19at 03:44;  Start 8/12/19 at 03:45


Amlodipine Besylate (Norvasc) 5 mg DAILY PO  Last administered on 8/14/19at 

08:44;  Start 8/12/19 at 03:45


Lidocaine/Sodium Bicarbonate (Buffered Lidocaine 1%) 3 ml STK-MED ONCE .ROUTE ; 

Start 8/12/19 at 09:06;  Stop 8/12/19 at 09:06;  Status DC


Midazolam HCl (Versed) 2 mg STK-MED ONCE .ROUTE ;  Start 8/12/19 at 09:36;  Stop

8/12/19 at 09:37;  Status DC


Fentanyl Citrate (Fentanyl 2ml Vial) 100 mcg STK-MED ONCE .ROUTE ;  Start 

8/12/19 at 09:37;  Stop 8/12/19 at 09:37;  Status DC


Lidocaine/Sodium Bicarbonate (Buffered Lidocaine 1%) 9 ml 1X  ONCE IJ  Last 

administered on 8/12/19at 10:20;  Start 8/12/19 at 10:15;  Stop 8/12/19 at 

10:16;  Status DC


Midazolam HCl (Versed) 2 mg 1X  ONCE IV  Last administered on 8/12/19at 11:12;  

Start 8/12/19 at 10:15;  Stop 8/12/19 at 10:16;  Status DC


Fentanyl Citrate (Fentanyl 2ml Vial) 100 mcg 1X  ONCE IV  Last administered on 

8/12/19at 11:12;  Start 8/12/19 at 10:15;  Stop 8/12/19 at 10:16;  Status DC


Pantoprazole Sodium (Protonix) 40 mg DAILYAC PO  Last administered on 8/14/19at 

08:44;  Start 8/13/19 at 07:30


Potassium Chloride (Klor-Con) 40 meq 1X  ONCE PO  Last administered on 8/12/19at

17:15;  Start 8/12/19 at 14:00;  Stop 8/12/19 at 14:01;  Status DC


Albuterol Sulfate (Ventolin Neb Soln) 2.5 mg PRN Q4HRS  PRN NEB SHORTNESS OF 

BREATH Last administered on 8/13/19at 16:41;  Start 8/12/19 at 21:45





Active Scripts


Active


Reported


Citalopram Hbr (Citalopram Hydrobromide) 20 Mg Tablet 1 Tab PO DAILY


[atrovent hfa]   17 Mcg  QID


Xanax (Alprazolam) 0.5 Mg Tablet 0.5-1 Tab PO BID


Coreg  ** (Carvedilol) 3.125 Mg Tablet 1 Tab PO BID


Nexium Capsule (Esomeprazole Magnesium) 40 Mg Capsule.dr 40 Mg PO DAILYAC


Atorvastatin Calcium 40 Mg Tablet 40 Mg PO HS


Aspir 81 (Aspirin) 81 Mg Tablet.dr 81 Mg PO DAILY


Vital Signs





Vital Signs








  Date Time  Temp Pulse Resp B/P (MAP) Pulse Ox O2 Delivery O2 Flow Rate FiO2


 


8/14/19 11:35 97.6 74 18 142/78 (99) 96 Room Air  





 97.6       








Labs





Laboratory Tests








Test


 8/13/19


10:08


 


White Blood Count


 3.8 x10^3/uL


(4.0-11.0)


 


Red Blood Count


 4.27 x10^6/uL


(3.50-5.40)


 


Hemoglobin


 12.0 g/dL


(12.0-15.5)


 


Hematocrit


 35.5 %


(36.0-47.0)


 


Mean Corpuscular Volume 83 fL () 


 


Mean Corpuscular Hemoglobin 28 pg (25-35) 


 


Mean Corpuscular Hemoglobin


Concent 34 g/dL


(31-37)


 


Red Cell Distribution Width


 16.3 %


(11.5-14.5)


 


Platelet Count


 248 x10^3/uL


(140-400)


 


Neutrophils (%) (Auto) 60 % (31-73) 


 


Lymphocytes (%) (Auto) 29 % (24-48) 


 


Monocytes (%) (Auto) 9 % (0-9) 


 


Eosinophils (%) (Auto) 1 % (0-3) 


 


Basophils (%) (Auto) 1 % (0-3) 


 


Neutrophils # (Auto)


 2.3 x10^3/uL


(1.8-7.7)


 


Lymphocytes # (Auto)


 1.1 x10^3/uL


(1.0-4.8)


 


Monocytes # (Auto)


 0.4 x10^3/uL


(0.0-1.1)


 


Eosinophils # (Auto)


 0.0 x10^3/uL


(0.0-0.7)


 


Basophils # (Auto)


 0.0 x10^3/uL


(0.0-0.2)


 


Sodium Level


 141 mmol/L


(136-145)


 


Potassium Level


 3.7 mmol/L


(3.5-5.1)


 


Chloride Level


 108 mmol/L


()


 


Carbon Dioxide Level


 24 mmol/L


(21-32)


 


Anion Gap 9 (6-14) 


 


Blood Urea Nitrogen


 11 mg/dL


(7-20)


 


Creatinine


 0.8 mg/dL


(0.6-1.0)


 


Estimated GFR


(Cockcroft-Gault) 70.9 





 


BUN/Creatinine Ratio 14 (6-20) 


 


Glucose Level


 103 mg/dL


(70-99)


 


Calcium Level


 8.4 mg/dL


(8.5-10.1)


 


Total Bilirubin


 0.4 mg/dL


(0.2-1.0)


 


Aspartate Amino Transf


(AST/SGOT) 85 U/L (15-37) 





 


Alanine Aminotransferase


(ALT/SGPT) 34 U/L (14-59) 





 


Alkaline Phosphatase


 74 U/L


()


 


Total Protein


 6.4 g/dL


(6.4-8.2)


 


Albumin


 2.5 g/dL


(3.4-5.0)


 


Albumin/Globulin Ratio 0.6 (1.0-1.7) 








Allergies





                                    Allergies








Coded Allergies Type Severity Reaction Last Updated Verified


 


  clopidogrel Allergy Intermediate Rash 8/10/19 Yes








Disposition/Orders:  D/C to Home w/ HH


Patient Instructions


D/C PLANNING 33 MIN











PETER ATKINSON MD          Aug 14, 2019 12:40

## 2019-08-23 ENCOUNTER — HOSPITAL ENCOUNTER (OUTPATIENT)
Dept: HOSPITAL 61 - NM | Age: 69
Discharge: HOME | End: 2019-08-23
Attending: INTERNAL MEDICINE
Payer: MEDICARE

## 2019-08-23 DIAGNOSIS — R19.01: ICD-10-CM

## 2019-08-23 DIAGNOSIS — R16.0: ICD-10-CM

## 2019-08-23 DIAGNOSIS — E04.1: ICD-10-CM

## 2019-08-23 DIAGNOSIS — I25.10: ICD-10-CM

## 2019-08-23 DIAGNOSIS — C22.0: Primary | ICD-10-CM

## 2019-08-23 PROCEDURE — A9503 TC99M MEDRONATE: HCPCS

## 2019-08-23 PROCEDURE — 71260 CT THORAX DX C+: CPT

## 2019-08-23 PROCEDURE — 78306 BONE IMAGING WHOLE BODY: CPT

## 2019-08-23 NOTE — RAD
PQRS Compliance statement:

 

One or more of the following individualized dose reduction techniques were

utilized for this examination:

1. Automated exposure control.

2. Adjustment of the mA and/or kV according to patient size.

3. Use of iterative reconstruction technique.

 

Indication:HCC. Staging exam.

 

TECHNIQUE: CT chest with IV contrast with multiplanar reformats.

 

COMPARISON:None

 

FINDINGS:

Heart is normal in size. No pericardial or pleural effusion. Coronary 

artery calcifications noted. Right thyroid nodule measuring 1.1 cm. No 

enlarged axillary, mediastinal or hilar adenopathy. Diffusely patulous 

esophagus. Lungs are clear. Large mass is seen in the left hepatic lobe 

approximately measuring 10.0 x 8.0 cm. Right upper quadrant mass is seen 

anterior to the stomach measuring 6.5 x 4.8 cm. This may be exophytic mass

from the liver. Visualized sections through the spleen, gallbladder, 

pancreas, adrenals and upper kidneys are within normal limits. No 

suspicious bony lesion.

 

IMPRESSION:

No evidence metastatic disease in the chest.

Left liver and left upper quadrant mass. Please see report on CT abdomen 

pelvis from 8/10/2019. Clinically correlate with biopsy.

 

Electronically signed by: Oli Holman DO (8/23/2019 9:08 AM) [facility]

## 2019-08-23 NOTE — RAD
EXAM: Nuclear bone scan.

 

HISTORY: HCC. 

 

COMPARISON: CT obtained on the same date.

 

TECHNIQUE: Following the intravenous injection of 25 mCi of Tc 99m labeled

methylene diphosphonate (MDP), whole body imaging was performed.  

 

FINDINGS: There is expected tracer activity within the renal collecting 

system. There are areas of increased activity involving both shoulders, 

elbows, first carpometacarpal joints and knees, likely degenerative in 

etiology. There is slight asymmetric radiotracer activity overlying the 

right facet joint at L4-L5, also likely degenerative. There is no 

convincing abnormal tracer activity to suggest osseous metastatic disease.

 

 

IMPRESSION:

1. No convincing osseous metastatic disease.

2. Suspected degenerative activity involving the axial and appendicular 

skeleton, described above.

 

Electronically signed by: Nena Partida MD (8/23/2019 12:32 PM) [facility]

## 2019-12-23 ENCOUNTER — HOSPITAL ENCOUNTER (OUTPATIENT)
Dept: HOSPITAL 61 - CT | Age: 69
Discharge: HOME | End: 2019-12-23
Attending: INTERNAL MEDICINE
Payer: MEDICARE

## 2019-12-23 DIAGNOSIS — R16.0: ICD-10-CM

## 2019-12-23 DIAGNOSIS — I25.10: ICD-10-CM

## 2019-12-23 DIAGNOSIS — J98.11: ICD-10-CM

## 2019-12-23 DIAGNOSIS — C22.0: Primary | ICD-10-CM

## 2019-12-23 DIAGNOSIS — I77.811: ICD-10-CM

## 2019-12-23 DIAGNOSIS — M85.88: ICD-10-CM

## 2019-12-23 DIAGNOSIS — I35.8: ICD-10-CM

## 2019-12-23 DIAGNOSIS — J98.4: ICD-10-CM

## 2019-12-23 PROCEDURE — 71260 CT THORAX DX C+: CPT

## 2019-12-23 PROCEDURE — 74177 CT ABD & PELVIS W/CONTRAST: CPT

## 2019-12-23 NOTE — RAD
EXAM: CT OF THE CHEST, ABDOMEN AND PELVIS WITHOUT CONTRAST.

 

HISTORY: Hepatocellular carcinoma.

 

TECHNIQUE: Computed tomography of the chest, abdomen and pelvis was 

performed without intravenous contrast. Contrast administration was 

attempted, but the IV extravasated. IV access could not be reestablished.

 

COMPARISON: 08/23/2019.

 

FINDINGS: Bone windows reveal no suspicious lesions. Osteopenia is 

moderate to severe.

 

There are no pathologically enlarged mediastinal or axillary lymph nodes. 

There is no pleural or pericardial effusion. The heart is not enlarged. 

There are atherosclerotic calcifications of the coronary arteries. Aortic 

valve calcifications are also noted. 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

 

There is mild focal scarring laterally in the left upper lobe. There is 

mild atelectasis dependently. No pulmonary metastases are identified.

 

The mass in the left hepatic lobe is less well-defined without contrast. 

It measures larger than on the prior study at 11.2 x 7.6 cm as compared 

with 9.4 x 8.1 cm on similar images. No new liver lesions are seen. There 

is no ascites. The spleen is not enlarged.

 

Embolization coils are noted within the gastroduodenal artery. The 

pancreas, adrenal glands, gallbladder and kidneys are unremarkable without

contrast. There are no pathologically enlarged lymph nodes.

 

Infrarenal abdominal aortic ectasia measures 2.7 cm. There is no 

aneurysmal dilatation. A right common iliac stent is in place. There is no

small bowel obstruction. The appendix is not inflamed.

 

IMPRESSION:

1. Limitations secondary to noncontrast technique. The IV extravasated 

without significant contrast delivery and IV access could not be 

reestablished.

2. The left hepatic lobe mass appears to have increased in size. No new 

lesions or evidence of metastatic disease. Postcontrast technique or MRI 

would be more sensitive.

 

*One or more of the following individualized dose reduction techniques 

were utilized for this examination:  

1. Automated exposure control.  

2. Adjustment of the mA and/or kV according to patient size.  

3. Use of iterative reconstruction technique.

 

Electronically signed by: ELENITA Rodriguez MD (12/23/2019 4:49 PM) 

[facility]

## 2020-01-26 ENCOUNTER — HOSPITAL ENCOUNTER (INPATIENT)
Dept: HOSPITAL 61 - ER | Age: 70
LOS: 5 days | Discharge: NURSING, REHAB OR LONG-TERM CARE | DRG: 640 | End: 2020-01-31
Attending: FAMILY MEDICINE | Admitting: FAMILY MEDICINE
Payer: MEDICARE

## 2020-01-26 VITALS — DIASTOLIC BLOOD PRESSURE: 67 MMHG | SYSTOLIC BLOOD PRESSURE: 145 MMHG

## 2020-01-26 VITALS — DIASTOLIC BLOOD PRESSURE: 54 MMHG | SYSTOLIC BLOOD PRESSURE: 118 MMHG

## 2020-01-26 VITALS — SYSTOLIC BLOOD PRESSURE: 156 MMHG | DIASTOLIC BLOOD PRESSURE: 77 MMHG

## 2020-01-26 VITALS — HEIGHT: 65 IN | WEIGHT: 120 LBS | BODY MASS INDEX: 19.99 KG/M2

## 2020-01-26 DIAGNOSIS — Z82.49: ICD-10-CM

## 2020-01-26 DIAGNOSIS — C22.0: ICD-10-CM

## 2020-01-26 DIAGNOSIS — I25.2: ICD-10-CM

## 2020-01-26 DIAGNOSIS — Z98.49: ICD-10-CM

## 2020-01-26 DIAGNOSIS — E87.6: Primary | ICD-10-CM

## 2020-01-26 DIAGNOSIS — K21.9: ICD-10-CM

## 2020-01-26 DIAGNOSIS — I25.10: ICD-10-CM

## 2020-01-26 DIAGNOSIS — K82.8: ICD-10-CM

## 2020-01-26 DIAGNOSIS — E86.0: ICD-10-CM

## 2020-01-26 DIAGNOSIS — M32.9: ICD-10-CM

## 2020-01-26 DIAGNOSIS — N20.0: ICD-10-CM

## 2020-01-26 DIAGNOSIS — F32.9: ICD-10-CM

## 2020-01-26 DIAGNOSIS — I73.9: ICD-10-CM

## 2020-01-26 DIAGNOSIS — F41.9: ICD-10-CM

## 2020-01-26 DIAGNOSIS — Z98.61: ICD-10-CM

## 2020-01-26 DIAGNOSIS — Z87.19: ICD-10-CM

## 2020-01-26 DIAGNOSIS — Z87.891: ICD-10-CM

## 2020-01-26 DIAGNOSIS — Z88.8: ICD-10-CM

## 2020-01-26 DIAGNOSIS — N17.0: ICD-10-CM

## 2020-01-26 DIAGNOSIS — E78.5: ICD-10-CM

## 2020-01-26 DIAGNOSIS — Z86.73: ICD-10-CM

## 2020-01-26 DIAGNOSIS — Z90.81: ICD-10-CM

## 2020-01-26 DIAGNOSIS — J44.9: ICD-10-CM

## 2020-01-26 DIAGNOSIS — Z80.1: ICD-10-CM

## 2020-01-26 DIAGNOSIS — M19.90: ICD-10-CM

## 2020-01-26 DIAGNOSIS — M81.0: ICD-10-CM

## 2020-01-26 DIAGNOSIS — R63.4: ICD-10-CM

## 2020-01-26 DIAGNOSIS — I10: ICD-10-CM

## 2020-01-26 DIAGNOSIS — D64.9: ICD-10-CM

## 2020-01-26 DIAGNOSIS — C79.9: ICD-10-CM

## 2020-01-26 DIAGNOSIS — K66.1: ICD-10-CM

## 2020-01-26 LAB
ALBUMIN SERPL-MCNC: 2.7 G/DL (ref 3.4–5)
ALBUMIN/GLOB SERPL: 0.8 {RATIO} (ref 1–1.7)
ALP SERPL-CCNC: 79 U/L (ref 46–116)
ALT SERPL-CCNC: 78 U/L (ref 14–59)
ANION GAP SERPL CALC-SCNC: 9 MMOL/L (ref 6–14)
APTT BLD: 26 SEC (ref 24–38)
AST SERPL-CCNC: 717 U/L (ref 15–37)
BASOPHILS # BLD AUTO: 0.1 X10^3/UL (ref 0–0.2)
BASOPHILS NFR BLD: 1 % (ref 0–3)
BILIRUB SERPL-MCNC: 0.5 MG/DL (ref 0.2–1)
BUN SERPL-MCNC: 23 MG/DL (ref 7–20)
BUN/CREAT SERPL: 21 (ref 6–20)
CALCIUM SERPL-MCNC: 8.3 MG/DL (ref 8.5–10.1)
CHLORIDE SERPL-SCNC: 103 MMOL/L (ref 98–107)
CK SERPL-CCNC: 48 U/L (ref 26–192)
CO2 SERPL-SCNC: 27 MMOL/L (ref 21–32)
CREAT SERPL-MCNC: 1.1 MG/DL (ref 0.6–1)
EOSINOPHIL NFR BLD: 0 X10^3/UL (ref 0–0.7)
EOSINOPHIL NFR BLD: 1 % (ref 0–3)
ERYTHROCYTE [DISTWIDTH] IN BLOOD BY AUTOMATED COUNT: 16.8 % (ref 11.5–14.5)
GFR SERPLBLD BASED ON 1.73 SQ M-ARVRAT: 49.1 ML/MIN
GLOBULIN SER-MCNC: 3.6 G/DL (ref 2.2–3.8)
GLUCOSE SERPL-MCNC: 115 MG/DL (ref 70–99)
HCT VFR BLD CALC: 26.9 % (ref 36–47)
HGB BLD-MCNC: 8.9 G/DL (ref 12–15.5)
LIPASE: 141 U/L (ref 73–393)
LYMPHOCYTES # BLD: 1.3 X10^3/UL (ref 1–4.8)
LYMPHOCYTES NFR BLD AUTO: 20 % (ref 24–48)
MCH RBC QN AUTO: 26 PG (ref 25–35)
MCHC RBC AUTO-ENTMCNC: 33 G/DL (ref 31–37)
MCV RBC AUTO: 79 FL (ref 79–100)
MONO #: 0.6 X10^3/UL (ref 0–1.1)
MONOCYTES NFR BLD: 9 % (ref 0–9)
NEUT #: 4.6 X10^3/UL (ref 1.8–7.7)
NEUTROPHILS NFR BLD AUTO: 70 % (ref 31–73)
PLATELET # BLD AUTO: 249 X10^3/UL (ref 140–400)
POTASSIUM SERPL-SCNC: 3 MMOL/L (ref 3.5–5.1)
PROT SERPL-MCNC: 6.3 G/DL (ref 6.4–8.2)
PROTHROMBIN TIME: 13.7 SEC (ref 11.7–14)
RBC # BLD AUTO: 3.39 X10^6/UL (ref 3.5–5.4)
SODIUM SERPL-SCNC: 139 MMOL/L (ref 136–145)
WBC # BLD AUTO: 6.5 X10^3/UL (ref 4–11)

## 2020-01-26 PROCEDURE — 85730 THROMBOPLASTIN TIME PARTIAL: CPT

## 2020-01-26 PROCEDURE — 81001 URINALYSIS AUTO W/SCOPE: CPT

## 2020-01-26 PROCEDURE — 87040 BLOOD CULTURE FOR BACTERIA: CPT

## 2020-01-26 PROCEDURE — 84484 ASSAY OF TROPONIN QUANT: CPT

## 2020-01-26 PROCEDURE — 83880 ASSAY OF NATRIURETIC PEPTIDE: CPT

## 2020-01-26 PROCEDURE — C9113 INJ PANTOPRAZOLE SODIUM, VIA: HCPCS

## 2020-01-26 PROCEDURE — 94640 AIRWAY INHALATION TREATMENT: CPT

## 2020-01-26 PROCEDURE — 96361 HYDRATE IV INFUSION ADD-ON: CPT

## 2020-01-26 PROCEDURE — 85610 PROTHROMBIN TIME: CPT

## 2020-01-26 PROCEDURE — 94760 N-INVAS EAR/PLS OXIMETRY 1: CPT

## 2020-01-26 PROCEDURE — 80053 COMPREHEN METABOLIC PANEL: CPT

## 2020-01-26 PROCEDURE — 85651 RBC SED RATE NONAUTOMATED: CPT

## 2020-01-26 PROCEDURE — 93005 ELECTROCARDIOGRAM TRACING: CPT

## 2020-01-26 PROCEDURE — 36415 COLL VENOUS BLD VENIPUNCTURE: CPT

## 2020-01-26 PROCEDURE — 83690 ASSAY OF LIPASE: CPT

## 2020-01-26 PROCEDURE — 87804 INFLUENZA ASSAY W/OPTIC: CPT

## 2020-01-26 PROCEDURE — 82728 ASSAY OF FERRITIN: CPT

## 2020-01-26 PROCEDURE — G0378 HOSPITAL OBSERVATION PER HR: HCPCS

## 2020-01-26 PROCEDURE — 84145 PROCALCITONIN (PCT): CPT

## 2020-01-26 PROCEDURE — 83540 ASSAY OF IRON: CPT

## 2020-01-26 PROCEDURE — 74176 CT ABD & PELVIS W/O CONTRAST: CPT

## 2020-01-26 PROCEDURE — A4314 CATH W/DRAINAGE 2-WAY LATEX: HCPCS

## 2020-01-26 PROCEDURE — 84132 ASSAY OF SERUM POTASSIUM: CPT

## 2020-01-26 PROCEDURE — 83735 ASSAY OF MAGNESIUM: CPT

## 2020-01-26 PROCEDURE — 83550 IRON BINDING TEST: CPT

## 2020-01-26 PROCEDURE — 85025 COMPLETE CBC W/AUTO DIFF WBC: CPT

## 2020-01-26 PROCEDURE — 96360 HYDRATION IV INFUSION INIT: CPT

## 2020-01-26 PROCEDURE — 82550 ASSAY OF CK (CPK): CPT

## 2020-01-26 PROCEDURE — 80048 BASIC METABOLIC PNL TOTAL CA: CPT

## 2020-01-26 RX ADMIN — Medication SCH CAP: at 21:00

## 2020-01-26 RX ADMIN — CITALOPRAM HYDROBROMIDE SCH MG: 20 TABLET ORAL at 16:00

## 2020-01-26 RX ADMIN — IPRATROPIUM BROMIDE AND ALBUTEROL SULFATE SCH ML: .5; 3 SOLUTION RESPIRATORY (INHALATION) at 20:24

## 2020-01-26 RX ADMIN — POTASSIUM CHLORIDE SCH MLS/HR: 7.46 INJECTION, SOLUTION INTRAVENOUS at 23:07

## 2020-01-26 RX ADMIN — CARVEDILOL SCH MG: 3.12 TABLET, FILM COATED ORAL at 16:44

## 2020-01-26 RX ADMIN — POTASSIUM CHLORIDE SCH MLS/HR: 7.46 INJECTION, SOLUTION INTRAVENOUS at 18:00

## 2020-01-26 RX ADMIN — POTASSIUM CHLORIDE SCH MLS/HR: 7.46 INJECTION, SOLUTION INTRAVENOUS at 15:39

## 2020-01-26 RX ADMIN — ENOXAPARIN SODIUM SCH MG: 40 INJECTION SUBCUTANEOUS at 20:52

## 2020-01-26 RX ADMIN — POTASSIUM CHLORIDE SCH MLS/HR: 7.46 INJECTION, SOLUTION INTRAVENOUS at 20:52

## 2020-01-26 RX ADMIN — ACETAMINOPHEN PRN MG: 650 SOLUTION ORAL at 20:52

## 2020-01-26 RX ADMIN — ACETAMINOPHEN PRN MG: 650 SOLUTION ORAL at 20:53

## 2020-01-26 RX ADMIN — BACITRACIN SCH MLS/HR: 5000 INJECTION, POWDER, FOR SOLUTION INTRAMUSCULAR at 14:40

## 2020-01-26 RX ADMIN — POTASSIUM CHLORIDE SCH MEQ: 1500 TABLET, EXTENDED RELEASE ORAL at 16:44

## 2020-01-26 NOTE — PDOC1
History and Physical


Date of Admission


Date of Admission


DATE: 1/26/20 


TIME: 13:45





Identification/Chief Complaint


Chief Complaint


 seen in er , 69  year old female with history of hypertension, coronary artery 

disease, anxiety, lupus, COPD, hepatocellular  carcinoma who presents with 

complaint of weakness and nausea and vomiting.





 Patient states she had 1 episode of vomiting and one episode of diarrhea 

yesterday morning at 4 AM and since then was not able to eat or drink and 

complaining of generalized abdominal pain as an aching pain. 





complaining of generalized weakness and not feeling well . Patient denies chest 

pain, shortness of breath   troponin i elevated





symptoms began friday afer eating





Past Medical History


Past Medical History


                                                            


 Past Medical History 


Past Medical History


Past Medical History:  Anxiety, COPD, GERD, Hypertension, MI


Additional Past Medical Histor:  external lupus


Past Surgical History:  Splenectomy, Tonsillectomy, Other


Additional Past Surgical Histo:  3 stents, liver repair, cataract removal


Alcohol Use:  None


Drug Use:  None


Cardiovascular:  CAD


CENTRAL NERVOUS SYSTEM:  Periperal neuropathy


GI:  Constipation, Hemorrhoids


Heme/Onc:  No pertinent hx


Hepatobiliary:  No pertinent hx, Other (hepatocellular carcinoma)


Rheumatologic:  Other


Renal/:  No pertinent hx





Family History


Family History:  Hypertension





Social History


Smoke:  Quit


ALCOHOL:  none


Drugs:  None





Current Medications


Current Medications





Current Medications


Sodium Chloride 1,000 ml @  1,000 mls/hr Q1H IV  Last administered on 1/26/20at 

12:51;  Start 1/26/20 at 12:51;  Stop 1/26/20 at 13:50


Ondansetron HCl (Zofran) 4 mg 1X  ONCE IV ;  Start 1/26/20 at 13:00;  Stop 

1/26/20 at 13:01;  Status DC





Active Scripts


Active


Culturelle (Lactobacillus Rhamnosus Gg) 1 Each Cap.sprink 1 Cap PO BID 14 Days


Colace (Docusate Sodium) 100 Mg Capsule 100 Mg PO PRN BID PRN 14 Days


Klor-Con M20 (Potassium Chloride) 20 Meq Tab.er.prt 20 Meq PO DAILYWBKFT 10 Days


Aspirin Ec (Aspirin) 325 Mg Tablet.dr 325 Mg PO DAILYWBKFT 30 Days


Amlodipine Besylate 5 Mg Tablet 5 Mg PO DAILY 30 Days


Reported


Citalopram Hbr (Citalopram Hydrobromide) 20 Mg Tablet 1 Tab PO DAILY


[atrovent hfa]   17 Mcg  QID


Xanax (Alprazolam) 0.5 Mg Tablet 0.5-1 Tab PO BID


Coreg  ** (Carvedilol) 3.125 Mg Tablet 1 Tab PO BID


Nexium Capsule (Esomeprazole Magnesium) 40 Mg Capsule.dr 40 Mg PO DAILYAC


Atorvastatin Calcium 40 Mg Tablet 40 Mg PO HS





Allergies


Allergies:  


Coded Allergies:  


     clopidogrel (Verified  Allergy, Intermediate, Rash, 8/10/19)





ROS


Review of System





Review of Systems


Review of Systems





Constitutional: Denies fever or chills []


Eyes: Denies change in visual acuity, redness, or eye pain []


HENT: Denies nasal congestion or sore throat []


Respiratory: Denies cough or shortness of breath []


Cardiovascular: No additional information not addressed in HPI []


GI: Reports abdominal pain, nausea, vomiting, diarrhea []


: Denies dysuria or hematuria []


Musculoskeletal: Denies back pain or joint pain []


Integument: Denies rash or skin lesions []


Neurologic: Denies headache, focal weakness or sensory changes []


Endocrine: Denies polyuria or polydipsia []





14 pt  systems were reviewed and found to be within normal limits, except as 

documented in this note.


General:  YES: Fatigue


ENDOCRINE:  No: Breast Changes, Galactorrhea, Hair Pattern Changes, Hot Flashes,

 Malaise/lethargy, Mood Swings, Palpitations, Polydipsia/polyuria, Skin Changes,

 Temperature Intolerance, Unexpected Weight Changes, Other


Cardiovascular:  No Chest Pain, No Palpitations, No Orthopnea, No Paroxysmal 

Noc. Dyspnea, No Edema, No Lt Headedness, No Other


Gastrointestinal:  Yes Nausea, Yes Vomiting, Yes Diarrhea


Musculoskeletal:  Yes Joint Stiffness





Physical Exam


Physical Exam





Physical Exam


Physical Exam








Constitutional: Well developed, thin and frail, mild distress, non-toxic 

appearance. []


HENT: Normocephalic, atraumatic, dry oral mucosa.


Eyes: PERRLA, EOMI, conjunctiva normal, no discharge. [] 


Neck: Normal range of motion, no tenderness, supple, no stridor. [] 


Cardiovascular:Heart rate regular rhythm, no murmur []


Lungs & Thorax:  Bilateral breath sounds clear to auscultation []


Abdomen: Bowel sounds normal, soft, no tenderness, no masses, no pulsatile 

masses. [] 


Skin: Warm, dry, no erythema, no rash. [] 


Back: No tenderness, no CVA tenderness. [] 


Extremities: No tenderness, no cyanosis, no clubbing, ROM intact, no edema. [] 


Neurologic: Alert and oriented X 3, no focal deficits noted. []


Psychologic: Affect normal, judgement normal, mood normal. []


General:  Alert, Oriented X3, Cooperative, mild distress


HEENT:  EOMI


Lungs:  Clear to auscultation, Normal air movement


Heart:  RRR


Abdomen:  Soft, No tenderness


Rectal Exam:  not examined


PELVIC:  Examination not indicated


Extremities:  No cyanosis


Neuro:  Normal speech, Cranial nerves 3-12 NL


Psych/Mental Status:  Mental status NL, Mood NL





Vitals


Vitals





Vital Signs








  Date Time  Temp Pulse Resp B/P (MAP) Pulse Ox O2 Delivery O2 Flow Rate FiO2


 


1/26/20 12:58 98.9 103 20 131/73 (92) 96 Room Air  





 98.9       











Labs


Labs





Laboratory Tests








Test


 1/26/20


13:00


 


White Blood Count


 6.5 x10^3/uL


(4.0-11.0)


 


Red Blood Count


 3.39 x10^6/uL


(3.50-5.40)


 


Hemoglobin


 8.9 g/dL


(12.0-15.5)


 


Hematocrit


 26.9 %


(36.0-47.0)


 


Mean Corpuscular Volume 79 fL () 


 


Mean Corpuscular Hemoglobin 26 pg (25-35) 


 


Mean Corpuscular Hemoglobin


Concent 33 g/dL


(31-37)


 


Red Cell Distribution Width


 16.8 %


(11.5-14.5)


 


Platelet Count


 249 x10^3/uL


(140-400)


 


Neutrophils (%) (Auto) 70 % (31-73) 


 


Lymphocytes (%) (Auto) 20 % (24-48) 


 


Monocytes (%) (Auto) 9 % (0-9) 


 


Eosinophils (%) (Auto) 1 % (0-3) 


 


Basophils (%) (Auto) 1 % (0-3) 


 


Neutrophils # (Auto)


 4.6 x10^3/uL


(1.8-7.7)


 


Lymphocytes # (Auto)


 1.3 x10^3/uL


(1.0-4.8)


 


Monocytes # (Auto)


 0.6 x10^3/uL


(0.0-1.1)


 


Eosinophils # (Auto)


 0.0 x10^3/uL


(0.0-0.7)


 


Basophils # (Auto)


 0.1 x10^3/uL


(0.0-0.2)


 


Prothrombin Time


 13.7 SEC


(11.7-14.0)


 


Prothromb Time International


Ratio 1.1 (0.8-1.1) 





 


Activated Partial


Thromboplast Time 26 SEC (24-38) 





 


Sodium Level


 139 mmol/L


(136-145)


 


Potassium Level


 3.0 mmol/L


(3.5-5.1)


 


Chloride Level


 103 mmol/L


()


 


Carbon Dioxide Level


 27 mmol/L


(21-32)


 


Anion Gap 9 (6-14) 


 


Blood Urea Nitrogen


 23 mg/dL


(7-20)


 


Creatinine


 1.1 mg/dL


(0.6-1.0)


 


Estimated GFR


(Cockcroft-Gault) 49.1 





 


BUN/Creatinine Ratio 21 (6-20) 


 


Glucose Level


 115 mg/dL


(70-99)


 


Calcium Level


 8.3 mg/dL


(8.5-10.1)


 


Total Bilirubin


 0.5 mg/dL


(0.2-1.0)


 


Aspartate Amino Transf


(AST/SGOT) 717 U/L


(15-37)


 


Alanine Aminotransferase


(ALT/SGPT) 78 U/L (14-59) 





 


Alkaline Phosphatase


 79 U/L


()


 


Creatine Kinase


 48 U/L


()


 


Troponin I Quantitative


 0.058 ng/mL


(0.000-0.055)


 


NT-Pro-B-Type Natriuretic


Peptide 313 pg/mL


(0-124)


 


Total Protein


 6.3 g/dL


(6.4-8.2)


 


Albumin


 2.7 g/dL


(3.4-5.0)


 


Albumin/Globulin Ratio 0.8 (1.0-1.7) 


 


Lipase


 141 U/L


()








Laboratory Tests








Test


 1/26/20


13:00


 


White Blood Count


 6.5 x10^3/uL


(4.0-11.0)


 


Red Blood Count


 3.39 x10^6/uL


(3.50-5.40)


 


Hemoglobin


 8.9 g/dL


(12.0-15.5)


 


Hematocrit


 26.9 %


(36.0-47.0)


 


Mean Corpuscular Volume 79 fL () 


 


Mean Corpuscular Hemoglobin 26 pg (25-35) 


 


Mean Corpuscular Hemoglobin


Concent 33 g/dL


(31-37)


 


Red Cell Distribution Width


 16.8 %


(11.5-14.5)


 


Platelet Count


 249 x10^3/uL


(140-400)


 


Neutrophils (%) (Auto) 70 % (31-73) 


 


Lymphocytes (%) (Auto) 20 % (24-48) 


 


Monocytes (%) (Auto) 9 % (0-9) 


 


Eosinophils (%) (Auto) 1 % (0-3) 


 


Basophils (%) (Auto) 1 % (0-3) 


 


Neutrophils # (Auto)


 4.6 x10^3/uL


(1.8-7.7)


 


Lymphocytes # (Auto)


 1.3 x10^3/uL


(1.0-4.8)


 


Monocytes # (Auto)


 0.6 x10^3/uL


(0.0-1.1)


 


Eosinophils # (Auto)


 0.0 x10^3/uL


(0.0-0.7)


 


Basophils # (Auto)


 0.1 x10^3/uL


(0.0-0.2)


 


Prothrombin Time


 13.7 SEC


(11.7-14.0)


 


Prothromb Time International


Ratio 1.1 (0.8-1.1) 





 


Activated Partial


Thromboplast Time 26 SEC (24-38) 





 


Sodium Level


 139 mmol/L


(136-145)


 


Potassium Level


 3.0 mmol/L


(3.5-5.1)


 


Chloride Level


 103 mmol/L


()


 


Carbon Dioxide Level


 27 mmol/L


(21-32)


 


Anion Gap 9 (6-14) 


 


Blood Urea Nitrogen


 23 mg/dL


(7-20)


 


Creatinine


 1.1 mg/dL


(0.6-1.0)


 


Estimated GFR


(Cockcroft-Gault) 49.1 





 


BUN/Creatinine Ratio 21 (6-20) 


 


Glucose Level


 115 mg/dL


(70-99)


 


Calcium Level


 8.3 mg/dL


(8.5-10.1)


 


Total Bilirubin


 0.5 mg/dL


(0.2-1.0)


 


Aspartate Amino Transf


(AST/SGOT) 717 U/L


(15-37)


 


Alanine Aminotransferase


(ALT/SGPT) 78 U/L (14-59) 





 


Alkaline Phosphatase


 79 U/L


()


 


Creatine Kinase


 48 U/L


()


 


Troponin I Quantitative


 0.058 ng/mL


(0.000-0.055)


 


NT-Pro-B-Type Natriuretic


Peptide 313 pg/mL


(0-124)


 


Total Protein


 6.3 g/dL


(6.4-8.2)


 


Albumin


 2.7 g/dL


(3.4-5.0)


 


Albumin/Globulin Ratio 0.8 (1.0-1.7) 


 


Lipase


 141 U/L


()











Images


Images


                                                                01


Material submitted:                                        .


liver - LIVER LESION BIOPSY


.                                                                01


Clinical history:                                          .


Liver lesion


.                                                                02


**********************************************************************


Diagnosis:


Liver biopsy, liver lesion:


- HEPATOCELLULAR CARCINOMA, MODERATELY DIFFERENTIATED. SEE COMMENT.


(JPM:pit/mml 08/13/2019)


QTP/08/14/2019


**********************************************************************


.                                                                02


Comment:


Sections of the liver lesion biopsy show extensive replacement of liver


parenchyma by a malignant epithelial neoplasm.  The malignant cells have a


trabecular arrangement.  The trabeculae are surrounded by what appear to


be endothelial cells.  The malignant cells have ample amounts of


eosinophilic cytoplasm and possess enlarged, rounded to ovoid nuclei


containing macronucleoli.  There are mitotic figures present. A panel of


immunoperoxidase stains is obtained on block A1 and yields the following


results:


.


Cytokeratin YEIMY:  Tumor cells positive


Cytokeratin 7:  Tumor cells negative


Cytokeratin 20:  Tumor cells negative


Polyclonal CEA:  Tumor positive showing canalicular pattern of staining


Hepatocyte Specific Antigen:  Tumor cells focally positive


CD34:  Positive staining of endothelial cells surrounding trabeculae of


tumor cells


CT Abdomen and Pelvis without contrast  


 


History: Abdominal pain, liver cancer


 


Technique: Noncontrast CT imaging was performed of the abdomen and pelvis.


Multiplanar images are reviewed.  Exposure: One or more of the following 


individualized dose reduction techniques were utilized for this 


examination:  1. Automated exposure control  2. Adjustment of the mA 


and/or kV according to patient size  3. Use of iterative reconstruction 


technique.


 


Comparison:  December 23, 2019


 


Findings: There is some motion. There is new mild hyperdense complex fluid


or more likely blood products of the bilateral paracolic gutters in the 


lateral abdomen bilaterally, somewhat greater on the right than the left. 


On the left, there is superior extent to the level of the spleen. There is


some extent about the colon bilaterally. Density on the left is up about 


65 Hounsfield units and on the right about 49 Hounsfield units There is 


also some extent to the more dependent pelvis bilaterally. 


 


There is coronary calcification. There is no significant pleural fluid of 


the visualized lung bases. There is again large hepatic mass centered in 


the left lobe difficult to accurately compare, grossly estimated about 


12.2 cm transverse by 7.3 cm AP by 9.6 cm cc, grossly similar. Gallbladder


is present without obvious intraluminal abnormality by CT although more 


distended on this exam. There is again artifact created by coil pack in 


the region of maida hepatis. There is no adrenal nodularity. There is no 


hydronephrosis of either kidney. There is a very small 0.1 cm inferior 


right renal calculus. Accurate evaluation of bowel is limited without oral


contrast. Urinary bladder is again distended. There is again calcified 


plaque of the abdominal aorta and iliac arteries, stent in the right iliac


artery as seen previously.


 


Impression: 


 


1. There is new mild hemorrhage or less likely complex fluid of the 


paracolic gutters bilaterally right greater than left, also some extent to


the pelvis. Source is uncertain.


2. There is again large mass in the left lobe of liver, size fairly 


similar although limited comparison without intravenous contrast.


3. Urinary bladder is distended.


4. There is nonspecific relative increased distention of the gallbladder 


on this exam.


 


Electronically signed by: Oz Muñoz MD (1/26/2020 2:17 PM) [facility]














DICTATED and SIGNED BY:     ZO MUÑOZ MD


DATE:     01/26/20 1417





VTE Prophylaxis Ordered


VTE Prophylaxis Devices:  No


VTE Pharmacological Prophylaxi:  Yes





Assessment/Plan


Assessment/Plan


 


Impression: 


 


1. new mild hemorrhage or less likely complex fluid of the  paracolic gutters 

bilaterally right greater than left, also some extent to the pelvis. Source is 

uncertain.


2. large mass in the left lobe of liver, size fairly  similar although limited 

comparison without intravenous contrast.


3. Urinary bladder is distended.


4. There is nonspecific relative increased distention of the gallbladder  on 

this exam.


5. HTN.


6. COPD.


7. 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


8. Hepatocellular carcinoma.





HEPATOCELLULAR CARCINOMA, MODERATELY DIFFERENTIATED. SEE COMMENT.


(JPM:pit/mml 08/13/2019)





9. Weight loss.


10. Elevated AFP.


11. hypokalemia











plan





admit


iv fluid support


icu bed


GI CONSULT


replete angela








37 min cc time











PETER ATKINSON MD          Jan 26, 2020 13:45
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
## 2020-01-26 NOTE — RAD
CT Abdomen and Pelvis without contrast  

 

History: Abdominal pain, liver cancer

 

Technique: Noncontrast CT imaging was performed of the abdomen and pelvis.

Multiplanar images are reviewed.  Exposure: One or more of the following 

individualized dose reduction techniques were utilized for this 

examination:  1. Automated exposure control  2. Adjustment of the mA 

and/or kV according to patient size  3. Use of iterative reconstruction 

technique.

 

Comparison:  December 23, 2019

 

Findings: There is some motion. There is new mild hyperdense complex fluid

or more likely blood products of the bilateral paracolic gutters in the 

lateral abdomen bilaterally, somewhat greater on the right than the left. 

On the left, there is superior extent to the level of the spleen. There is

some extent about the colon bilaterally. Density on the left is up about 

65 Hounsfield units and on the right about 49 Hounsfield units There is 

also some extent to the more dependent pelvis bilaterally. 

 

There is coronary calcification. There is no significant pleural fluid of 

the visualized lung bases. There is again large hepatic mass centered in 

the left lobe difficult to accurately compare, grossly estimated about 

12.2 cm transverse by 7.3 cm AP by 9.6 cm cc, grossly similar. Gallbladder

is present without obvious intraluminal abnormality by CT although more 

distended on this exam. There is again artifact created by coil pack in 

the region of maida hepatis. There is no adrenal nodularity. There is no 

hydronephrosis of either kidney. There is a very small 0.1 cm inferior 

right renal calculus. Accurate evaluation of bowel is limited without oral

contrast. Urinary bladder is again distended. There is again calcified 

plaque of the abdominal aorta and iliac arteries, stent in the right iliac

artery as seen previously.

 

Impression: 

 

1. There is new mild hemorrhage or less likely complex fluid of the 

paracolic gutters bilaterally right greater than left, also some extent to

the pelvis. Source is uncertain.

2. There is again large mass in the left lobe of liver, size fairly 

similar although limited comparison without intravenous contrast.

3. Urinary bladder is distended.

4. There is nonspecific relative increased distention of the gallbladder 

on this exam.

 

Electronically signed by: José Miguel Bland MD (1/26/2020 2:17 PM) [facility]
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
## 2020-01-26 NOTE — EKG
[facility]

              [address] [place] [address]

Test Date:    2020               Test Time:    13:43:24

Pat Name:     CAMERON MUNOZ            Department:   

Patient ID:   [id number]           Room:          

Gender:       F                        Technician:   

:          [id number]               Requested By: GIAN ESTEVEZ

Order Number: [id number]           Reading MD:     

                                 Measurements

Intervals                              Axis          

Rate:         95                       P:            9

GA:           176                      QRS:          64

QRSD:         88                       T:            49

QT:           312                                    

QTc:          395                                    

                           Interpretive Statements

SINUS RHYTHM

CONSIDER RIGHT VENTRICULAR HYPERTROPHY

ST & T ABNORMALITY, CONSIDER

ANTERIOR ISCHEMIA OR LEFT VENTRICULAR STRAIN

INFEROLATERAL ISCHEMIA OR LEFT VENTRICULAR STRAIN

ABNORMAL ECG

RI6.01

No previous ECG available for comparison

## 2020-01-27 VITALS — SYSTOLIC BLOOD PRESSURE: 135 MMHG | DIASTOLIC BLOOD PRESSURE: 65 MMHG

## 2020-01-27 VITALS — SYSTOLIC BLOOD PRESSURE: 128 MMHG | DIASTOLIC BLOOD PRESSURE: 59 MMHG

## 2020-01-27 VITALS — SYSTOLIC BLOOD PRESSURE: 142 MMHG | DIASTOLIC BLOOD PRESSURE: 66 MMHG

## 2020-01-27 VITALS — DIASTOLIC BLOOD PRESSURE: 62 MMHG | SYSTOLIC BLOOD PRESSURE: 136 MMHG

## 2020-01-27 VITALS — DIASTOLIC BLOOD PRESSURE: 63 MMHG | SYSTOLIC BLOOD PRESSURE: 157 MMHG

## 2020-01-27 VITALS — DIASTOLIC BLOOD PRESSURE: 59 MMHG | SYSTOLIC BLOOD PRESSURE: 134 MMHG

## 2020-01-27 VITALS — DIASTOLIC BLOOD PRESSURE: 80 MMHG | SYSTOLIC BLOOD PRESSURE: 158 MMHG

## 2020-01-27 LAB
ALBUMIN SERPL-MCNC: 2.6 G/DL (ref 3.4–5)
ALBUMIN/GLOB SERPL: 0.7 {RATIO} (ref 1–1.7)
ALP SERPL-CCNC: 77 U/L (ref 46–116)
ALT SERPL-CCNC: 65 U/L (ref 14–59)
ANION GAP SERPL CALC-SCNC: 11 MMOL/L (ref 6–14)
APTT PPP: YELLOW S
AST SERPL-CCNC: 526 U/L (ref 15–37)
BACTERIA #/AREA URNS HPF: 0 /HPF
BASOPHILS # BLD AUTO: 0 X10^3/UL (ref 0–0.2)
BASOPHILS NFR BLD: 1 % (ref 0–3)
BILIRUB SERPL-MCNC: 0.6 MG/DL (ref 0.2–1)
BILIRUB UR QL STRIP: NEGATIVE
BUN SERPL-MCNC: 11 MG/DL (ref 7–20)
BUN/CREAT SERPL: 14 (ref 6–20)
CALCIUM SERPL-MCNC: 8.1 MG/DL (ref 8.5–10.1)
CHLORIDE SERPL-SCNC: 106 MMOL/L (ref 98–107)
CO2 SERPL-SCNC: 24 MMOL/L (ref 21–32)
CREAT SERPL-MCNC: 0.8 MG/DL (ref 0.6–1)
EOSINOPHIL NFR BLD: 0 % (ref 0–3)
EOSINOPHIL NFR BLD: 0 X10^3/UL (ref 0–0.7)
ERYTHROCYTE [DISTWIDTH] IN BLOOD BY AUTOMATED COUNT: 16.9 % (ref 11.5–14.5)
FIBRINOGEN PPP-MCNC: CLEAR MG/DL
GFR SERPLBLD BASED ON 1.73 SQ M-ARVRAT: 70.9 ML/MIN
GLOBULIN SER-MCNC: 3.8 G/DL (ref 2.2–3.8)
GLUCOSE SERPL-MCNC: 100 MG/DL (ref 70–99)
HCT VFR BLD CALC: 25.4 % (ref 36–47)
HGB BLD-MCNC: 8.4 G/DL (ref 12–15.5)
HYALINE CASTS #/AREA URNS LPF: (no result) /HPF
LYMPHOCYTES # BLD: 0.9 X10^3/UL (ref 1–4.8)
LYMPHOCYTES NFR BLD AUTO: 19 % (ref 24–48)
MCH RBC QN AUTO: 26 PG (ref 25–35)
MCHC RBC AUTO-ENTMCNC: 33 G/DL (ref 31–37)
MCV RBC AUTO: 79 FL (ref 79–100)
MONO #: 0.5 X10^3/UL (ref 0–1.1)
MONOCYTES NFR BLD: 10 % (ref 0–9)
NEUT #: 3.4 X10^3/UL (ref 1.8–7.7)
NEUTROPHILS NFR BLD AUTO: 69 % (ref 31–73)
NITRITE UR QL STRIP: NEGATIVE
PH UR STRIP: 6.5 [PH]
PLATELET # BLD AUTO: 217 X10^3/UL (ref 140–400)
POTASSIUM SERPL-SCNC: 3.1 MMOL/L (ref 3.5–5.1)
PROT SERPL-MCNC: 6.4 G/DL (ref 6.4–8.2)
PROT UR STRIP-MCNC: NEGATIVE MG/DL
RBC # BLD AUTO: 3.21 X10^6/UL (ref 3.5–5.4)
RBC #/AREA URNS HPF: 0 /HPF (ref 0–2)
SODIUM SERPL-SCNC: 141 MMOL/L (ref 136–145)
SQUAMOUS #/AREA URNS LPF: (no result) /LPF
UROBILINOGEN UR-MCNC: 4 MG/DL
WBC # BLD AUTO: 4.9 X10^3/UL (ref 4–11)
WBC #/AREA URNS HPF: (no result) /HPF (ref 0–4)

## 2020-01-27 RX ADMIN — BACITRACIN SCH MLS/HR: 5000 INJECTION, POWDER, FOR SOLUTION INTRAMUSCULAR at 22:58

## 2020-01-27 RX ADMIN — CARVEDILOL SCH MG: 3.12 TABLET, FILM COATED ORAL at 18:21

## 2020-01-27 RX ADMIN — Medication SCH CAP: at 09:44

## 2020-01-27 RX ADMIN — CARVEDILOL SCH MG: 3.12 TABLET, FILM COATED ORAL at 08:03

## 2020-01-27 RX ADMIN — CEFTRIAXONE SCH GM: 1 INJECTION, POWDER, FOR SOLUTION INTRAMUSCULAR; INTRAVENOUS at 23:03

## 2020-01-27 RX ADMIN — ACETAMINOPHEN PRN MG: 650 SOLUTION ORAL at 18:21

## 2020-01-27 RX ADMIN — Medication SCH CAP: at 23:03

## 2020-01-27 RX ADMIN — BACITRACIN SCH MLS/HR: 5000 INJECTION, POWDER, FOR SOLUTION INTRAMUSCULAR at 00:55

## 2020-01-27 RX ADMIN — IPRATROPIUM BROMIDE AND ALBUTEROL SULFATE SCH ML: .5; 3 SOLUTION RESPIRATORY (INHALATION) at 04:00

## 2020-01-27 RX ADMIN — IPRATROPIUM BROMIDE AND ALBUTEROL SULFATE SCH ML: .5; 3 SOLUTION RESPIRATORY (INHALATION) at 19:57

## 2020-01-27 RX ADMIN — BACITRACIN SCH MLS/HR: 5000 INJECTION, POWDER, FOR SOLUTION INTRAMUSCULAR at 10:27

## 2020-01-27 RX ADMIN — IPRATROPIUM BROMIDE AND ALBUTEROL SULFATE SCH ML: .5; 3 SOLUTION RESPIRATORY (INHALATION) at 12:21

## 2020-01-27 RX ADMIN — IPRATROPIUM BROMIDE AND ALBUTEROL SULFATE SCH ML: .5; 3 SOLUTION RESPIRATORY (INHALATION) at 00:00

## 2020-01-27 RX ADMIN — IPRATROPIUM BROMIDE AND ALBUTEROL SULFATE SCH ML: .5; 3 SOLUTION RESPIRATORY (INHALATION) at 08:35

## 2020-01-27 RX ADMIN — IPRATROPIUM BROMIDE AND ALBUTEROL SULFATE SCH ML: .5; 3 SOLUTION RESPIRATORY (INHALATION) at 15:52

## 2020-01-27 RX ADMIN — PANTOPRAZOLE SODIUM SCH MG: 40 INJECTION, POWDER, FOR SOLUTION INTRAVENOUS at 08:03

## 2020-01-27 RX ADMIN — ACETAMINOPHEN PRN MG: 650 SOLUTION ORAL at 06:33

## 2020-01-27 RX ADMIN — CITALOPRAM HYDROBROMIDE SCH MG: 20 TABLET ORAL at 09:44

## 2020-01-27 RX ADMIN — POTASSIUM CHLORIDE SCH MEQ: 1500 TABLET, EXTENDED RELEASE ORAL at 08:00

## 2020-01-27 RX ADMIN — ENOXAPARIN SODIUM SCH MG: 40 INJECTION SUBCUTANEOUS at 23:04

## 2020-01-27 RX ADMIN — ACETAMINOPHEN PRN MG: 650 SOLUTION ORAL at 23:06
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
## 2020-01-27 NOTE — NUR
SS following for discharge planning. SS reviewed pt chart. Pt is from home with family and 
is currently on room air. PT/OT evaluated pt and recommended skilled nursing unit. SS will 
continue to follow for discharge planning.

## 2020-01-27 NOTE — PDOC
Subjective:


Subjective:


Feels less weak today.


No n/v or diarrhea like at home.


No bleeding.


Sometimes feels like she has to catch her breath.





Objective:


Objective:


D/w nurse - says Dr. Aleman started clears, tolerating so far.


Cardiology and oncology asked to see.


Vital Signs:





                                   Vital Signs








  Date Time  Temp Pulse Resp B/P (MAP) Pulse Ox O2 Delivery O2 Flow Rate FiO2


 


1/27/20 09:44  88  135/68    


 


1/27/20 08:36     95 Room Air  


 


1/27/20 08:00 99.8  22     





 99.8       








Labs:





Laboratory Tests








Test


 1/26/20


13:00 1/26/20


17:40 1/26/20


18:40 1/27/20


04:50


 


White Blood Count 6.5 x10^3/uL    4.9 x10^3/uL 


 


Red Blood Count 3.39 x10^6/uL    3.21 x10^6/uL 


 


Hemoglobin 8.9 g/dL    8.4 g/dL 


 


Hematocrit 26.9 %    25.4 % 


 


Mean Corpuscular Volume 79 fL    79 fL 


 


Mean Corpuscular Hemoglobin 26 pg    26 pg 


 


Mean Corpuscular Hemoglobin


Concent 33 g/dL 


 


 


 33 g/dL 





 


Red Cell Distribution Width 16.8 %    16.9 % 


 


Platelet Count 249 x10^3/uL    217 x10^3/uL 


 


Neutrophils (%) (Auto) 70 %    69 % 


 


Lymphocytes (%) (Auto) 20 %    19 % 


 


Monocytes (%) (Auto) 9 %    10 % 


 


Eosinophils (%) (Auto) 1 %    0 % 


 


Basophils (%) (Auto) 1 %    1 % 


 


Neutrophils # (Auto) 4.6 x10^3/uL    3.4 x10^3/uL 


 


Lymphocytes # (Auto) 1.3 x10^3/uL    0.9 x10^3/uL 


 


Monocytes # (Auto) 0.6 x10^3/uL    0.5 x10^3/uL 


 


Eosinophils # (Auto) 0.0 x10^3/uL    0.0 x10^3/uL 


 


Basophils # (Auto) 0.1 x10^3/uL    0.0 x10^3/uL 


 


Prothrombin Time 13.7 SEC    


 


Prothromb Time International


Ratio 1.1 


 


 


 





 


Activated Partial


Thromboplast Time 26 SEC 


 


 


 





 


Sodium Level 139 mmol/L    141 mmol/L 


 


Potassium Level 3.0 mmol/L    3.1 mmol/L 


 


Chloride Level 103 mmol/L    106 mmol/L 


 


Carbon Dioxide Level 27 mmol/L    24 mmol/L 


 


Anion Gap 9    11 


 


Blood Urea Nitrogen 23 mg/dL    11 mg/dL 


 


Creatinine 1.1 mg/dL    0.8 mg/dL 


 


Estimated GFR


(Cockcroft-Gault) 49.1 


 


 


 70.9 





 


BUN/Creatinine Ratio 21    14 


 


Glucose Level 115 mg/dL    100 mg/dL 


 


Calcium Level 8.3 mg/dL    8.1 mg/dL 


 


Magnesium Level 1.9 mg/dL    


 


Total Bilirubin 0.5 mg/dL    0.6 mg/dL 


 


Aspartate Amino Transf


(AST/SGOT) 717 U/L 


 


 


 526 U/L 





 


Alanine Aminotransferase


(ALT/SGPT) 78 U/L 


 


 


 65 U/L 





 


Alkaline Phosphatase 79 U/L    77 U/L 


 


Creatine Kinase 48 U/L    


 


Troponin I Quantitative 0.058 ng/mL  0.066 ng/mL  0.049 ng/mL  


 


NT-Pro-B-Type Natriuretic


Peptide 313 pg/mL 


 


 


 





 


Total Protein 6.3 g/dL    6.4 g/dL 


 


Albumin 2.7 g/dL    2.6 g/dL 


 


Albumin/Globulin Ratio 0.8    0.7 


 


Lipase 141 U/L    


 


Test


 1/27/20


06:20 


 


 





 


Urine Collection Type Unknown    


 


Urine Color Yellow    


 


Urine Clarity Clear    


 


Urine pH 6.5    


 


Urine Specific Gravity 1.010    


 


Urine Protein Negative mg/dL    


 


Urine Glucose (UA) Negative mg/dL    


 


Urine Ketones (Stick) Trace mg/dL    


 


Urine Blood Negative    


 


Urine Nitrite Negative    


 


Urine Bilirubin Negative    


 


Urine Urobilinogen Dipstick 4.0 mg/dL    


 


Urine Leukocyte Esterase Negative    


 


Urine RBC 0 /HPF    


 


Urine WBC 1-4 /HPF    


 


Urine Squamous Epithelial


Cells None /LPF 


 


 


 





 


Urine Bacteria 0 /HPF    


 


Urine Hyaline Casts Few /HPF    











PE:





GEN: NAD


LUNGS: CTAB


HEART: RRR


ABD: NABS, soft, non-tender


NEURO/PSYCH: A & O 3, flat





A/P:


Abd pain, n/v, diarrhea - better


Anemia, abnormal CT, fever


HCC, possible hemorrhage


H/o GERD and proctitis





--


Continue same per GI, will review w/ Dr. Henry.





Hemodynamically unstable?:  No


Is patient in severe pain?:  No


Is NPO status required?:  Yes











DAHIANA NAVA         Jan 27, 2020 10:12

## 2020-01-27 NOTE — PDOC
SURGICAL PROGRESS NOTE


Subjective


Pt with c/o fatigue, denies abd pain


Vital Signs





Vital Signs








  Date Time  Temp Pulse Resp B/P (MAP) Pulse Ox O2 Delivery O2 Flow Rate FiO2


 


1/27/20 09:44  88  135/68    


 


1/27/20 08:36     95 Room Air  


 


1/27/20 08:00 99.8  22     





 99.8       








I&O











Intake and Output 


 


 1/27/20





 07:00


 


Intake Total 2171 ml


 


Output Total 1790 ml


 


Balance 381 ml


 


 


 


Intake IV Total 1000 ml


 


Blood Product IV Normal Saline Flush 1171 ml


 


Output Urine Total 1790 ml








General:  Alert, Oriented X3, Cooperative, No acute distress


Abdomen:  Soft, No tenderness


Labs





Laboratory Tests








Test


 1/26/20


13:00 1/26/20


17:40 1/26/20


18:40 1/27/20


04:50


 


White Blood Count


 6.5 x10^3/uL


(4.0-11.0) 


 


 4.9 x10^3/uL


(4.0-11.0)


 


Red Blood Count


 3.39 x10^6/uL


(3.50-5.40) 


 


 3.21 x10^6/uL


(3.50-5.40)


 


Hemoglobin


 8.9 g/dL


(12.0-15.5) 


 


 8.4 g/dL


(12.0-15.5)


 


Hematocrit


 26.9 %


(36.0-47.0) 


 


 25.4 %


(36.0-47.0)


 


Mean Corpuscular Volume 79 fL ()    79 fL () 


 


Mean Corpuscular Hemoglobin 26 pg (25-35)    26 pg (25-35) 


 


Mean Corpuscular Hemoglobin


Concent 33 g/dL


(31-37) 


 


 33 g/dL


(31-37)


 


Red Cell Distribution Width


 16.8 %


(11.5-14.5) 


 


 16.9 %


(11.5-14.5)


 


Platelet Count


 249 x10^3/uL


(140-400) 


 


 217 x10^3/uL


(140-400)


 


Neutrophils (%) (Auto) 70 % (31-73)    69 % (31-73) 


 


Lymphocytes (%) (Auto) 20 % (24-48)    19 % (24-48) 


 


Monocytes (%) (Auto) 9 % (0-9)    10 % (0-9) 


 


Eosinophils (%) (Auto) 1 % (0-3)    0 % (0-3) 


 


Basophils (%) (Auto) 1 % (0-3)    1 % (0-3) 


 


Neutrophils # (Auto)


 4.6 x10^3/uL


(1.8-7.7) 


 


 3.4 x10^3/uL


(1.8-7.7)


 


Lymphocytes # (Auto)


 1.3 x10^3/uL


(1.0-4.8) 


 


 0.9 x10^3/uL


(1.0-4.8)


 


Monocytes # (Auto)


 0.6 x10^3/uL


(0.0-1.1) 


 


 0.5 x10^3/uL


(0.0-1.1)


 


Eosinophils # (Auto)


 0.0 x10^3/uL


(0.0-0.7) 


 


 0.0 x10^3/uL


(0.0-0.7)


 


Basophils # (Auto)


 0.1 x10^3/uL


(0.0-0.2) 


 


 0.0 x10^3/uL


(0.0-0.2)


 


Prothrombin Time


 13.7 SEC


(11.7-14.0) 


 


 





 


Prothromb Time International


Ratio 1.1 (0.8-1.1) 


 


 


 





 


Activated Partial


Thromboplast Time 26 SEC (24-38) 


 


 


 





 


Sodium Level


 139 mmol/L


(136-145) 


 


 141 mmol/L


(136-145)


 


Potassium Level


 3.0 mmol/L


(3.5-5.1) 


 


 3.1 mmol/L


(3.5-5.1)


 


Chloride Level


 103 mmol/L


() 


 


 106 mmol/L


()


 


Carbon Dioxide Level


 27 mmol/L


(21-32) 


 


 24 mmol/L


(21-32)


 


Anion Gap 9 (6-14)    11 (6-14) 


 


Blood Urea Nitrogen


 23 mg/dL


(7-20) 


 


 11 mg/dL


(7-20)


 


Creatinine


 1.1 mg/dL


(0.6-1.0) 


 


 0.8 mg/dL


(0.6-1.0)


 


Estimated GFR


(Cockcroft-Gault) 49.1 


 


 


 70.9 





 


BUN/Creatinine Ratio 21 (6-20)    14 (6-20) 


 


Glucose Level


 115 mg/dL


(70-99) 


 


 100 mg/dL


(70-99)


 


Calcium Level


 8.3 mg/dL


(8.5-10.1) 


 


 8.1 mg/dL


(8.5-10.1)


 


Magnesium Level


 1.9 mg/dL


(1.8-2.4) 


 


 





 


Total Bilirubin


 0.5 mg/dL


(0.2-1.0) 


 


 0.6 mg/dL


(0.2-1.0)


 


Aspartate Amino Transf


(AST/SGOT) 717 U/L


(15-37) 


 


 526 U/L


(15-37)


 


Alanine Aminotransferase


(ALT/SGPT) 78 U/L (14-59) 


 


 


 65 U/L (14-59) 





 


Alkaline Phosphatase


 79 U/L


() 


 


 77 U/L


()


 


Creatine Kinase


 48 U/L


() 


 


 





 


Troponin I Quantitative


 0.058 ng/mL


(0.000-0.055) 0.066 ng/mL


(0.000-0.055) 0.049 ng/mL


(0.000-0.055) 





 


NT-Pro-B-Type Natriuretic


Peptide 313 pg/mL


(0-124) 


 


 





 


Total Protein


 6.3 g/dL


(6.4-8.2) 


 


 6.4 g/dL


(6.4-8.2)


 


Albumin


 2.7 g/dL


(3.4-5.0) 


 


 2.6 g/dL


(3.4-5.0)


 


Albumin/Globulin Ratio 0.8 (1.0-1.7)    0.7 (1.0-1.7) 


 


Lipase


 141 U/L


() 


 


 





 


Test


 1/27/20


06:20 


 


 





 


Urine Collection Type Unknown    


 


Urine Color Yellow    


 


Urine Clarity Clear    


 


Urine pH 6.5    


 


Urine Specific Gravity 1.010    


 


Urine Protein


 Negative mg/dL


(NEG-TRACE) 


 


 





 


Urine Glucose (UA)


 Negative mg/dL


(NEG) 


 


 





 


Urine Ketones (Stick)


 Trace mg/dL


(NEG) 


 


 





 


Urine Blood Negative (NEG)    


 


Urine Nitrite Negative (NEG)    


 


Urine Bilirubin Negative (NEG)    


 


Urine Urobilinogen Dipstick


 4.0 mg/dL (0.2


mg/dL) 


 


 





 


Urine Leukocyte Esterase Negative (NEG)    


 


Urine RBC 0 /HPF (0-2)    


 


Urine WBC 1-4 /HPF (0-4)    


 


Urine Squamous Epithelial


Cells None /LPF 


 


 


 





 


Urine Bacteria 0 /HPF (0-FEW)    


 


Urine Hyaline Casts Few /HPF    








Laboratory Tests








Test


 1/26/20


13:00 1/26/20


17:40 1/26/20


18:40 1/27/20


04:50


 


White Blood Count


 6.5 x10^3/uL


(4.0-11.0) 


 


 4.9 x10^3/uL


(4.0-11.0)


 


Red Blood Count


 3.39 x10^6/uL


(3.50-5.40) 


 


 3.21 x10^6/uL


(3.50-5.40)


 


Hemoglobin


 8.9 g/dL


(12.0-15.5) 


 


 8.4 g/dL


(12.0-15.5)


 


Hematocrit


 26.9 %


(36.0-47.0) 


 


 25.4 %


(36.0-47.0)


 


Mean Corpuscular Volume 79 fL ()    79 fL () 


 


Mean Corpuscular Hemoglobin 26 pg (25-35)    26 pg (25-35) 


 


Mean Corpuscular Hemoglobin


Concent 33 g/dL


(31-37) 


 


 33 g/dL


(31-37)


 


Red Cell Distribution Width


 16.8 %


(11.5-14.5) 


 


 16.9 %


(11.5-14.5)


 


Platelet Count


 249 x10^3/uL


(140-400) 


 


 217 x10^3/uL


(140-400)


 


Neutrophils (%) (Auto) 70 % (31-73)    69 % (31-73) 


 


Lymphocytes (%) (Auto) 20 % (24-48)    19 % (24-48) 


 


Monocytes (%) (Auto) 9 % (0-9)    10 % (0-9) 


 


Eosinophils (%) (Auto) 1 % (0-3)    0 % (0-3) 


 


Basophils (%) (Auto) 1 % (0-3)    1 % (0-3) 


 


Neutrophils # (Auto)


 4.6 x10^3/uL


(1.8-7.7) 


 


 3.4 x10^3/uL


(1.8-7.7)


 


Lymphocytes # (Auto)


 1.3 x10^3/uL


(1.0-4.8) 


 


 0.9 x10^3/uL


(1.0-4.8)


 


Monocytes # (Auto)


 0.6 x10^3/uL


(0.0-1.1) 


 


 0.5 x10^3/uL


(0.0-1.1)


 


Eosinophils # (Auto)


 0.0 x10^3/uL


(0.0-0.7) 


 


 0.0 x10^3/uL


(0.0-0.7)


 


Basophils # (Auto)


 0.1 x10^3/uL


(0.0-0.2) 


 


 0.0 x10^3/uL


(0.0-0.2)


 


Prothrombin Time


 13.7 SEC


(11.7-14.0) 


 


 





 


Prothromb Time International


Ratio 1.1 (0.8-1.1) 


 


 


 





 


Activated Partial


Thromboplast Time 26 SEC (24-38) 


 


 


 





 


Sodium Level


 139 mmol/L


(136-145) 


 


 141 mmol/L


(136-145)


 


Potassium Level


 3.0 mmol/L


(3.5-5.1) 


 


 3.1 mmol/L


(3.5-5.1)


 


Chloride Level


 103 mmol/L


() 


 


 106 mmol/L


()


 


Carbon Dioxide Level


 27 mmol/L


(21-32) 


 


 24 mmol/L


(21-32)


 


Anion Gap 9 (6-14)    11 (6-14) 


 


Blood Urea Nitrogen


 23 mg/dL


(7-20) 


 


 11 mg/dL


(7-20)


 


Creatinine


 1.1 mg/dL


(0.6-1.0) 


 


 0.8 mg/dL


(0.6-1.0)


 


Estimated GFR


(Cockcroft-Gault) 49.1 


 


 


 70.9 





 


BUN/Creatinine Ratio 21 (6-20)    14 (6-20) 


 


Glucose Level


 115 mg/dL


(70-99) 


 


 100 mg/dL


(70-99)


 


Calcium Level


 8.3 mg/dL


(8.5-10.1) 


 


 8.1 mg/dL


(8.5-10.1)


 


Magnesium Level


 1.9 mg/dL


(1.8-2.4) 


 


 





 


Total Bilirubin


 0.5 mg/dL


(0.2-1.0) 


 


 0.6 mg/dL


(0.2-1.0)


 


Aspartate Amino Transf


(AST/SGOT) 717 U/L


(15-37) 


 


 526 U/L


(15-37)


 


Alanine Aminotransferase


(ALT/SGPT) 78 U/L (14-59) 


 


 


 65 U/L (14-59) 





 


Alkaline Phosphatase


 79 U/L


() 


 


 77 U/L


()


 


Creatine Kinase


 48 U/L


() 


 


 





 


Troponin I Quantitative


 0.058 ng/mL


(0.000-0.055) 0.066 ng/mL


(0.000-0.055) 0.049 ng/mL


(0.000-0.055) 





 


NT-Pro-B-Type Natriuretic


Peptide 313 pg/mL


(0-124) 


 


 





 


Total Protein


 6.3 g/dL


(6.4-8.2) 


 


 6.4 g/dL


(6.4-8.2)


 


Albumin


 2.7 g/dL


(3.4-5.0) 


 


 2.6 g/dL


(3.4-5.0)


 


Albumin/Globulin Ratio 0.8 (1.0-1.7)    0.7 (1.0-1.7) 


 


Lipase


 141 U/L


() 


 


 





 


Test


 1/27/20


06:20 


 


 





 


Urine Collection Type Unknown    


 


Urine Color Yellow    


 


Urine Clarity Clear    


 


Urine pH 6.5    


 


Urine Specific Gravity 1.010    


 


Urine Protein


 Negative mg/dL


(NEG-TRACE) 


 


 





 


Urine Glucose (UA)


 Negative mg/dL


(NEG) 


 


 





 


Urine Ketones (Stick)


 Trace mg/dL


(NEG) 


 


 





 


Urine Blood Negative (NEG)    


 


Urine Nitrite Negative (NEG)    


 


Urine Bilirubin Negative (NEG)    


 


Urine Urobilinogen Dipstick


 4.0 mg/dL (0.2


mg/dL) 


 


 





 


Urine Leukocyte Esterase Negative (NEG)    


 


Urine RBC 0 /HPF (0-2)    


 


Urine WBC 1-4 /HPF (0-4)    


 


Urine Squamous Epithelial


Cells None /LPF 


 


 


 





 


Urine Bacteria 0 /HPF (0-FEW)    


 


Urine Hyaline Casts Few /HPF    








Problem List


Problems


Medical Problems:


(1) Abdominal pain


Status: Acute  





(2) Anemia


Status: Acute  





(3) Elevated troponin


Status: Acute  





(4) Generalized weakness


Status: Acute  





(5) Hypoalbuminemia


Status: Acute  





(6) Liver cancer


Status: Acute  





(7) Nausea and vomiting


Status: Acute  





(8) Renal insufficiency


Status: Acute  








Assessment/Plan


cont supportive care


pt is prohibitive surgical candidate.











EN STEVENS MD             Jan 27, 2020 11:01

## 2020-01-27 NOTE — PDOC
[facility] [facility] PROGRESS NOTE


Chief Complaint


Chief Complaint


Nausea and vomiting


Abdominal pain


Elevated troponin


Hypokalemia


Hepatocellular carcinoma


Anemia


Renal insufficiency


Hypoalbuminemia


Weakness


Falls


Dehydration


Debility





History of Present Illness


History of Present Illness


[id number]


Patient seen and examined in the ICU


Chart reviewed


Discussed with RN


Patient appears quite frail





Vitals/I&O


Vitals/I&O:





                                   Vital Signs








  Date Time  Temp Pulse Resp B/P (MAP) Pulse Ox O2 Delivery O2 Flow Rate FiO2


 


1/27/20 08:03  94  135/65    


 


1/27/20 05:00 100.4       





 100.4       


 


1/27/20 04:34   24  96 Room Air  














                                    I & O   


 


 1/26/20 1/26/20 1/27/20





 14:59 22:59 06:59


 


Intake Total  100 ml 2071 ml


 


Output Total  150 ml 1640 ml


 


Balance  -50 ml 431 ml











Physical Exam


General:  Alert, Oriented X3, Cooperative, No acute distress, Other (very frail 

and weak appearing)


Heart:  Regular rate, Normal S1


Lungs:  Clear


Abdomen:  Soft, No tenderness, No masses


Extremities:  No clubbing, No cyanosis


Skin:  No rashes, No breakdown





Labs


Labs:





Laboratory Tests








Test


 1/26/20


13:00 1/26/20


17:40 1/26/20


18:40 1/27/20


04:50


 


White Blood Count


 6.5 x10^3/uL


(4.0-11.0) 


 


 4.9 x10^3/uL


(4.0-11.0)


 


Red Blood Count


 3.39 x10^6/uL


(3.50-5.40) 


 


 3.21 x10^6/uL


(3.50-5.40)


 


Hemoglobin


 8.9 g/dL


(12.0-15.5) 


 


 8.4 g/dL


(12.0-15.5)


 


Hematocrit


 26.9 %


(36.0-47.0) 


 


 25.4 %


(36.0-47.0)


 


Mean Corpuscular Volume 79 fL ()    79 fL () 


 


Mean Corpuscular Hemoglobin 26 pg (25-35)    26 pg (25-35) 


 


Mean Corpuscular Hemoglobin


Concent 33 g/dL


(31-37) 


 


 33 g/dL


(31-37)


 


Red Cell Distribution Width


 16.8 %


(11.5-14.5) 


 


 16.9 %


(11.5-14.5)


 


Platelet Count


 249 x10^3/uL


(140-400) 


 


 217 x10^3/uL


(140-400)


 


Neutrophils (%) (Auto) 70 % (31-73)    69 % (31-73) 


 


Lymphocytes (%) (Auto) 20 % (24-48)    19 % (24-48) 


 


Monocytes (%) (Auto) 9 % (0-9)    10 % (0-9) 


 


Eosinophils (%) (Auto) 1 % (0-3)    0 % (0-3) 


 


Basophils (%) (Auto) 1 % (0-3)    1 % (0-3) 


 


Neutrophils # (Auto)


 4.6 x10^3/uL


(1.8-7.7) 


 


 3.4 x10^3/uL


(1.8-7.7)


 


Lymphocytes # (Auto)


 1.3 x10^3/uL


(1.0-4.8) 


 


 0.9 x10^3/uL


(1.0-4.8)


 


Monocytes # (Auto)


 0.6 x10^3/uL


(0.0-1.1) 


 


 0.5 x10^3/uL


(0.0-1.1)


 


Eosinophils # (Auto)


 0.0 x10^3/uL


(0.0-0.7) 


 


 0.0 x10^3/uL


(0.0-0.7)


 


Basophils # (Auto)


 0.1 x10^3/uL


(0.0-0.2) 


 


 0.0 x10^3/uL


(0.0-0.2)


 


Prothrombin Time


 13.7 SEC


(11.7-14.0) 


 


 





 


Prothromb Time International


Ratio 1.1 (0.8-1.1) 


 


 


 





 


Activated Partial


Thromboplast Time 26 SEC (24-38) 


 


 


 





 


Sodium Level


 139 mmol/L


(136-145) 


 


 141 mmol/L


(136-145)


 


Potassium Level


 3.0 mmol/L


(3.5-5.1) 


 


 3.1 mmol/L


(3.5-5.1)


 


Chloride Level


 103 mmol/L


() 


 


 106 mmol/L


()


 


Carbon Dioxide Level


 27 mmol/L


(21-32) 


 


 24 mmol/L


(21-32)


 


Anion Gap 9 (6-14)    11 (6-14) 


 


Blood Urea Nitrogen


 23 mg/dL


(7-20) 


 


 11 mg/dL


(7-20)


 


Creatinine


 1.1 mg/dL


(0.6-1.0) 


 


 0.8 mg/dL


(0.6-1.0)


 


Estimated GFR


(Cockcroft-Gault) 49.1 


 


 


 70.9 





 


BUN/Creatinine Ratio 21 (6-20)    14 (6-20) 


 


Glucose Level


 115 mg/dL


(70-99) 


 


 100 mg/dL


(70-99)


 


Calcium Level


 8.3 mg/dL


(8.5-10.1) 


 


 8.1 mg/dL


(8.5-10.1)


 


Magnesium Level


 1.9 mg/dL


(1.8-2.4) 


 


 





 


Total Bilirubin


 0.5 mg/dL


(0.2-1.0) 


 


 0.6 mg/dL


(0.2-1.0)


 


Aspartate Amino Transf


(AST/SGOT) 717 U/L


(15-37) 


 


 526 U/L


(15-37)


 


Alanine Aminotransferase


(ALT/SGPT) 78 U/L (14-59) 


 


 


 65 U/L (14-59) 





 


Alkaline Phosphatase


 79 U/L


() 


 


 77 U/L


()


 


Creatine Kinase


 48 U/L


() 


 


 





 


Troponin I Quantitative


 0.058 ng/mL


(0.000-0.055) 0.066 ng/mL


(0.000-0.055) 0.049 ng/mL


(0.000-0.055) 





 


NT-Pro-B-Type Natriuretic


Peptide 313 pg/mL


(0-124) 


 


 





 


Total Protein


 6.3 g/dL


(6.4-8.2) 


 


 6.4 g/dL


(6.4-8.2)


 


Albumin


 2.7 g/dL


(3.4-5.0) 


 


 2.6 g/dL


(3.4-5.0)


 


Albumin/Globulin Ratio 0.8 (1.0-1.7)    0.7 (1.0-1.7) 


 


Lipase


 141 U/L


() 


 


 





 


Test


 1/27/20


06:20 


 


 





 


Urine Collection Type Unknown    


 


Urine Color Yellow    


 


Urine Clarity Clear    


 


Urine pH 6.5    


 


Urine Specific Gravity 1.010    


 


Urine Protein


 Negative mg/dL


(NEG-TRACE) 


 


 





 


Urine Glucose (UA)


 Negative mg/dL


(NEG) 


 


 





 


Urine Ketones (Stick)


 Trace mg/dL


(NEG) 


 


 





 


Urine Blood Negative (NEG)    


 


Urine Nitrite Negative (NEG)    


 


Urine Bilirubin Negative (NEG)    


 


Urine Urobilinogen Dipstick


 4.0 mg/dL (0.2


mg/dL) 


 


 





 


Urine Leukocyte Esterase Negative (NEG)    


 


Urine RBC 0 /HPF (0-2)    


 


Urine WBC 1-4 /HPF (0-4)    


 


Urine Squamous Epithelial


Cells None /LPF 


 


 


 





 


Urine Bacteria 0 /HPF (0-FEW)    


 


Urine Hyaline Casts Few /HPF    











Review of Systems


Review of Systems:


Complains of weakness complains of hunger





Assessment and Plan


Assessmemt and Plan


Problems


Medical Problems:


(1) Abdominal pain


Status: Acute  





(2) Anemia


Status: Acute  





(3) Elevated troponin


Status: Acute  





(4) Generalized weakness


Status: Acute  





(5) Hypoalbuminemia


Status: Acute  





(6) Liver cancer


Status: Acute  





(7) Nausea and vomiting


Status: Acute  





(8) Renal insufficiency


Status: Acute  





Nausea and vomiting


Abdominal pain


Elevated troponin


Hypokalemia


Hepatocellular carcinoma


Anemia


Renal insufficiency


Hypoalbuminemia


Weakness


Falls


Dehydration


Debility





Plan


Appreciate GI input


Will consult oncology


Will consult cardiology


Replace potassium


Trend hemoglobin


Clear liquid diet if tolerates


Home meds


DVT prophylaxis


Full code


Long-term prognosis extremely guarded given her stage IV hepatocellular 

carcinoma





Comment


Review of Relevant


I have reviewed the following items ignacio (where applicable) has been applied.


Medications:





Current Medications








 Medications


  (Trade)  Dose


 Ordered  Sig/Mikey


 Route


 PRN Reason  Start Time


 Stop Time Status Last Admin


Dose Admin


 


 Sodium Chloride  1,000 ml @ 


 1,000 mls/hr  Q1H


 IV


   1/26/20 12:51


 1/26/20 13:50 DC 1/26/20 12:51





 


 Sodium Chloride  1,000 ml @ 


 100 mls/hr  Q10H


 IV


   1/26/20 14:40


    1/27/20 00:55





 


 Acetaminophen


  (Tylenol)  650 mg  PRN Q4HRS  PRN


 GT


 TEMP OVER 100.4F OR MILD PAIN  1/26/20 14:45


    1/27/20 06:33





 


 Albuterol/


 Ipratropium


  (Duoneb)  3 ml  Q4HRS


 NEB


   1/26/20 16:00


    1/26/20 20:24





 


 Enoxaparin Sodium


  (Lovenox 40mg


 Syringe)  40 mg  Q24H


 SQ


   1/26/20 21:00


    1/26/20 20:52





 


 Alprazolam


  (Xanax)  0.5 mg  PRN BID  PRN


 PO


 ANXIETY / AGITATION (SEVERE)  1/26/20 21:00


    1/27/20 08:03





 


 Carvedilol


  (Coreg)  3.125 mg  BIDWMEALS


 PO


   1/26/20 17:00


    1/27/20 08:03





 


 Potassium


 Chloride/Water  100 ml @ 


 100 mls/hr  Q1H


 IV


   1/26/20 16:00


 1/26/20 19:59 DC 1/26/20 23:07





 


 Pantoprazole


 Sodium


  (PROTONIX VIAL


 for IV PUSH)  40 mg  DAILYAC


 IVP


   1/27/20 07:30


    1/27/20 08:03




















FAUSTINO SON III DO           Jan 27, 2020 08:21

## 2020-01-28 VITALS — DIASTOLIC BLOOD PRESSURE: 59 MMHG | SYSTOLIC BLOOD PRESSURE: 120 MMHG

## 2020-01-28 VITALS — SYSTOLIC BLOOD PRESSURE: 131 MMHG | DIASTOLIC BLOOD PRESSURE: 65 MMHG

## 2020-01-28 VITALS — SYSTOLIC BLOOD PRESSURE: 119 MMHG | DIASTOLIC BLOOD PRESSURE: 66 MMHG

## 2020-01-28 VITALS — DIASTOLIC BLOOD PRESSURE: 63 MMHG | SYSTOLIC BLOOD PRESSURE: 128 MMHG

## 2020-01-28 VITALS — DIASTOLIC BLOOD PRESSURE: 56 MMHG | SYSTOLIC BLOOD PRESSURE: 112 MMHG

## 2020-01-28 VITALS — DIASTOLIC BLOOD PRESSURE: 62 MMHG | SYSTOLIC BLOOD PRESSURE: 136 MMHG

## 2020-01-28 LAB
ANION GAP SERPL CALC-SCNC: 10 MMOL/L (ref 6–14)
BUN SERPL-MCNC: 5 MG/DL (ref 7–20)
CALCIUM SERPL-MCNC: 7.5 MG/DL (ref 8.5–10.1)
CHLORIDE SERPL-SCNC: 108 MMOL/L (ref 98–107)
CO2 SERPL-SCNC: 23 MMOL/L (ref 21–32)
CREAT SERPL-MCNC: 0.7 MG/DL (ref 0.6–1)
GFR SERPLBLD BASED ON 1.73 SQ M-ARVRAT: 82.7 ML/MIN
GLUCOSE SERPL-MCNC: 101 MG/DL (ref 70–99)
POTASSIUM SERPL-SCNC: 2.4 MMOL/L (ref 3.5–5.1)
SODIUM SERPL-SCNC: 141 MMOL/L (ref 136–145)

## 2020-01-28 RX ADMIN — PANTOPRAZOLE SODIUM SCH MG: 40 INJECTION, POWDER, FOR SOLUTION INTRAVENOUS at 07:51

## 2020-01-28 RX ADMIN — ACETAMINOPHEN PRN MG: 650 SOLUTION ORAL at 07:41

## 2020-01-28 RX ADMIN — CARVEDILOL SCH MG: 3.12 TABLET, FILM COATED ORAL at 18:03

## 2020-01-28 RX ADMIN — ACETAMINOPHEN PRN MG: 650 SOLUTION ORAL at 18:02

## 2020-01-28 RX ADMIN — Medication SCH CAP: at 21:01

## 2020-01-28 RX ADMIN — IPRATROPIUM BROMIDE AND ALBUTEROL SULFATE SCH ML: .5; 3 SOLUTION RESPIRATORY (INHALATION) at 07:51

## 2020-01-28 RX ADMIN — BACITRACIN SCH MLS/HR: 5000 INJECTION, POWDER, FOR SOLUTION INTRAMUSCULAR at 07:51

## 2020-01-28 RX ADMIN — ACETAMINOPHEN PRN MG: 650 SOLUTION ORAL at 23:33

## 2020-01-28 RX ADMIN — CITALOPRAM HYDROBROMIDE SCH MG: 20 TABLET ORAL at 08:44

## 2020-01-28 RX ADMIN — Medication SCH CAP: at 08:44

## 2020-01-28 RX ADMIN — CEFTRIAXONE SCH GM: 1 INJECTION, POWDER, FOR SOLUTION INTRAMUSCULAR; INTRAVENOUS at 21:08

## 2020-01-28 RX ADMIN — CARVEDILOL SCH MG: 3.12 TABLET, FILM COATED ORAL at 08:44

## 2020-01-28 RX ADMIN — POTASSIUM CHLORIDE SCH MEQ: 1500 TABLET, EXTENDED RELEASE ORAL at 08:48

## 2020-01-28 RX ADMIN — DEXTROSE, SODIUM CHLORIDE, AND POTASSIUM CHLORIDE SCH MLS/HR: 5; .45; .22 INJECTION INTRAVENOUS at 12:56
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
## 2020-01-28 NOTE — NUR
SS following up with discharge planning. SS met with pt to discuss skilled nursing unit and 
discharge planning. Pt requested SS contact her daughter, Mayte, [phone]. SS contacted 
pt's daughter, Mayte, and pt's daughter requested referral be phoned and faxed to [facility] in [place], [place], [phone]; fax [phone]. SS phoned and faxed 
referral. SS will await acceptance decision and insurance determination and will proceed 
accordingly with discharge planning.

## 2020-01-28 NOTE — PDOC
PROGRESS NOTES


Chief Complaint


Chief Complaint


Nausea and vomiting


Abdominal pain


Elevated troponin


Hypokalemia


Hepatocellular carcinoma stage 4


Anemia


Renal insufficiency


Hypoalbuminemia


Weakness


Falls


Dehydration


Debility





History of Present Illness


History of Present Illness


transferred out of ICU 1/28


SHe feels weak, agreeable to SNU which PT recommended


AFP elevated 300s, K 2.4 got PO kcl thsi AM





PLAN:


Further inc kCL PO


Recheck tmr


SW SNU screen


Full code


FF up HEme onc as OP


dc PPI IV, shift to PO


kcl iVF since on liquid diet - will be able to stop tmr if K better


MAintain prater - inserted 1/27 bec of retention


TArget dc tmr SNU with prater and ff up as OP heme onc for treatment plan re HCC 

stage 4





Vitals


Vitals





Vital Signs








  Date Time  Temp Pulse Resp B/P (MAP) Pulse Ox O2 Delivery O2 Flow Rate FiO2


 


1/28/20 11:00 97.8 85 18 112/56 (74) 96 Room Air  





 97.8       











Physical Exam


General:  Alert, Oriented X3, Cooperative, No acute distress


Heart:  Regular rate, Normal S1, Normal S2, Other (2/6 systolic murmur )


Lungs:  Clear


Abdomen:  Soft, Other (mild TTP LUQ)


Extremities:  No edema, Normal pulses


Skin:  No significant lesion





Labs


LABS





Laboratory Tests








Test


 1/28/20


03:10


 


Sodium Level


 141 mmol/L


(136-145)


 


Potassium Level


 2.4 mmol/L


(3.5-5.1)


 


Chloride Level


 108 mmol/L


()


 


Carbon Dioxide Level


 23 mmol/L


(21-32)


 


Anion Gap 10 (6-14) 


 


Blood Urea Nitrogen 5 mg/dL (7-20) 


 


Creatinine


 0.7 mg/dL


(0.6-1.0)


 


Estimated GFR


(Cockcroft-Gault) 82.7 





 


Glucose Level


 101 mg/dL


(70-99)


 


Calcium Level


 7.5 mg/dL


(8.5-10.1)


 


Iron Level


 12 ug/dL


()


 


Total Iron Binding Capacity


 265 ug/dL


(250-450)


 


Iron Saturation 5 % (15-34) 


 


Ferritin


 67 ng/mL


(8-252)











Review of Systems


Review of Systems


 weak, all else is neg 14 pt reviewed with her





Assessment and Plan


Assessmemt and Plan


Problems


Medical Problems:


(1) Abdominal pain


Status: Acute  





(2) Anemia


Status: Acute  





(3) Elevated troponin


Status: Acute  





(4) Generalized weakness


Status: Acute  





(5) Hypoalbuminemia


Status: Acute  





(6) Liver cancer


Status: Acute  





(7) Nausea and vomiting


Status: Acute  





(8) Renal insufficiency


Status: Acute  











Comment


Review of Relevant


I have reviewed the following items ignacio (where applicable) has been applied.


Labs





Laboratory Tests








Test


 1/26/20


17:40 1/26/20


18:40 1/27/20


04:50 1/27/20


06:20


 


Troponin I Quantitative


 0.066 ng/mL


(0.000-0.055) 0.049 ng/mL


(0.000-0.055) 


 





 


White Blood Count


 


 


 4.9 x10^3/uL


(4.0-11.0) 





 


Red Blood Count


 


 


 3.21 x10^6/uL


(3.50-5.40) 





 


Hemoglobin


 


 


 8.4 g/dL


(12.0-15.5) 





 


Hematocrit


 


 


 25.4 %


(36.0-47.0) 





 


Mean Corpuscular Volume   79 fL ()  


 


Mean Corpuscular Hemoglobin   26 pg (25-35)  


 


Mean Corpuscular Hemoglobin


Concent 


 


 33 g/dL


(31-37) 





 


Red Cell Distribution Width


 


 


 16.9 %


(11.5-14.5) 





 


Platelet Count


 


 


 217 x10^3/uL


(140-400) 





 


Neutrophils (%) (Auto)   69 % (31-73)  


 


Lymphocytes (%) (Auto)   19 % (24-48)  


 


Monocytes (%) (Auto)   10 % (0-9)  


 


Eosinophils (%) (Auto)   0 % (0-3)  


 


Basophils (%) (Auto)   1 % (0-3)  


 


Neutrophils # (Auto)


 


 


 3.4 x10^3/uL


(1.8-7.7) 





 


Lymphocytes # (Auto)


 


 


 0.9 x10^3/uL


(1.0-4.8) 





 


Monocytes # (Auto)


 


 


 0.5 x10^3/uL


(0.0-1.1) 





 


Eosinophils # (Auto)


 


 


 0.0 x10^3/uL


(0.0-0.7) 





 


Basophils # (Auto)


 


 


 0.0 x10^3/uL


(0.0-0.2) 





 


Sodium Level


 


 


 141 mmol/L


(136-145) 





 


Potassium Level


 


 


 3.1 mmol/L


(3.5-5.1) 





 


Chloride Level


 


 


 106 mmol/L


() 





 


Carbon Dioxide Level


 


 


 24 mmol/L


(21-32) 





 


Anion Gap   11 (6-14)  


 


Blood Urea Nitrogen


 


 


 11 mg/dL


(7-20) 





 


Creatinine


 


 


 0.8 mg/dL


(0.6-1.0) 





 


Estimated GFR


(Cockcroft-Gault) 


 


 70.9 


 





 


BUN/Creatinine Ratio   14 (6-20)  


 


Glucose Level


 


 


 100 mg/dL


(70-99) 





 


Calcium Level


 


 


 8.1 mg/dL


(8.5-10.1) 





 


Magnesium Level


 


 


 1.6 mg/dL


(1.8-2.4) 





 


Total Bilirubin


 


 


 0.6 mg/dL


(0.2-1.0) 





 


Aspartate Amino Transf


(AST/SGOT) 


 


 526 U/L


(15-37) 





 


Alanine Aminotransferase


(ALT/SGPT) 


 


 65 U/L (14-59) 


 





 


Alkaline Phosphatase


 


 


 77 U/L


() 





 


Total Protein


 


 


 6.4 g/dL


(6.4-8.2) 





 


Albumin


 


 


 2.6 g/dL


(3.4-5.0) 





 


Albumin/Globulin Ratio   0.7 (1.0-1.7)  


 


Urine Collection Type    Unknown 


 


Urine Color    Yellow 


 


Urine Clarity    Clear 


 


Urine pH    6.5 


 


Urine Specific Gravity    1.010 


 


Urine Protein


 


 


 


 Negative mg/dL


(NEG-TRACE)


 


Urine Glucose (UA)


 


 


 


 Negative mg/dL


(NEG)


 


Urine Ketones (Stick)


 


 


 


 Trace mg/dL


(NEG)


 


Urine Blood    Negative (NEG) 


 


Urine Nitrite    Negative (NEG) 


 


Urine Bilirubin    Negative (NEG) 


 


Urine Urobilinogen Dipstick


 


 


 


 4.0 mg/dL (0.2


mg/dL)


 


Urine Leukocyte Esterase    Negative (NEG) 


 


Urine RBC    0 /HPF (0-2) 


 


Urine WBC    1-4 /HPF (0-4) 


 


Urine Squamous Epithelial


Cells 


 


 


 None /LPF 





 


Urine Bacteria    0 /HPF (0-FEW) 


 


Urine Hyaline Casts    Few /HPF 


 


Test


 1/28/20


03:10 


 


 





 


Sodium Level


 141 mmol/L


(136-145) 


 


 





 


Potassium Level


 2.4 mmol/L


(3.5-5.1) 


 


 





 


Chloride Level


 108 mmol/L


() 


 


 





 


Carbon Dioxide Level


 23 mmol/L


(21-32) 


 


 





 


Anion Gap 10 (6-14)    


 


Blood Urea Nitrogen 5 mg/dL (7-20)    


 


Creatinine


 0.7 mg/dL


(0.6-1.0) 


 


 





 


Estimated GFR


(Cockcroft-Gault) 82.7 


 


 


 





 


Glucose Level


 101 mg/dL


(70-99) 


 


 





 


Calcium Level


 7.5 mg/dL


(8.5-10.1) 


 


 





 


Iron Level


 12 ug/dL


() 


 


 





 


Total Iron Binding Capacity


 265 ug/dL


(250-450) 


 


 





 


Iron Saturation 5 % (15-34)    


 


Ferritin


 67 ng/mL


(8-252) 


 


 











Laboratory Tests








Test


 1/28/20


03:10


 


Sodium Level


 141 mmol/L


(136-145)


 


Potassium Level


 2.4 mmol/L


(3.5-5.1)


 


Chloride Level


 108 mmol/L


()


 


Carbon Dioxide Level


 23 mmol/L


(21-32)


 


Anion Gap 10 (6-14) 


 


Blood Urea Nitrogen 5 mg/dL (7-20) 


 


Creatinine


 0.7 mg/dL


(0.6-1.0)


 


Estimated GFR


(Cockcroft-Gault) 82.7 





 


Glucose Level


 101 mg/dL


(70-99)


 


Calcium Level


 7.5 mg/dL


(8.5-10.1)


 


Iron Level


 12 ug/dL


()


 


Total Iron Binding Capacity


 265 ug/dL


(250-450)


 


Iron Saturation 5 % (15-34) 


 


Ferritin


 67 ng/mL


(8-252)








Microbiology


1/26/20 Blood Culture - Preliminary, Resulted


          NO GROWTH AFTER 1 DAY


Medications





Current Medications


Sodium Chloride 1,000 ml @  1,000 mls/hr Q1H IV  Last administered on 1/26/20at 

12:51;  Start 1/26/20 at 12:51;  Stop 1/26/20 at 13:50;  Status DC


Ondansetron HCl (Zofran) 4 mg 1X  ONCE IV ;  Start 1/26/20 at 13:00;  Stop 

1/26/20 at 13:01;  Status DC


Sodium Chloride (Normal Saline Flush) 3 ml QSHIFT  PRN IV AFTER MEDS AND BLOOD 

DRAWS;  Start 1/26/20 at 14:45


Sodium Chloride 1,000 ml @  100 mls/hr Q10H IV  Last administered on 1/28/20at 

07:51;  Start 1/26/20 at 14:40;  Stop 1/28/20 at 11:38;  Status DC


Ondansetron HCl (Zofran) 4 mg PRN Q4HRS  PRN IV NAUSEA/VOMITING;  Start 1/26/20 

at 14:45


Acetaminophen (Tylenol) 650 mg PRN Q4HRS  PRN GT TEMP OVER 100.4F OR MILD PAIN 

Last administered on 1/28/20at 07:41;  Start 1/26/20 at 14:45


Clonidine HCl (Catapres) 0.1 mg PRN Q6HRS  PRN PO SBP>160 OR DBP>90;  Start 

1/26/20 at 14:45


Docusate Sodium (Colace) 100 mg PRN BID  PRN PO CONSTIPATION;  Start 1/26/20 at 

14:45


Albuterol/ Ipratropium (Duoneb) 3 ml Q4HRS NEB  Last administered on 1/28/20at 

07:51;  Start 1/26/20 at 16:00;  Stop 1/28/20 at 13:31;  Status DC


Guaifenesin (Robitussin) 200 mg PRN Q4HRS  PRN PO COUGH;  Start 1/26/20 at 14:45


Lorazepam (Ativan) 0.5 mg PRN Q4HRS  PRN PO ANXIETY / AGITATION;  Start 1/26/20 

at 14:45;  Stop 1/28/20 at 11:18;  Status DC


Enoxaparin Sodium (Lovenox 40mg Syringe) 40 mg Q24H SQ  Last administered on 

1/27/20at 23:04;  Start 1/26/20 at 21:00;  Stop 1/28/20 at 11:38;  Status DC


Potassium Chloride/Water 100 ml @  100 mls/hr Q1H IV ;  Start 1/26/20 at 14:45; 

Stop 1/26/20 at 14:49;  Status DC


Alprazolam (Xanax) 0.5 mg PRN BID  PRN PO ANXIETY / AGITATION (SEVERE) Last 

administered on 1/27/20at 23:06;  Start 1/26/20 at 21:00


Amlodipine Besylate (Norvasc) 5 mg DAILY PO  Last administered on 1/28/20at 

08:44;  Start 1/26/20 at 16:00


Carvedilol (Coreg) 3.125 mg BIDWMEALS PO  Last administered on 1/28/20at 08:44; 

Start 1/26/20 at 17:00


Citalopram Hydrobromide (CeleXA) 20 mg DAILY PO  Last administered on 1/28/20at 

08:44;  Start 1/26/20 at 16:00


Lactobacillus Rhamnosus (Culturelle) 1 cap BID PO  Last administered on 

1/28/20at 08:44;  Start 1/26/20 at 21:00


Potassium Chloride (Klor-Con) 20 meq DAILYWBKFT PO  Last administered on 

1/28/20at 08:48;  Start 1/26/20 at 17:00;  Stop 1/28/20 at 11:38;  Status DC


Sodium Chloride 1,000 ml @  125 mls/hr Q8H IV ;  Start 1/26/20 at 14:46;  Stop 

1/26/20 at 15:09;  Status DC


Potassium Chloride/Water 100 ml @  100 mls/hr Q1H IV  Last administered on 

1/26/20at 23:07;  Start 1/26/20 at 16:00;  Stop 1/26/20 at 19:59;  Status DC


Alprazolam (Xanax) 0.25 mg PRN BID  PRN PO ANXIETY / AGITATION (MODERATE);  

Start 1/26/20 at 15:00


Pantoprazole Sodium (PROTONIX VIAL for IV PUSH) 40 mg DAILYAC IVP  Last 

administered on 1/28/20at 07:51;  Start 1/27/20 at 07:30;  Stop 1/28/20 at 

11:38;  Status DC


Potassium Bicarbonate (Potassium Effervescent Tablet) 40 meq 1X  ONCE PEG  Last 

administered on 1/27/20at 09:43;  Start 1/27/20 at 08:45;  Stop 1/27/20 at 

08:46;  Status DC


Ceftriaxone Sodium (Rocephin) 1 gm Q24H IVP  Last administered on 1/27/20at 

23:03;  Start 1/27/20 at 22:30


Potassium Chloride (Klor-Con) 40 meq 1X  ONCE PO  Last administered on 1/28/20at

07:37;  Start 1/28/20 at 06:30;  Stop 1/28/20 at 06:31;  Status DC


Potassium Chloride (Klor-Con) 40 meq DAILYWBKFT PO ;  Start 1/28/20 at 11:45


Potassium Chloride/Dextrose/ Sod Cl 1,000 ml @  75 mls/hr [id number] IV  Last 

administered on 1/28/20at 12:56;  Start 1/28/20 at 12:00


Pantoprazole Sodium (Protonix) 40 mg DAILYAC PO ;  Start 1/29/20 at 07:30


Albuterol/ Ipratropium (Duoneb) 3 ml DAILY08 NEB ;  Start 1/29/20 at 08:00


Albuterol Sulfate (Ventolin Neb Soln) 2.5 mg PRN Q4HRS  PRN NEB SHORTNESS OF 

BREATH;  Start 1/28/20 at 13:30





Active Scripts


Active


Culturelle (Lactobacillus Rhamnosus Gg) 1 Each Cap.sprink 1 Cap PO BID 14 Days


Colace (Docusate Sodium) 100 Mg Capsule 100 Mg PO PRN BID PRN 14 Days


Klor-Con M20 (Potassium Chloride) 20 Meq Tab.er.prt 20 Meq PO DAILYWBKFT 10 Days


Aspirin Ec (Aspirin) 325 Mg Tablet.dr 325 Mg PO DAILYWBKFT 30 Days


Amlodipine Besylate 5 Mg Tablet 5 Mg PO DAILY 30 Days


Reported


Citalopram Hbr (Citalopram Hydrobromide) 20 Mg Tablet 1 Tab PO DAILY


[atrovent hfa]   17 Mcg  QID


Xanax (Alprazolam) 0.5 Mg Tablet 0.5-1 Tab PO BID


Coreg  ** (Carvedilol) 3.125 Mg Tablet 1 Tab PO BID


Nexium Capsule (Esomeprazole Magnesium) 40 Mg Capsule.dr 40 Mg PO DAILYAC


Atorvastatin Calcium 40 Mg Tablet 40 Mg PO HS


Vitals/I & O





Vital Sign - Last 24 Hours








 1/27/20 1/27/20 1/27/20 1/27/20





 15:00 15:53 18:21 18:22


 


Temp 98.7   98.7





 98.7   98.7


 


Pulse 98  98 98


 


Resp 20   19


 


B/P (MAP) 136/62 (86)  157/63 157/63 (94)


 


Pulse Ox 95   95


 


O2 Delivery Room Air Room Air  Room Air


 


    





    





 1/27/20 1/27/20 1/27/20 1/28/20





 19:00 20:00 23:00 03:00


 


Temp 101.6  99.0 98.8





 101.6  99.0 98.8


 


Pulse 97  90 87


 


Resp 17  18 18


 


B/P (MAP) 128/59 (82)  142/66 (91) 131/65 (87)


 


Pulse Ox 94  96 94


 


O2 Delivery Room Air Room Air Room Air Room Air


 


    





    





 1/28/20 1/28/20 1/28/20 1/28/20





 07:00 07:51 08:44 08:44


 


Temp 98.1   





 98.1   


 


Pulse 97  97 97


 


Resp 16   


 


B/P (MAP) 136/62 (86)  136/62 136/62


 


Pulse Ox 96   


 


O2 Delivery Room Air Room Air  


 


    





    





 1/28/20   





 11:00   


 


Temp 97.8   





 97.8   


 


Pulse 85   


 


Resp 18   


 


B/P (MAP) 112/56 (74)   


 


Pulse Ox 96   


 


O2 Delivery Room Air   














Intake and Output   


 


 1/27/20 1/27/20 1/28/20





 15:00 23:00 07:00


 


Intake Total  120 ml 


 


Output Total 1075 ml 300 ml 925 ml


 


Balance -1075 ml -180 ml -925 ml











Nutrition Consultation


Dietary Evaluation:


Recommendations by RD:  Dietary education by RD, Increase Calorie Intake, 

Protein supplementation


Comments:  


REC advance diet as able/tolerated, goal diet cardiac w/oral supplements 


REC Ensure clear BID while on clear liquid diet


Expected Outcomes/Goals:  


diet advancement





Malnutrition Findings:


Food and Nutrition Intake (Sev:  <50% est energy req 5days


Weight Status:  Appropriate





Hemodynamically unstable?:  No


Is patient in severe pain?:  No


Is NPO status required?:  PUNEET Butt MD           Jan 28, 2020 14:05

## 2020-01-28 NOTE — PDOC
Subjective:


Subjective:


Denies and abd pain, says she stooled but not sure if diarrhea.





Objective:


Objective:


Tmax 101.6


Vital Signs:





                                   Vital Signs








  Date Time  Temp Pulse Resp B/P (MAP) Pulse Ox O2 Delivery O2 Flow Rate FiO2


 


1/28/20 08:44  97  136/62    


 


1/28/20 07:51      Room Air  


 


1/28/20 07:00 98.1  16  96   





 98.1       








Labs:





Laboratory Tests








Test


 1/28/20


03:10


 


Sodium Level 141 mmol/L 


 


Potassium Level 2.4 mmol/L 


 


Chloride Level 108 mmol/L 


 


Carbon Dioxide Level 23 mmol/L 


 


Anion Gap 10 


 


Blood Urea Nitrogen 5 mg/dL 


 


Creatinine 0.7 mg/dL 


 


Estimated GFR


(Cockcroft-Gault) 82.7 





 


Glucose Level 101 mg/dL 


 


Calcium Level 7.5 mg/dL 


 


Iron Level 12 ug/dL 


 


Total Iron Binding Capacity 265 ug/dL 


 


Iron Saturation 5 % 


 


Ferritin 67 ng/mL 





  BLOOD CULTURE  Preliminary  


        NO GROWTH AFTER 1 DAY





PE:





GEN: NAD, asks me to keep the door closed


HEENT: keeps eyes closed through majority of interview


LUNGS: CTAB anteriorly


HEART: RRR


ABD: NABS, S/ND/NT


NEURO/PSYCH: A & O 3, doesn't say much, very flat





A/P:


Abd pain, n/v, diarrhea - resolving


MARCELLE, fever - ASA and Plavix on hold


HCC, possible hemorrhage/abnormal CT


Hypokalemia - per primary





--


Try full liquids, ADAT.





Hemodynamically unstable?:  No


Is patient in severe pain?:  No


Is NPO status required?:  No











DAHIANA NAVA         Jan 28, 2020 11:18

## 2020-01-28 NOTE — PDOC
SURGICAL PROGRESS NOTE


Subjective


Pt with c/o LUQ pain, denies RUQ pain, hungry


Vital Signs





Vital Signs








  Date Time  Temp Pulse Resp B/P (MAP) Pulse Ox O2 Delivery O2 Flow Rate FiO2


 


1/28/20 07:51      Room Air  


 


1/28/20 07:00 98.1 97 16 136/62 (86) 96   





 98.1       








I&O











Intake and Output 


 


 1/28/20





 07:00


 


Intake Total 120 ml


 


Output Total 2300 ml


 


Balance -2180 ml


 


 


 


Intake Oral 120 ml


 


Output Urine Total 2300 ml








General:  Alert, Oriented X3, Cooperative, No acute distress


Abdomen:  Soft, Other (mild TTP LUQ)


Labs





Laboratory Tests








Test


 1/26/20


13:00 1/26/20


17:40 1/26/20


18:40 1/27/20


04:50


 


White Blood Count


 6.5 x10^3/uL


(4.0-11.0) 


 


 4.9 x10^3/uL


(4.0-11.0)


 


Red Blood Count


 3.39 x10^6/uL


(3.50-5.40) 


 


 3.21 x10^6/uL


(3.50-5.40)


 


Hemoglobin


 8.9 g/dL


(12.0-15.5) 


 


 8.4 g/dL


(12.0-15.5)


 


Hematocrit


 26.9 %


(36.0-47.0) 


 


 25.4 %


(36.0-47.0)


 


Mean Corpuscular Volume 79 fL ()    79 fL () 


 


Mean Corpuscular Hemoglobin 26 pg (25-35)    26 pg (25-35) 


 


Mean Corpuscular Hemoglobin


Concent 33 g/dL


(31-37) 


 


 33 g/dL


(31-37)


 


Red Cell Distribution Width


 16.8 %


(11.5-14.5) 


 


 16.9 %


(11.5-14.5)


 


Platelet Count


 249 x10^3/uL


(140-400) 


 


 217 x10^3/uL


(140-400)


 


Neutrophils (%) (Auto) 70 % (31-73)    69 % (31-73) 


 


Lymphocytes (%) (Auto) 20 % (24-48)    19 % (24-48) 


 


Monocytes (%) (Auto) 9 % (0-9)    10 % (0-9) 


 


Eosinophils (%) (Auto) 1 % (0-3)    0 % (0-3) 


 


Basophils (%) (Auto) 1 % (0-3)    1 % (0-3) 


 


Neutrophils # (Auto)


 4.6 x10^3/uL


(1.8-7.7) 


 


 3.4 x10^3/uL


(1.8-7.7)


 


Lymphocytes # (Auto)


 1.3 x10^3/uL


(1.0-4.8) 


 


 0.9 x10^3/uL


(1.0-4.8)


 


Monocytes # (Auto)


 0.6 x10^3/uL


(0.0-1.1) 


 


 0.5 x10^3/uL


(0.0-1.1)


 


Eosinophils # (Auto)


 0.0 x10^3/uL


(0.0-0.7) 


 


 0.0 x10^3/uL


(0.0-0.7)


 


Basophils # (Auto)


 0.1 x10^3/uL


(0.0-0.2) 


 


 0.0 x10^3/uL


(0.0-0.2)


 


Prothrombin Time


 13.7 SEC


(11.7-14.0) 


 


 





 


Prothromb Time International


Ratio 1.1 (0.8-1.1) 


 


 


 





 


Activated Partial


Thromboplast Time 26 SEC (24-38) 


 


 


 





 


Sodium Level


 139 mmol/L


(136-145) 


 


 141 mmol/L


(136-145)


 


Potassium Level


 3.0 mmol/L


(3.5-5.1) 


 


 3.1 mmol/L


(3.5-5.1)


 


Chloride Level


 103 mmol/L


() 


 


 106 mmol/L


()


 


Carbon Dioxide Level


 27 mmol/L


(21-32) 


 


 24 mmol/L


(21-32)


 


Anion Gap 9 (6-14)    11 (6-14) 


 


Blood Urea Nitrogen


 23 mg/dL


(7-20) 


 


 11 mg/dL


(7-20)


 


Creatinine


 1.1 mg/dL


(0.6-1.0) 


 


 0.8 mg/dL


(0.6-1.0)


 


Estimated GFR


(Cockcroft-Gault) 49.1 


 


 


 70.9 





 


BUN/Creatinine Ratio 21 (6-20)    14 (6-20) 


 


Glucose Level


 115 mg/dL


(70-99) 


 


 100 mg/dL


(70-99)


 


Calcium Level


 8.3 mg/dL


(8.5-10.1) 


 


 8.1 mg/dL


(8.5-10.1)


 


Magnesium Level


 1.9 mg/dL


(1.8-2.4) 


 


 1.6 mg/dL


(1.8-2.4)


 


Total Bilirubin


 0.5 mg/dL


(0.2-1.0) 


 


 0.6 mg/dL


(0.2-1.0)


 


Aspartate Amino Transf


(AST/SGOT) 717 U/L


(15-37) 


 


 526 U/L


(15-37)


 


Alanine Aminotransferase


(ALT/SGPT) 78 U/L (14-59) 


 


 


 65 U/L (14-59) 





 


Alkaline Phosphatase


 79 U/L


() 


 


 77 U/L


()


 


Creatine Kinase


 48 U/L


() 


 


 





 


Troponin I Quantitative


 0.058 ng/mL


(0.000-0.055) 0.066 ng/mL


(0.000-0.055) 0.049 ng/mL


(0.000-0.055) 





 


NT-Pro-B-Type Natriuretic


Peptide 313 pg/mL


(0-124) 


 


 





 


Total Protein


 6.3 g/dL


(6.4-8.2) 


 


 6.4 g/dL


(6.4-8.2)


 


Albumin


 2.7 g/dL


(3.4-5.0) 


 


 2.6 g/dL


(3.4-5.0)


 


Albumin/Globulin Ratio 0.8 (1.0-1.7)    0.7 (1.0-1.7) 


 


Lipase


 141 U/L


() 


 


 





 


Test


 1/27/20


06:20 1/28/20


03:10 


 





 


Urine Collection Type Unknown    


 


Urine Color Yellow    


 


Urine Clarity Clear    


 


Urine pH 6.5    


 


Urine Specific Gravity 1.010    


 


Urine Protein


 Negative mg/dL


(NEG-TRACE) 


 


 





 


Urine Glucose (UA)


 Negative mg/dL


(NEG) 


 


 





 


Urine Ketones (Stick)


 Trace mg/dL


(NEG) 


 


 





 


Urine Blood Negative (NEG)    


 


Urine Nitrite Negative (NEG)    


 


Urine Bilirubin Negative (NEG)    


 


Urine Urobilinogen Dipstick


 4.0 mg/dL (0.2


mg/dL) 


 


 





 


Urine Leukocyte Esterase Negative (NEG)    


 


Urine RBC 0 /HPF (0-2)    


 


Urine WBC 1-4 /HPF (0-4)    


 


Urine Squamous Epithelial


Cells None /LPF 


 


 


 





 


Urine Bacteria 0 /HPF (0-FEW)    


 


Urine Hyaline Casts Few /HPF    


 


Sodium Level


 


 141 mmol/L


(136-145) 


 





 


Potassium Level


 


 2.4 mmol/L


(3.5-5.1) 


 





 


Chloride Level


 


 108 mmol/L


() 


 





 


Carbon Dioxide Level


 


 23 mmol/L


(21-32) 


 





 


Anion Gap  10 (6-14)   


 


Blood Urea Nitrogen  5 mg/dL (7-20)   


 


Creatinine


 


 0.7 mg/dL


(0.6-1.0) 


 





 


Estimated GFR


(Cockcroft-Gault) 


 82.7 


 


 





 


Glucose Level


 


 101 mg/dL


(70-99) 


 





 


Calcium Level


 


 7.5 mg/dL


(8.5-10.1) 


 











Laboratory Tests








Test


 1/28/20


03:10


 


Sodium Level


 141 mmol/L


(136-145)


 


Potassium Level


 2.4 mmol/L


(3.5-5.1)


 


Chloride Level


 108 mmol/L


()


 


Carbon Dioxide Level


 23 mmol/L


(21-32)


 


Anion Gap 10 (6-14) 


 


Blood Urea Nitrogen 5 mg/dL (7-20) 


 


Creatinine


 0.7 mg/dL


(0.6-1.0)


 


Estimated GFR


(Cockcroft-Gault) 82.7 





 


Glucose Level


 101 mg/dL


(70-99)


 


Calcium Level


 7.5 mg/dL


(8.5-10.1)








Problem List


Problems


Medical Problems:


(1) Abdominal pain


Status: Acute  





(2) Anemia


Status: Acute  





(3) Elevated troponin


Status: Acute  





(4) Generalized weakness


Status: Acute  





(5) Hypoalbuminemia


Status: Acute  





(6) Liver cancer


Status: Acute  





(7) Nausea and vomiting


Status: Acute  





(8) Renal insufficiency


Status: Acute  








Assessment/Plan


no surgical plans, pt appears stable


OK to ADAT but will defer to GI.











EN STEVENS MD             Jan 28, 2020 08:47

## 2020-01-29 VITALS — SYSTOLIC BLOOD PRESSURE: 113 MMHG | DIASTOLIC BLOOD PRESSURE: 60 MMHG

## 2020-01-29 VITALS — DIASTOLIC BLOOD PRESSURE: 67 MMHG | SYSTOLIC BLOOD PRESSURE: 139 MMHG

## 2020-01-29 VITALS — DIASTOLIC BLOOD PRESSURE: 68 MMHG | SYSTOLIC BLOOD PRESSURE: 146 MMHG

## 2020-01-29 VITALS — DIASTOLIC BLOOD PRESSURE: 60 MMHG | SYSTOLIC BLOOD PRESSURE: 128 MMHG

## 2020-01-29 VITALS — SYSTOLIC BLOOD PRESSURE: 136 MMHG | DIASTOLIC BLOOD PRESSURE: 73 MMHG

## 2020-01-29 VITALS — SYSTOLIC BLOOD PRESSURE: 119 MMHG | DIASTOLIC BLOOD PRESSURE: 58 MMHG

## 2020-01-29 LAB
ALBUMIN SERPL-MCNC: 2 G/DL (ref 3.4–5)
ALBUMIN/GLOB SERPL: 0.6 {RATIO} (ref 1–1.7)
ALP SERPL-CCNC: 77 U/L (ref 46–116)
ALT SERPL-CCNC: 31 U/L (ref 14–59)
ANION GAP SERPL CALC-SCNC: 11 MMOL/L (ref 6–14)
AST SERPL-CCNC: 148 U/L (ref 15–37)
BASOPHILS # BLD AUTO: 0 X10^3/UL (ref 0–0.2)
BASOPHILS NFR BLD: 1 % (ref 0–3)
BILIRUB SERPL-MCNC: 0.4 MG/DL (ref 0.2–1)
BUN SERPL-MCNC: 4 MG/DL (ref 7–20)
BUN/CREAT SERPL: 6 (ref 6–20)
CALCIUM SERPL-MCNC: 7.7 MG/DL (ref 8.5–10.1)
CHLORIDE SERPL-SCNC: 108 MMOL/L (ref 98–107)
CO2 SERPL-SCNC: 24 MMOL/L (ref 21–32)
CREAT SERPL-MCNC: 0.7 MG/DL (ref 0.6–1)
EOSINOPHIL NFR BLD: 0 % (ref 0–3)
EOSINOPHIL NFR BLD: 0 X10^3/UL (ref 0–0.7)
ERYTHROCYTE [DISTWIDTH] IN BLOOD BY AUTOMATED COUNT: 17.1 % (ref 11.5–14.5)
GFR SERPLBLD BASED ON 1.73 SQ M-ARVRAT: 82.7 ML/MIN
GLOBULIN SER-MCNC: 3.5 G/DL (ref 2.2–3.8)
GLUCOSE SERPL-MCNC: 113 MG/DL (ref 70–99)
HCT VFR BLD CALC: 23.1 % (ref 36–47)
HGB BLD-MCNC: 7.6 G/DL (ref 12–15.5)
INFLUENZA A PATIENT: NEGATIVE
INFLUENZA B PATIENT: NEGATIVE
LYMPHOCYTES # BLD: 0.7 X10^3/UL (ref 1–4.8)
LYMPHOCYTES NFR BLD AUTO: 18 % (ref 24–48)
MCH RBC QN AUTO: 26 PG (ref 25–35)
MCHC RBC AUTO-ENTMCNC: 33 G/DL (ref 31–37)
MCV RBC AUTO: 79 FL (ref 79–100)
MONO #: 0.4 X10^3/UL (ref 0–1.1)
MONOCYTES NFR BLD: 11 % (ref 0–9)
NEUT #: 3 X10^3/UL (ref 1.8–7.7)
NEUTROPHILS NFR BLD AUTO: 71 % (ref 31–73)
PLATELET # BLD AUTO: 217 X10^3/UL (ref 140–400)
POTASSIUM SERPL-SCNC: 2.7 MMOL/L (ref 3.5–5.1)
PROT SERPL-MCNC: 5.5 G/DL (ref 6.4–8.2)
RBC # BLD AUTO: 2.94 X10^6/UL (ref 3.5–5.4)
SODIUM SERPL-SCNC: 143 MMOL/L (ref 136–145)
WBC # BLD AUTO: 4.3 X10^3/UL (ref 4–11)

## 2020-01-29 RX ADMIN — Medication SCH MLS/HR: at 11:59

## 2020-01-29 RX ADMIN — ACETAMINOPHEN PRN MG: 650 SOLUTION ORAL at 08:21

## 2020-01-29 RX ADMIN — DEXTROSE, SODIUM CHLORIDE, AND POTASSIUM CHLORIDE SCH MLS/HR: 5; .45; .22 INJECTION INTRAVENOUS at 12:27

## 2020-01-29 RX ADMIN — IPRATROPIUM BROMIDE AND ALBUTEROL SULFATE SCH ML: .5; 3 SOLUTION RESPIRATORY (INHALATION) at 07:56

## 2020-01-29 RX ADMIN — ACETAMINOPHEN SCH MG: 325 TABLET, FILM COATED ORAL at 10:30

## 2020-01-29 RX ADMIN — PIPERACILLIN SODIUM AND TAZOBACTAM SODIUM SCH MLS/HR: 3; .375 INJECTION, POWDER, LYOPHILIZED, FOR SOLUTION INTRAVENOUS at 23:36

## 2020-01-29 RX ADMIN — Medication SCH CAP: at 21:01

## 2020-01-29 RX ADMIN — POTASSIUM BICARBONATE SCH MEQ: 782 TABLET, EFFERVESCENT ORAL at 08:23

## 2020-01-29 RX ADMIN — PANTOPRAZOLE SODIUM SCH MG: 40 TABLET, DELAYED RELEASE ORAL at 08:24

## 2020-01-29 RX ADMIN — CARVEDILOL SCH MG: 3.12 TABLET, FILM COATED ORAL at 17:32

## 2020-01-29 RX ADMIN — Medication SCH CAP: at 08:24

## 2020-01-29 RX ADMIN — CARVEDILOL SCH MG: 3.12 TABLET, FILM COATED ORAL at 08:24

## 2020-01-29 RX ADMIN — DEXTROSE, SODIUM CHLORIDE, AND POTASSIUM CHLORIDE SCH MLS/HR: 5; .45; .22 INJECTION INTRAVENOUS at 17:44

## 2020-01-29 RX ADMIN — CITALOPRAM HYDROBROMIDE SCH MG: 20 TABLET ORAL at 08:24

## 2020-01-29 RX ADMIN — POTASSIUM BICARBONATE SCH MEQ: 782 TABLET, EFFERVESCENT ORAL at 17:32

## 2020-01-29 RX ADMIN — DEXTROSE, SODIUM CHLORIDE, AND POTASSIUM CHLORIDE SCH MLS/HR: 5; .45; .22 INJECTION INTRAVENOUS at 00:06

## 2020-01-29 RX ADMIN — ACETAMINOPHEN PRN MG: 650 SOLUTION ORAL at 17:35

## 2020-01-29 RX ADMIN — PIPERACILLIN SODIUM AND TAZOBACTAM SODIUM SCH MLS/HR: 3; .375 INJECTION, POWDER, LYOPHILIZED, FOR SOLUTION INTRAVENOUS at 17:33

## 2020-01-29 RX ADMIN — Medication SCH MG: at 12:00

## 2020-01-29 NOTE — NUR
SS following up with discharge planning. Pt accepted at [facility], [phone]; fax 
[phone], pending insurance authorization. SS will await insurance determination and 
will proceed accordingly with discharge planning.

## 2020-01-29 NOTE — PDOC
Subjective:


Subjective:


Didn't sleep well.


Says has been complaining about full liquid diet and wants something different.


Left-sided abd pain when hiccups.


No nausea.  Maybe yesterday incontinence of stool - "I was wet and she said 

there was a little."





Objective:


Vital Signs:





                                   Vital Signs








  Date Time  Temp Pulse Resp B/P (MAP) Pulse Ox O2 Delivery O2 Flow Rate FiO2


 


1/29/20 08:24  104  146/68    


 


1/29/20 07:58     96 Room Air  


 


1/29/20 07:52 102.3  16     





 102.3       








Labs:





Laboratory Tests








Test


 1/29/20


03:20


 


White Blood Count 4.3 x10^3/uL 


 


Red Blood Count 2.94 x10^6/uL 


 


Hemoglobin 7.6 g/dL 


 


Hematocrit 23.1 % 


 


Mean Corpuscular Volume 79 fL 


 


Mean Corpuscular Hemoglobin 26 pg 


 


Mean Corpuscular Hemoglobin


Concent 33 g/dL 





 


Red Cell Distribution Width 17.1 % 


 


Platelet Count 217 x10^3/uL 


 


Neutrophils (%) (Auto) 71 % 


 


Lymphocytes (%) (Auto) 18 % 


 


Monocytes (%) (Auto) 11 % 


 


Eosinophils (%) (Auto) 0 % 


 


Basophils (%) (Auto) 1 % 


 


Neutrophils # (Auto) 3.0 x10^3/uL 


 


Lymphocytes # (Auto) 0.7 x10^3/uL 


 


Monocytes # (Auto) 0.4 x10^3/uL 


 


Eosinophils # (Auto) 0.0 x10^3/uL 


 


Basophils # (Auto) 0.0 x10^3/uL 


 


Sodium Level 143 mmol/L 


 


Potassium Level 2.7 mmol/L 


 


Chloride Level 108 mmol/L 


 


Carbon Dioxide Level 24 mmol/L 


 


Anion Gap 11 


 


Blood Urea Nitrogen 4 mg/dL 


 


Creatinine 0.7 mg/dL 


 


Estimated GFR


(Cockcroft-Gault) 82.7 





 


BUN/Creatinine Ratio 6 


 


Glucose Level 113 mg/dL 


 


Calcium Level 7.7 mg/dL 


 


Total Bilirubin 0.4 mg/dL 


 


Aspartate Amino Transf


(AST/SGOT) 148 U/L 





 


Alanine Aminotransferase


(ALT/SGPT) 31 U/L 





 


Alkaline Phosphatase 77 U/L 


 


Total Protein 5.5 g/dL 


 


Albumin 2.0 g/dL 


 


Albumin/Globulin Ratio 0.6 





  BLOOD CULTURE  Preliminary  


        NO GROWTH AFTER 2 DAYS





PE:





GEN: NAD


LUNGS: CTAB


HEART: borderline tachycardic


ABD: NABS, S/ND/NT


NEURO/PSYCH: A & O 3, really flat





A/P:


Fever


Left-sided abd discomfort - ?MSK


Diarrhea - seems better, maybe incontinence yesterday


MARCELLE - Hgb to 7.6, heme/onc following


HCC, possible hemorrhage/abnormal CT


Hypokalemia - ongoing





--


Note plans for ID opinion.


Nurse offered a regular breakfast tray while I was there - the patient said she 

didn't want it (though just said she didn't want full liquids).  Might order fro

m a menu for lunch.


Add iron.





Hemodynamically unstable?:  No


Is patient in severe pain?:  No


Is NPO status required?:  No











DAHIANA NAVA         Jan 29, 2020 09:31

## 2020-01-29 NOTE — PDOC
PROGRESS NOTES


Chief Complaint


Chief Complaint


STage 4 HCC


Elev AFP


FEvers, intermittent


CLean UA


Hypokalemia corrected


Anemia


Renal insufficiency


Hypoalbuminemia


Weakness - SNU agreebale


Falls


Dehydration resolved


Debility





History of Present Illness


History of Present Illness


transferred out of ICU 1/28


SHe feels weak, agreeable to SNU which PT recommended


AFP elevated 300s, K 2.7 - rpt pending


On KCL IVF, fair pO 


BUT fevers overnight (since sunday she claims, intermittent)


ON rocephin per DR cabral (2 doses) - seems like was for empiric coverage


UA clean,


Cancer can cause fevers...





PLAN:


Await k, if still low will make adjustments


COnt kcl ivf for now


CONSULT ID re persistent intermittent fevers, on rocephin by DR cabral and has 

gotten 2 doses





SW aware of SNU needs


Full code


FF up [facility] onc as OP - chemo plans, stage 4 HCC


MAintain prater - inserted 1/27 bec of retention - UA so far clean


dw rn, sw and case mx





Vitals


Vitals





Vital Signs








  Date Time  Temp Pulse Resp B/P (MAP) Pulse Ox O2 Delivery O2 Flow Rate FiO2


 


1/29/20 11:07 97.6 88 16 113/60 (77) 94 Room Air  





 97.6       











Physical Exam


General:  Alert, Oriented X3, Cooperative, No acute distress


Heart:  Regular rate, Normal S1, Normal S2, Other (2/6 systolic murmur )


Lungs:  Clear


Abdomen:  Soft, No tenderness


Extremities:  No edema, Normal pulses


Skin:  No significant lesion





Labs


LABS





Laboratory Tests








Test


 1/29/20


03:20


 


White Blood Count


 4.3 x10^3/uL


(4.0-11.0)


 


Red Blood Count


 2.94 x10^6/uL


(3.50-5.40)


 


Hemoglobin


 7.6 g/dL


(12.0-15.5)


 


Hematocrit


 23.1 %


(36.0-47.0)


 


Mean Corpuscular Volume 79 fL () 


 


Mean Corpuscular Hemoglobin 26 pg (25-35) 


 


Mean Corpuscular Hemoglobin


Concent 33 g/dL


(31-37)


 


Red Cell Distribution Width


 17.1 %


(11.5-14.5)


 


Platelet Count


 217 x10^3/uL


(140-400)


 


Neutrophils (%) (Auto) 71 % (31-73) 


 


Lymphocytes (%) (Auto) 18 % (24-48) 


 


Monocytes (%) (Auto) 11 % (0-9) 


 


Eosinophils (%) (Auto) 0 % (0-3) 


 


Basophils (%) (Auto) 1 % (0-3) 


 


Neutrophils # (Auto)


 3.0 x10^3/uL


(1.8-7.7)


 


Lymphocytes # (Auto)


 0.7 x10^3/uL


(1.0-4.8)


 


Monocytes # (Auto)


 0.4 x10^3/uL


(0.0-1.1)


 


Eosinophils # (Auto)


 0.0 x10^3/uL


(0.0-0.7)


 


Basophils # (Auto)


 0.0 x10^3/uL


(0.0-0.2)


 


Sodium Level


 143 mmol/L


(136-145)


 


Potassium Level


 2.7 mmol/L


(3.5-5.1)


 


Chloride Level


 108 mmol/L


()


 


Carbon Dioxide Level


 24 mmol/L


(21-32)


 


Anion Gap 11 (6-14) 


 


Blood Urea Nitrogen 4 mg/dL (7-20) 


 


Creatinine


 0.7 mg/dL


(0.6-1.0)


 


Estimated GFR


(Cockcroft-Gault) 82.7 





 


BUN/Creatinine Ratio 6 (6-20) 


 


Glucose Level


 113 mg/dL


(70-99)


 


Calcium Level


 7.7 mg/dL


(8.5-10.1)


 


Total Bilirubin


 0.4 mg/dL


(0.2-1.0)


 


Aspartate Amino Transf


(AST/SGOT) 148 U/L


(15-37)


 


Alanine Aminotransferase


(ALT/SGPT) 31 U/L (14-59) 





 


Alkaline Phosphatase


 77 U/L


()


 


Total Protein


 5.5 g/dL


(6.4-8.2)


 


Albumin


 2.0 g/dL


(3.4-5.0)


 


Albumin/Globulin Ratio 0.6 (1.0-1.7) 











Review of Systems


Review of Systems


weak, fevers, all else neg 14 pt reviewed with her





Assessment and Plan


Assessmemt and Plan


Problems


Medical Problems:


(1) Abdominal pain


Status: Acute  





(2) Anemia


Status: Acute  





(3) Elevated troponin


Status: Acute  





(4) Generalized weakness


Status: Acute  





(5) Hypoalbuminemia


Status: Acute  





(6) Liver cancer


Status: Acute  





(7) Nausea and vomiting


Status: Acute  





(8) Renal insufficiency


Status: Acute  











Comment


Review of Relevant


I have reviewed the following items ignacio (where applicable) has been applied.


Labs





Laboratory Tests








Test


 1/28/20


03:10 1/29/20


03:20


 


Sodium Level


 141 mmol/L


(136-145) 143 mmol/L


(136-145)


 


Potassium Level


 2.4 mmol/L


(3.5-5.1) 2.7 mmol/L


(3.5-5.1)


 


Chloride Level


 108 mmol/L


() 108 mmol/L


()


 


Carbon Dioxide Level


 23 mmol/L


(21-32) 24 mmol/L


(21-32)


 


Anion Gap 10 (6-14)  11 (6-14) 


 


Blood Urea Nitrogen 5 mg/dL (7-20)  4 mg/dL (7-20) 


 


Creatinine


 0.7 mg/dL


(0.6-1.0) 0.7 mg/dL


(0.6-1.0)


 


Estimated GFR


(Cockcroft-Gault) 82.7 


 82.7 





 


Glucose Level


 101 mg/dL


(70-99) 113 mg/dL


(70-99)


 


Calcium Level


 7.5 mg/dL


(8.5-10.1) 7.7 mg/dL


(8.5-10.1)


 


Iron Level


 12 ug/dL


() 





 


Total Iron Binding Capacity


 265 ug/dL


(250-450) 





 


Iron Saturation 5 % (15-34)  


 


Ferritin


 67 ng/mL


(8-252) 





 


White Blood Count


 


 4.3 x10^3/uL


(4.0-11.0)


 


Red Blood Count


 


 2.94 x10^6/uL


(3.50-5.40)


 


Hemoglobin


 


 7.6 g/dL


(12.0-15.5)


 


Hematocrit


 


 23.1 %


(36.0-47.0)


 


Mean Corpuscular Volume  79 fL () 


 


Mean Corpuscular Hemoglobin  26 pg (25-35) 


 


Mean Corpuscular Hemoglobin


Concent 


 33 g/dL


(31-37)


 


Red Cell Distribution Width


 


 17.1 %


(11.5-14.5)


 


Platelet Count


 


 217 x10^3/uL


(140-400)


 


Neutrophils (%) (Auto)  71 % (31-73) 


 


Lymphocytes (%) (Auto)  18 % (24-48) 


 


Monocytes (%) (Auto)  11 % (0-9) 


 


Eosinophils (%) (Auto)  0 % (0-3) 


 


Basophils (%) (Auto)  1 % (0-3) 


 


Neutrophils # (Auto)


 


 3.0 x10^3/uL


(1.8-7.7)


 


Lymphocytes # (Auto)


 


 0.7 x10^3/uL


(1.0-4.8)


 


Monocytes # (Auto)


 


 0.4 x10^3/uL


(0.0-1.1)


 


Eosinophils # (Auto)


 


 0.0 x10^3/uL


(0.0-0.7)


 


Basophils # (Auto)


 


 0.0 x10^3/uL


(0.0-0.2)


 


BUN/Creatinine Ratio  6 (6-20) 


 


Total Bilirubin


 


 0.4 mg/dL


(0.2-1.0)


 


Aspartate Amino Transf


(AST/SGOT) 


 148 U/L


(15-37)


 


Alanine Aminotransferase


(ALT/SGPT) 


 31 U/L (14-59) 





 


Alkaline Phosphatase


 


 77 U/L


()


 


Total Protein


 


 5.5 g/dL


(6.4-8.2)


 


Albumin


 


 2.0 g/dL


(3.4-5.0)


 


Albumin/Globulin Ratio  0.6 (1.0-1.7) 








Laboratory Tests








Test


 1/29/20


03:20


 


White Blood Count


 4.3 x10^3/uL


(4.0-11.0)


 


Red Blood Count


 2.94 x10^6/uL


(3.50-5.40)


 


Hemoglobin


 7.6 g/dL


(12.0-15.5)


 


Hematocrit


 23.1 %


(36.0-47.0)


 


Mean Corpuscular Volume 79 fL () 


 


Mean Corpuscular Hemoglobin 26 pg (25-35) 


 


Mean Corpuscular Hemoglobin


Concent 33 g/dL


(31-37)


 


Red Cell Distribution Width


 17.1 %


(11.5-14.5)


 


Platelet Count


 217 x10^3/uL


(140-400)


 


Neutrophils (%) (Auto) 71 % (31-73) 


 


Lymphocytes (%) (Auto) 18 % (24-48) 


 


Monocytes (%) (Auto) 11 % (0-9) 


 


Eosinophils (%) (Auto) 0 % (0-3) 


 


Basophils (%) (Auto) 1 % (0-3) 


 


Neutrophils # (Auto)


 3.0 x10^3/uL


(1.8-7.7)


 


Lymphocytes # (Auto)


 0.7 x10^3/uL


(1.0-4.8)


 


Monocytes # (Auto)


 0.4 x10^3/uL


(0.0-1.1)


 


Eosinophils # (Auto)


 0.0 x10^3/uL


(0.0-0.7)


 


Basophils # (Auto)


 0.0 x10^3/uL


(0.0-0.2)


 


Sodium Level


 143 mmol/L


(136-145)


 


Potassium Level


 2.7 mmol/L


(3.5-5.1)


 


Chloride Level


 108 mmol/L


()


 


Carbon Dioxide Level


 24 mmol/L


(21-32)


 


Anion Gap 11 (6-14) 


 


Blood Urea Nitrogen 4 mg/dL (7-20) 


 


Creatinine


 0.7 mg/dL


(0.6-1.0)


 


Estimated GFR


(Cockcroft-Gault) 82.7 





 


BUN/Creatinine Ratio 6 (6-20) 


 


Glucose Level


 113 mg/dL


(70-99)


 


Calcium Level


 7.7 mg/dL


(8.5-10.1)


 


Total Bilirubin


 0.4 mg/dL


(0.2-1.0)


 


Aspartate Amino Transf


(AST/SGOT) 148 U/L


(15-37)


 


Alanine Aminotransferase


(ALT/SGPT) 31 U/L (14-59) 





 


Alkaline Phosphatase


 77 U/L


()


 


Total Protein


 5.5 g/dL


(6.4-8.2)


 


Albumin


 2.0 g/dL


(3.4-5.0)


 


Albumin/Globulin Ratio 0.6 (1.0-1.7) 








Microbiology


1/26/20 Blood Culture - Preliminary, Resulted


          NO GROWTH AFTER 2 DAYS


Medications





Current Medications


Sodium Chloride 1,000 ml @  1,000 mls/hr Q1H IV  Last administered on 1/26/20at 

12:51;  Start 1/26/20 at 12:51;  Stop 1/26/20 at 13:50;  Status DC


Ondansetron HCl (Zofran) 4 mg 1X  ONCE IV ;  Start 1/26/20 at 13:00;  Stop 

1/26/20 at 13:01;  Status DC


Sodium Chloride (Normal Saline Flush) 3 ml QSHIFT  PRN IV AFTER MEDS AND BLOOD 

DRAWS;  Start 1/26/20 at 14:45


Sodium Chloride 1,000 ml @  100 mls/hr Q10H IV  Last administered on 1/28/20at 

07:51;  Start 1/26/20 at 14:40;  Stop 1/28/20 at 11:38;  Status DC


Ondansetron HCl (Zofran) 4 mg PRN Q4HRS  PRN IV NAUSEA/VOMITING;  Start 1/26/20 

at 14:45


Acetaminophen (Tylenol) 650 mg PRN Q4HRS  PRN GT TEMP OVER 100.4F OR MILD PAIN 

Last administered on 1/29/20at 08:21;  Start 1/26/20 at 14:45


Clonidine HCl (Catapres) 0.1 mg PRN Q6HRS  PRN PO SBP>160 OR DBP>90;  Start 

1/26/20 at 14:45


Docusate Sodium (Colace) 100 mg PRN BID  PRN PO CONSTIPATION;  Start 1/26/20 at 

14:45


Albuterol/ Ipratropium (Duoneb) 3 ml Q4HRS NEB  Last administered on 1/28/20at 

07:51;  Start 1/26/20 at 16:00;  Stop 1/28/20 at 13:31;  Status DC


Guaifenesin (Robitussin) 200 mg PRN Q4HRS  PRN PO COUGH;  Start 1/26/20 at 14:45


Lorazepam (Ativan) 0.5 mg PRN Q4HRS  PRN PO ANXIETY / AGITATION;  Start 1/26/20 

at 14:45;  Stop 1/28/20 at 11:18;  Status DC


Enoxaparin Sodium (Lovenox 40mg Syringe) 40 mg Q24H SQ  Last administered on 

1/27/20at 23:04;  Start 1/26/20 at 21:00;  Stop 1/28/20 at 11:38;  Status DC


Potassium Chloride/Water 100 ml @  100 mls/hr Q1H IV ;  Start 1/26/20 at 14:45; 

Stop 1/26/20 at 14:49;  Status DC


Alprazolam (Xanax) 0.5 mg PRN BID  PRN PO ANXIETY / AGITATION (SEVERE) Last 

administered on 1/27/20at 23:06;  Start 1/26/20 at 21:00


Amlodipine Besylate (Norvasc) 5 mg DAILY PO  Last administered on 1/29/20at 

08:24;  Start 1/26/20 at 16:00


Carvedilol (Coreg) 3.125 mg BIDWMEALS PO  Last administered on 1/29/20at 08:24; 

Start 1/26/20 at 17:00


Citalopram Hydrobromide (CeleXA) 20 mg DAILY PO  Last administered on 1/29/20at 

08:24;  Start 1/26/20 at 16:00


Lactobacillus Rhamnosus (Culturelle) 1 cap BID PO  Last administered on 

1/29/20at 08:24;  Start 1/26/20 at 21:00


Potassium Chloride (Klor-Con) 20 meq DAILYWBKFT PO  Last administered on 

1/28/20at 08:48;  Start 1/26/20 at 17:00;  Stop 1/28/20 at 11:38;  Status DC


Sodium Chloride 1,000 ml @  125 mls/hr Q8H IV ;  Start 1/26/20 at 14:46;  Stop 

1/26/20 at 15:09;  Status DC


Potassium Chloride/Water 100 ml @  100 mls/hr Q1H IV  Last administered on 

1/26/20at 23:07;  Start 1/26/20 at 16:00;  Stop 1/26/20 at 19:59;  Status DC


Alprazolam (Xanax) 0.25 mg PRN BID  PRN PO ANXIETY / AGITATION (MODERATE);  

Start 1/26/20 at 15:00


Pantoprazole Sodium (PROTONIX VIAL for IV PUSH) 40 mg DAILYAC IVP  Last 

administered on 1/28/20at 07:51;  Start 1/27/20 at 07:30;  Stop 1/28/20 at 

11:38;  Status DC


Potassium Bicarbonate (Potassium Effervescent Tablet) 40 meq 1X  ONCE PEG  Last 

administered on 1/27/20at 09:43;  Start 1/27/20 at 08:45;  Stop 1/27/20 at 

08:46;  Status DC


Ceftriaxone Sodium (Rocephin) 1 gm Q24H IVP  Last administered on 1/28/20at 

21:08;  Start 1/27/20 at 22:30


Potassium Chloride (Klor-Con) 40 meq 1X  ONCE PO  Last administered on 1/28/20at

07:37;  Start 1/28/20 at 06:30;  Stop 1/28/20 at 06:31;  Status DC


Potassium Chloride (Klor-Con) 40 meq DAILYWBKFT PO  Last administered on 

1/28/20at 14:07;  Start 1/28/20 at 11:45;  Stop 1/29/20 at 05:52;  Status DC


Potassium Chloride/Dextrose/ Sod Cl 1,000 ml @  75 mls/hr [id number] IV  Last 

administered on 1/29/20at 00:06;  Start 1/28/20 at 12:00


Pantoprazole Sodium (Protonix) 40 mg DAILYAC PO  Last administered on 1/29/20at 

08:24;  Start 1/29/20 at 07:30


Albuterol/ Ipratropium (Duoneb) 3 ml DAILY08 NEB  Last administered on 1/29/20at

07:56;  Start 1/29/20 at 08:00


Albuterol Sulfate (Ventolin Neb Soln) 2.5 mg PRN Q4HRS  PRN NEB SHORTNESS OF 

BREATH;  Start 1/28/20 at 13:30


Enoxaparin Sodium (Lovenox 40mg Syringe) 40 mg Q24H SQ  Last administered on 

1/28/20at 16:29;  Start 1/28/20 at 15:00;  Stop 1/29/20 at 07:40;  Status DC


Potassium Bicarbonate (Potassium Effervescent Tablet) 40 meq BIDWMEALS PO  Last 

administered on 1/29/20at 08:23;  Start 1/29/20 at 08:00


Potassium Chloride (Klor-Con) 40 meq 1X  ONCE PO ;  Start 1/29/20 at 06:00;  

Stop 1/29/20 at 05:58;  Status DC


Potassium Bicarbonate (Potassium Effervescent Tablet) 40 meq 1X  ONCE PO  Last 

administered on 1/29/20at 06:05;  Start 1/29/20 at 06:00;  Stop 1/29/20 at 

06:01;  Status DC


Ferrous Sulfate (Feosol) 325 mg DAILYWBKFT PO ;  Start 1/29/20 at 12:00


Iron Sucrose 300 mg/Sodium Chloride 265 ml @  88.333 mls/ hr DAILY@1100 IV ;  

Start 1/29/20 at 11:00;  Stop 1/31/20 at 13:59


Diphenhydramine HCl (Benadryl) 25 mg DAILY@1030 PO ;  Start 1/29/20 at 10:30;  

Stop 1/31/20 at 10:31


Acetaminophen (Tylenol) 650 mg DAILY@1030 PO ;  Start 1/29/20 at 10:30;  Stop 

1/31/20 at 10:31





Active Scripts


Active


Culturelle (Lactobacillus Rhamnosus Gg) 1 Each Cap.sprink 1 Cap PO BID 14 Days


Colace (Docusate Sodium) 100 Mg Capsule 100 Mg PO PRN BID PRN 14 Days


Klor-Con M20 (Potassium Chloride) 20 Meq Tab.er.prt 20 Meq PO DAILYWBKFT 10 Days


Aspirin Ec (Aspirin) 325 Mg Tablet.dr 325 Mg PO DAILYWBKFT 30 Days


Amlodipine Besylate 5 Mg Tablet 5 Mg PO DAILY 30 Days


Reported


Citalopram Hbr (Citalopram Hydrobromide) 20 Mg Tablet 1 Tab PO DAILY


[atrovent hfa]   17 Mcg  QID


Xanax (Alprazolam) 0.5 Mg Tablet 0.5-1 Tab PO BID


Coreg  ** (Carvedilol) 3.125 Mg Tablet 1 Tab PO BID


Nexium Capsule (Esomeprazole Magnesium) 40 Mg Capsule.dr 40 Mg PO DAILYAC


Atorvastatin Calcium 40 Mg Tablet 40 Mg PO HS


Vitals/I & O





Vital Sign - Last 24 Hours








 1/28/20 1/28/20 1/28/20 1/28/20





 15:00 18:03 19:00 20:07


 


Temp 98.1  99.3 





 98.1  99.3 


 


Pulse 94 94 88 


 


Resp 16  16 


 


B/P (MAP) 119/66 (83) 119/66 120/59 (79) 


 


Pulse Ox 98  95 


 


O2 Delivery Room Air  Room Air Room Air


 


    





    





 1/28/20 1/29/20 1/29/20 1/29/20





 23:00 03:00 07:52 07:58


 


Temp 100.5 99.3 102.3 





 100.5 99.3 102.3 


 


Pulse 90 89 104 


 


Resp 18 18 16 


 


B/P (MAP) 128/63 (84) 139/67 (91) 146/68 (94) 


 


Pulse Ox 92 93 95 96


 


O2 Delivery Room Air Room Air Room Air Room Air


 


    





    





 1/29/20 1/29/20 1/29/20 





 08:24 08:24 11:07 


 


Temp   97.6 





   97.6 


 


Pulse 104 104 88 


 


Resp   16 


 


B/P (MAP) 146/68 146/68 113/60 (77) 


 


Pulse Ox   94 


 


O2 Delivery   Room Air 














Intake and Output   


 


 1/28/20 1/28/20 1/29/20





 14:59 22:59 06:59


 


Intake Total   200 ml


 


Output Total  1850 ml 1150 ml


 


Balance  -1850 ml -950 ml











Nutrition Consultation


Dietary Evaluation:


Recommendations by RD:  Dietary education by RD, Increase Calorie Intake, 

Protein supplementation


Comments:  


REC cardiac w/oral supplements





REC Ensure clear q day


Expected Outcomes/Goals:  


diet advancement- met





new goal: to meet >75% est nutr needs





Malnutrition Findings:


Food and Nutrition Intake (Sev:  <50% est energy req 5days


Weight Status:  Appropriate





Hemodynamically unstable?:  No


Is patient in severe pain?:  No


Is NPO status required?:  PUNEET Butt MD           Jan 29, 2020 11:32

## 2020-01-29 NOTE — PDOC
SURGICAL PROGRESS NOTE


Subjective


Pt with c/o mild intermittent LUQ pain, real diet


Vital Signs





Vital Signs








  Date Time  Temp Pulse Resp B/P (MAP) Pulse Ox O2 Delivery O2 Flow Rate FiO2


 


1/29/20 08:24  104  146/68    


 


1/29/20 07:58     96 Room Air  


 


1/29/20 07:52 102.3  16     





 102.3       








I&O











Intake and Output 


 


 1/29/20





 07:00


 


Intake Total 200 ml


 


Output Total 3000 ml


 


Balance -2800 ml


 


 


 


Intake Oral 200 ml


 


Output Urine Total 3000 ml


 


# Voids 2








General:  Alert, Oriented X3, Cooperative, No acute distress


Abdomen:  Soft, No tenderness


Labs





Laboratory Tests








Test


 1/28/20


03:10 1/29/20


03:20


 


Sodium Level


 141 mmol/L


(136-145) 143 mmol/L


(136-145)


 


Potassium Level


 2.4 mmol/L


(3.5-5.1) 2.7 mmol/L


(3.5-5.1)


 


Chloride Level


 108 mmol/L


() 108 mmol/L


()


 


Carbon Dioxide Level


 23 mmol/L


(21-32) 24 mmol/L


(21-32)


 


Anion Gap 10 (6-14)  11 (6-14) 


 


Blood Urea Nitrogen 5 mg/dL (7-20)  4 mg/dL (7-20) 


 


Creatinine


 0.7 mg/dL


(0.6-1.0) 0.7 mg/dL


(0.6-1.0)


 


Estimated GFR


(Cockcroft-Gault) 82.7 


 82.7 





 


Glucose Level


 101 mg/dL


(70-99) 113 mg/dL


(70-99)


 


Calcium Level


 7.5 mg/dL


(8.5-10.1) 7.7 mg/dL


(8.5-10.1)


 


Iron Level


 12 ug/dL


() 





 


Total Iron Binding Capacity


 265 ug/dL


(250-450) 





 


Iron Saturation 5 % (15-34)  


 


Ferritin


 67 ng/mL


(8-252) 





 


White Blood Count


 


 4.3 x10^3/uL


(4.0-11.0)


 


Red Blood Count


 


 2.94 x10^6/uL


(3.50-5.40)


 


Hemoglobin


 


 7.6 g/dL


(12.0-15.5)


 


Hematocrit


 


 23.1 %


(36.0-47.0)


 


Mean Corpuscular Volume  79 fL () 


 


Mean Corpuscular Hemoglobin  26 pg (25-35) 


 


Mean Corpuscular Hemoglobin


Concent 


 33 g/dL


(31-37)


 


Red Cell Distribution Width


 


 17.1 %


(11.5-14.5)


 


Platelet Count


 


 217 x10^3/uL


(140-400)


 


Neutrophils (%) (Auto)  71 % (31-73) 


 


Lymphocytes (%) (Auto)  18 % (24-48) 


 


Monocytes (%) (Auto)  11 % (0-9) 


 


Eosinophils (%) (Auto)  0 % (0-3) 


 


Basophils (%) (Auto)  1 % (0-3) 


 


Neutrophils # (Auto)


 


 3.0 x10^3/uL


(1.8-7.7)


 


Lymphocytes # (Auto)


 


 0.7 x10^3/uL


(1.0-4.8)


 


Monocytes # (Auto)


 


 0.4 x10^3/uL


(0.0-1.1)


 


Eosinophils # (Auto)


 


 0.0 x10^3/uL


(0.0-0.7)


 


Basophils # (Auto)


 


 0.0 x10^3/uL


(0.0-0.2)


 


BUN/Creatinine Ratio  6 (6-20) 


 


Total Bilirubin


 


 0.4 mg/dL


(0.2-1.0)


 


Aspartate Amino Transf


(AST/SGOT) 


 148 U/L


(15-37)


 


Alanine Aminotransferase


(ALT/SGPT) 


 31 U/L (14-59) 





 


Alkaline Phosphatase


 


 77 U/L


()


 


Total Protein


 


 5.5 g/dL


(6.4-8.2)


 


Albumin


 


 2.0 g/dL


(3.4-5.0)


 


Albumin/Globulin Ratio  0.6 (1.0-1.7) 








Laboratory Tests








Test


 1/29/20


03:20


 


White Blood Count


 4.3 x10^3/uL


(4.0-11.0)


 


Red Blood Count


 2.94 x10^6/uL


(3.50-5.40)


 


Hemoglobin


 7.6 g/dL


(12.0-15.5)


 


Hematocrit


 23.1 %


(36.0-47.0)


 


Mean Corpuscular Volume 79 fL () 


 


Mean Corpuscular Hemoglobin 26 pg (25-35) 


 


Mean Corpuscular Hemoglobin


Concent 33 g/dL


(31-37)


 


Red Cell Distribution Width


 17.1 %


(11.5-14.5)


 


Platelet Count


 217 x10^3/uL


(140-400)


 


Neutrophils (%) (Auto) 71 % (31-73) 


 


Lymphocytes (%) (Auto) 18 % (24-48) 


 


Monocytes (%) (Auto) 11 % (0-9) 


 


Eosinophils (%) (Auto) 0 % (0-3) 


 


Basophils (%) (Auto) 1 % (0-3) 


 


Neutrophils # (Auto)


 3.0 x10^3/uL


(1.8-7.7)


 


Lymphocytes # (Auto)


 0.7 x10^3/uL


(1.0-4.8)


 


Monocytes # (Auto)


 0.4 x10^3/uL


(0.0-1.1)


 


Eosinophils # (Auto)


 0.0 x10^3/uL


(0.0-0.7)


 


Basophils # (Auto)


 0.0 x10^3/uL


(0.0-0.2)


 


Sodium Level


 143 mmol/L


(136-145)


 


Potassium Level


 2.7 mmol/L


(3.5-5.1)


 


Chloride Level


 108 mmol/L


()


 


Carbon Dioxide Level


 24 mmol/L


(21-32)


 


Anion Gap 11 (6-14) 


 


Blood Urea Nitrogen 4 mg/dL (7-20) 


 


Creatinine


 0.7 mg/dL


(0.6-1.0)


 


Estimated GFR


(Cockcroft-Gault) 82.7 





 


BUN/Creatinine Ratio 6 (6-20) 


 


Glucose Level


 113 mg/dL


(70-99)


 


Calcium Level


 7.7 mg/dL


(8.5-10.1)


 


Total Bilirubin


 0.4 mg/dL


(0.2-1.0)


 


Aspartate Amino Transf


(AST/SGOT) 148 U/L


(15-37)


 


Alanine Aminotransferase


(ALT/SGPT) 31 U/L (14-59) 





 


Alkaline Phosphatase


 77 U/L


()


 


Total Protein


 5.5 g/dL


(6.4-8.2)


 


Albumin


 2.0 g/dL


(3.4-5.0)


 


Albumin/Globulin Ratio 0.6 (1.0-1.7) 








Problem List


Problems


Medical Problems:


(1) Abdominal pain


Status: Acute  





(2) Anemia


Status: Acute  





(3) Elevated troponin


Status: Acute  





(4) Generalized weakness


Status: Acute  





(5) Hypoalbuminemia


Status: Acute  





(6) Liver cancer


Status: Acute  





(7) Nausea and vomiting


Status: Acute  





(8) Renal insufficiency


Status: Acute  








Assessment/Plan


liver mass


appears stable


agree with plans per oncology


no surgical plan currently.











EN STEVENS MD             Jan 29, 2020 11:04

## 2020-01-29 NOTE — PDOC
SUBJECTIVE


Subjective


S: still occas ab pain, this am LLQ w/ sneezing, no BM, fever curve improving





O: Gen: NAD, resting in chair, Tm 100.5


Ext: no c/c/e


Psych: pleasant mood and affect





Labs: Hb down to 7.6, ferr 67, sat of 5%, (Hb had been 12 in Aug)





A/P: She is a 69-year-old female with history of hepatocellular carcinoma 

locally advanced status post Y 90 last September who is admitted with nausea and

vomiting and frequent stools and is improved but currently has had fever.





Fever: On antibiotics, defer to primary





History of stroke and prior aspirin and Plavix prescribed, has not been on 

these, currently held





Elevated troponin: Improving, per cardiology





Elevated creatinine: Improved with fluids





Hepatocellular carcinoma: If performance status improved could consider po 

chemotherapy in follow-up





Concern for hemorrhage abdominally: Abdominal exam appears benign, i have held 

Lovenox and will give IV Fe (can finish as outpt prn), not a surgical candidate 

currently





Electrolytes: Per primary 





disposition: After clinical improvement, may need rehabilitation? Appreciate 

multidisciplinary care





Thank you kindly, and please do not hesitate to call with any further questions,

i will return on Friday but am avail in the interim via phone.





OBJECTIVE


Vital Signs





Vital Signs








  Date Time  Temp Pulse Resp B/P (MAP) Pulse Ox O2 Delivery O2 Flow Rate FiO2


 


1/29/20 08:24  104  146/68    


 


1/29/20 08:24  104  146/68    


 


1/29/20 07:58     96 Room Air  


 


1/29/20 07:52 102.3 104 16 146/68 (94) 95 Room Air  





 102.3       


 


1/29/20 03:00 99.3 89 18 139/67 (91) 93 Room Air  





 99.3       


 


1/28/20 23:00 100.5 90 18 128/63 (84) 92 Room Air  





 100.5       


 


1/28/20 20:07      Room Air  


 


1/28/20 19:00 99.3 88 16 120/59 (79) 95 Room Air  





 99.3       


 


1/28/20 18:03  94  119/66    


 


1/28/20 15:00 98.1 94 16 119/66 (83) 98 Room Air  





 98.1       


 


1/28/20 11:00 97.8 85 18 112/56 (74) 96 Room Air  





 97.8       








I & O











Intake and Output 


 


 1/29/20





 07:00


 


Intake Total 200 ml


 


Output Total 3000 ml


 


Balance -2800 ml


 


 


 


Intake Oral 200 ml


 


Output Urine Total 3000 ml


 


# Voids 2











COMMENT


Lab





Laboratory Tests








Test


 1/29/20


03:20


 


White Blood Count


 4.3 x10^3/uL


(4.0-11.0)


 


Red Blood Count


 2.94 x10^6/uL


(3.50-5.40)


 


Hemoglobin


 7.6 g/dL


(12.0-15.5)


 


Hematocrit


 23.1 %


(36.0-47.0)


 


Mean Corpuscular Volume 79 fL () 


 


Mean Corpuscular Hemoglobin 26 pg (25-35) 


 


Mean Corpuscular Hemoglobin


Concent 33 g/dL


(31-37)


 


Red Cell Distribution Width


 17.1 %


(11.5-14.5)


 


Platelet Count


 217 x10^3/uL


(140-400)


 


Neutrophils (%) (Auto) 71 % (31-73) 


 


Lymphocytes (%) (Auto) 18 % (24-48) 


 


Monocytes (%) (Auto) 11 % (0-9) 


 


Eosinophils (%) (Auto) 0 % (0-3) 


 


Basophils (%) (Auto) 1 % (0-3) 


 


Neutrophils # (Auto)


 3.0 x10^3/uL


(1.8-7.7)


 


Lymphocytes # (Auto)


 0.7 x10^3/uL


(1.0-4.8)


 


Monocytes # (Auto)


 0.4 x10^3/uL


(0.0-1.1)


 


Eosinophils # (Auto)


 0.0 x10^3/uL


(0.0-0.7)


 


Basophils # (Auto)


 0.0 x10^3/uL


(0.0-0.2)


 


Sodium Level


 143 mmol/L


(136-145)


 


Potassium Level


 2.7 mmol/L


(3.5-5.1)


 


Chloride Level


 108 mmol/L


()


 


Carbon Dioxide Level


 24 mmol/L


(21-32)


 


Anion Gap 11 (6-14) 


 


Blood Urea Nitrogen 4 mg/dL (7-20) 


 


Creatinine


 0.7 mg/dL


(0.6-1.0)


 


Estimated GFR


(Cockcroft-Gault) 82.7 





 


BUN/Creatinine Ratio 6 (6-20) 


 


Glucose Level


 113 mg/dL


(70-99)


 


Calcium Level


 7.7 mg/dL


(8.5-10.1)


 


Total Bilirubin


 0.4 mg/dL


(0.2-1.0)


 


Aspartate Amino Transf


(AST/SGOT) 148 U/L


(15-37)


 


Alanine Aminotransferase


(ALT/SGPT) 31 U/L (14-59) 





 


Alkaline Phosphatase


 77 U/L


()


 


Total Protein


 5.5 g/dL


(6.4-8.2)


 


Albumin


 2.0 g/dL


(3.4-5.0)


 


Albumin/Globulin Ratio 0.6 (1.0-1.7) 











Nutrition Consultation


Dietary Evaluation:


Recommendations by RD:  Dietary education by RD, Increase Calorie Intake, 

Protein supplementation


Comments:  


REC cardiac w/oral supplements





REC Ensure clear q day


Expected Outcomes/Goals:  


diet advancement- met





new goal: to meet >75% est nutr needs





Malnutrition Findings:


Food and Nutrition Intake (Sev:  <50% est energy req 5days


Weight Status:  Appropriate





Hemodynamically unstable?:  No


Is patient in severe pain?:  No


Is NPO status required?:  No











NATALIA SCHULTZ MD           Jan 29, 2020 09:55

## 2020-01-30 VITALS — SYSTOLIC BLOOD PRESSURE: 124 MMHG | DIASTOLIC BLOOD PRESSURE: 64 MMHG

## 2020-01-30 VITALS — DIASTOLIC BLOOD PRESSURE: 73 MMHG | SYSTOLIC BLOOD PRESSURE: 139 MMHG

## 2020-01-30 VITALS — SYSTOLIC BLOOD PRESSURE: 136 MMHG | DIASTOLIC BLOOD PRESSURE: 62 MMHG

## 2020-01-30 VITALS — SYSTOLIC BLOOD PRESSURE: 131 MMHG | DIASTOLIC BLOOD PRESSURE: 68 MMHG

## 2020-01-30 VITALS — SYSTOLIC BLOOD PRESSURE: 127 MMHG | DIASTOLIC BLOOD PRESSURE: 70 MMHG

## 2020-01-30 VITALS — DIASTOLIC BLOOD PRESSURE: 66 MMHG | SYSTOLIC BLOOD PRESSURE: 125 MMHG

## 2020-01-30 LAB
ALBUMIN SERPL-MCNC: 1.9 G/DL (ref 3.4–5)
ALBUMIN/GLOB SERPL: 0.5 {RATIO} (ref 1–1.7)
ALP SERPL-CCNC: 100 U/L (ref 46–116)
ALT SERPL-CCNC: 36 U/L (ref 14–59)
ANION GAP SERPL CALC-SCNC: 9 MMOL/L (ref 6–14)
AST SERPL-CCNC: 117 U/L (ref 15–37)
BASOPHILS # BLD AUTO: 0 X10^3/UL (ref 0–0.2)
BASOPHILS NFR BLD: 0 % (ref 0–3)
BILIRUB SERPL-MCNC: 0.7 MG/DL (ref 0.2–1)
BUN SERPL-MCNC: 5 MG/DL (ref 7–20)
BUN/CREAT SERPL: 8 (ref 6–20)
CALCIUM SERPL-MCNC: 8 MG/DL (ref 8.5–10.1)
CHLORIDE SERPL-SCNC: 108 MMOL/L (ref 98–107)
CO2 SERPL-SCNC: 25 MMOL/L (ref 21–32)
CREAT SERPL-MCNC: 0.6 MG/DL (ref 0.6–1)
EOSINOPHIL NFR BLD: 0 X10^3/UL (ref 0–0.7)
EOSINOPHIL NFR BLD: 1 % (ref 0–3)
ERYTHROCYTE [DISTWIDTH] IN BLOOD BY AUTOMATED COUNT: 17.3 % (ref 11.5–14.5)
GFR SERPLBLD BASED ON 1.73 SQ M-ARVRAT: 98.8 ML/MIN
GLOBULIN SER-MCNC: 3.8 G/DL (ref 2.2–3.8)
GLUCOSE SERPL-MCNC: 96 MG/DL (ref 70–99)
HCT VFR BLD CALC: 24.9 % (ref 36–47)
HGB BLD-MCNC: 8.2 G/DL (ref 12–15.5)
LYMPHOCYTES # BLD: 0.8 X10^3/UL (ref 1–4.8)
LYMPHOCYTES NFR BLD AUTO: 17 % (ref 24–48)
MCH RBC QN AUTO: 26 PG (ref 25–35)
MCHC RBC AUTO-ENTMCNC: 33 G/DL (ref 31–37)
MCV RBC AUTO: 79 FL (ref 79–100)
MONO #: 0.4 X10^3/UL (ref 0–1.1)
MONOCYTES NFR BLD: 9 % (ref 0–9)
NEUT #: 3.4 X10^3/UL (ref 1.8–7.7)
NEUTROPHILS NFR BLD AUTO: 73 % (ref 31–73)
PLATELET # BLD AUTO: 241 X10^3/UL (ref 140–400)
POTASSIUM SERPL-SCNC: 3.3 MMOL/L (ref 3.5–5.1)
PROT SERPL-MCNC: 5.7 G/DL (ref 6.4–8.2)
RBC # BLD AUTO: 3.15 X10^6/UL (ref 3.5–5.4)
SODIUM SERPL-SCNC: 142 MMOL/L (ref 136–145)
WBC # BLD AUTO: 4.6 X10^3/UL (ref 4–11)

## 2020-01-30 RX ADMIN — CARVEDILOL SCH MG: 3.12 TABLET, FILM COATED ORAL at 09:01

## 2020-01-30 RX ADMIN — POTASSIUM BICARBONATE SCH MEQ: 782 TABLET, EFFERVESCENT ORAL at 14:00

## 2020-01-30 RX ADMIN — ACETAMINOPHEN PRN MG: 650 SOLUTION ORAL at 03:59

## 2020-01-30 RX ADMIN — IPRATROPIUM BROMIDE AND ALBUTEROL SULFATE SCH ML: .5; 3 SOLUTION RESPIRATORY (INHALATION) at 08:09

## 2020-01-30 RX ADMIN — CITALOPRAM HYDROBROMIDE SCH MG: 20 TABLET ORAL at 09:00

## 2020-01-30 RX ADMIN — ACETAMINOPHEN PRN MG: 650 SOLUTION ORAL at 21:08

## 2020-01-30 RX ADMIN — Medication SCH CAP: at 21:08

## 2020-01-30 RX ADMIN — Medication SCH CAP: at 08:58

## 2020-01-30 RX ADMIN — PIPERACILLIN SODIUM AND TAZOBACTAM SODIUM SCH MLS/HR: 3; .375 INJECTION, POWDER, LYOPHILIZED, FOR SOLUTION INTRAVENOUS at 12:00

## 2020-01-30 RX ADMIN — Medication SCH MG: at 08:58

## 2020-01-30 RX ADMIN — PANTOPRAZOLE SODIUM SCH MG: 40 TABLET, DELAYED RELEASE ORAL at 08:58

## 2020-01-30 RX ADMIN — ACETAMINOPHEN SCH MG: 325 TABLET, FILM COATED ORAL at 10:48

## 2020-01-30 RX ADMIN — POTASSIUM BICARBONATE SCH MEQ: 782 TABLET, EFFERVESCENT ORAL at 21:08

## 2020-01-30 RX ADMIN — POTASSIUM BICARBONATE SCH MEQ: 782 TABLET, EFFERVESCENT ORAL at 08:58

## 2020-01-30 RX ADMIN — PIPERACILLIN SODIUM AND TAZOBACTAM SODIUM SCH MLS/HR: 3; .375 INJECTION, POWDER, LYOPHILIZED, FOR SOLUTION INTRAVENOUS at 06:23

## 2020-01-30 RX ADMIN — CARVEDILOL SCH MG: 3.12 TABLET, FILM COATED ORAL at 18:24

## 2020-01-30 RX ADMIN — Medication SCH MLS/HR: at 10:49

## 2020-01-30 RX ADMIN — PIPERACILLIN SODIUM AND TAZOBACTAM SODIUM SCH MLS/HR: 3; .375 INJECTION, POWDER, LYOPHILIZED, FOR SOLUTION INTRAVENOUS at 18:42

## 2020-01-30 NOTE — NUR
Per patient request, temperature reassessed and oral reading of 102.0 degrees measured. 
Tylenol administered and patient in bed resting, no other needs voiced at this time.

## 2020-01-30 NOTE — PDOC
Infectious Disease Note


Subjective


Subjective


no n/v/d/sob





ROS


ROS


feeling better





Vital Sign


Vital Signs





Vital Signs








  Date Time  Temp Pulse Resp B/P (MAP) Pulse Ox O2 Delivery O2 Flow Rate FiO2


 


1/30/20 10:40 98.8 91 16 124/64 (84) 94 Room Air  





 98.8       











Physical Exam


PHYSICAL EXAM


PHYSICAL EXAMINATION:


GENERAL:  Alert and oriented female, not in distress.


VITAL SIGNS:  Stable.  


HEENT:  NAD.


NECK:  Supple, no JVP, no lymphadenopathy.


LUNGS:  Clear.


HEART:  S1, S2 regular.


ABDOMEN:  Benign.


EXTREMITIES:  No edema or cyanosis.


SKIN:  Unremarkable.


NEUROLOGIC:  The patient is alert, awake and appropriate.  No focal neurologic


deficit.





Labs


Lab





Laboratory Tests








Test


 1/29/20


13:00 1/29/20


17:50 1/30/20


03:45


 


Erythrocyte Sedimentation Rate 100 (0-25)   


 


Potassium Level


 3.3 mmol/L


(3.5-5.1) 


 3.3 mmol/L


(3.5-5.1)


 


Procalcitonin


 < 0.10 ng/mL


(0.00-0.10) 


 





 


Influenza Type A Antigen


 


 Negative


(NEGATIVE) 





 


Influenza Type B Antigen


 


 Negative


(NEGATIVE) 





 


White Blood Count


 


 


 4.6 x10^3/uL


(4.0-11.0)


 


Red Blood Count


 


 


 3.15 x10^6/uL


(3.50-5.40)


 


Hemoglobin


 


 


 8.2 g/dL


(12.0-15.5)


 


Hematocrit


 


 


 24.9 %


(36.0-47.0)


 


Mean Corpuscular Volume   79 fL () 


 


Mean Corpuscular Hemoglobin   26 pg (25-35) 


 


Mean Corpuscular Hemoglobin


Concent 


 


 33 g/dL


(31-37)


 


Red Cell Distribution Width


 


 


 17.3 %


(11.5-14.5)


 


Platelet Count


 


 


 241 x10^3/uL


(140-400)


 


Neutrophils (%) (Auto)   73 % (31-73) 


 


Lymphocytes (%) (Auto)   17 % (24-48) 


 


Monocytes (%) (Auto)   9 % (0-9) 


 


Eosinophils (%) (Auto)   1 % (0-3) 


 


Basophils (%) (Auto)   0 % (0-3) 


 


Neutrophils # (Auto)


 


 


 3.4 x10^3/uL


(1.8-7.7)


 


Lymphocytes # (Auto)


 


 


 0.8 x10^3/uL


(1.0-4.8)


 


Monocytes # (Auto)


 


 


 0.4 x10^3/uL


(0.0-1.1)


 


Eosinophils # (Auto)


 


 


 0.0 x10^3/uL


(0.0-0.7)


 


Basophils # (Auto)


 


 


 0.0 x10^3/uL


(0.0-0.2)


 


Sodium Level


 


 


 142 mmol/L


(136-145)


 


Chloride Level


 


 


 108 mmol/L


()


 


Carbon Dioxide Level


 


 


 25 mmol/L


(21-32)


 


Anion Gap   9 (6-14) 


 


Blood Urea Nitrogen   5 mg/dL (7-20) 


 


Creatinine


 


 


 0.6 mg/dL


(0.6-1.0)


 


Estimated GFR


(Cockcroft-Gault) 


 


 98.8 





 


BUN/Creatinine Ratio   8 (6-20) 


 


Glucose Level


 


 


 96 mg/dL


(70-99)


 


Calcium Level


 


 


 8.0 mg/dL


(8.5-10.1)


 


Total Bilirubin


 


 


 0.7 mg/dL


(0.2-1.0)


 


Aspartate Amino Transf


(AST/SGOT) 


 


 117 U/L


(15-37)


 


Alanine Aminotransferase


(ALT/SGPT) 


 


 36 U/L (14-59) 





 


Alkaline Phosphatase


 


 


 100 U/L


()


 


Total Protein


 


 


 5.7 g/dL


(6.4-8.2)


 


Albumin


 


 


 1.9 g/dL


(3.4-5.0)


 


Albumin/Globulin Ratio   0.5 (1.0-1.7) 








Micro





Microbiology


1/26/20 Blood Culture - Preliminary, Resulted


          NO GROWTH AFTER 2 DAYS





Objective


Assessment


IMPRESSION:


1.  Fever, most likely related to the hemorrhage in the pericolic gutter,


although she could have influenza, she could have any other infection.  She is


not immunosuppressed that she is not on any medication.


2.  Hepatocellular carcinoma.


3.  Coronary artery disease.


4.  Chronic obstructive pulmonary disease.


5.  Debility.


6.  Retention.





Plan


Plan of Care


cont supportive care


fever is improving


d/c josi rayo d/c SARWAT Joseph MD               Jan 30, 2020 10:47

## 2020-01-30 NOTE — NUR
SS following up with discharge planning. Insurance authorization received for [facility]indy De Le[facility], 
[phone]; fax [phone]. Physician notified. SS will await discharge orders and will 
proceed accordingly.

## 2020-01-30 NOTE — CONS
DATE OF CONSULTATION:  01/29/2020



REQUESTING PHYSICIAN:  Dr. Schulte.



REASON FOR CONSULTATION:  Fever.



HISTORY OF PRESENT ILLNESS:  This is a 69-year-old  female with a

history of hepatocellular carcinoma.  She had undergone Y90 therapy.  The

patient came in with sudden onset of nausea, vomiting, diarrhea and weakness. 

The patient was noted to be retaining urine in the ER, hence Sorensen was placed. 

The patient has been running fever since she is here, hence consultation.  The

patient is on Rocephin.  The patient denies any nausea or vomiting.  Denies any

diarrhea, denies any chest pain or shortness of breath.  Denies any abdominal

pain.  Her abdominal CT showed some hemorrhage in the pericolic gutter and she

has a hepatic mass.  No other obvious reason for the fever was found.



PAST MEDICAL HISTORY:  Positive for hepatocellular carcinoma.  The patient also

has coronary artery disease, hypertension, hyperlipidemia, peripheral arterial

disease with stenting done, COPD, CVA, peripheral neuropathy and

gastroesophageal reflux disease.



SOCIAL HISTORY:  Negative for smoking, alcohol or illicit drug use.



ALLERGIES:  CLOPIDOGREL.



CURRENT MEDICATIONS:  Reviewed.  The patient is on Rocephin.



REVIEW OF SYSTEMS:  As per HPI, all other systems reviewed and are negative.



PHYSICAL EXAMINATION:

GENERAL:  Alert and oriented female, not in distress.

VITAL SIGNS:  Stable.  T-max is 102.3.

HEENT:  NAD.

NECK:  Supple, no JVP, no lymphadenopathy.

LUNGS:  Clear.

HEART:  S1, S2 regular.

ABDOMEN:  Benign.

EXTREMITIES:  No edema or cyanosis.

SKIN:  Unremarkable.

NEUROLOGIC:  The patient is alert, awake and appropriate.  No focal neurologic

deficit.



LABORATORY DATA:  White count is normal at 4.3, hemoglobin 7.6 and platelets are

normal.  BUN and creatinine are normal, low potassium.  Urinalysis is

unremarkable for infection.  Blood culture is so far negative.



Abdominal CT as I mentioned pericolic gutter less likely complex fluid, it is a

hemorrhage.



IMPRESSION:

1.  Fever, most likely related to the hemorrhage in the pericolic gutter,

although she could have influenza, she could have any other infection.  She is

not immunosuppressed that she is not on any medication.

2.  Hepatocellular carcinoma.

3.  Coronary artery disease.

4.  Chronic obstructive pulmonary disease.

5.  Debility.

6.  Retention.



RECOMMENDATIONS:  We will change Rocephin to Zosyn.  We will get procalcitonin. 

Influenza PCR would be ideal if we can get it.  Supportive care and we will

continue to follow.



Thank you very much, Dr. Schulte, for giving me the opportunity to participate

in this patient's care.

 



______________________________

SARWAT CASAS MD



DR:  RICHARD/soraida  JOB#:  [id number] / [id number]

DD:  01/29/2020 11:32  DT:  01/30/2020 00:25

## 2020-01-30 NOTE — PDOC
PROGRESS NOTES


Chief Complaint


Chief Complaint


STage 4 HCC


Elev AFP


FEvers, intermittent


CLean UA


Hypokalemia corrected


Anemia


Renal insufficiency


Hypoalbuminemia


Weakness - SNU agreebale


Falls


Dehydration resolved


Debility





History of Present Illness


History of Present Illness


STill fevers


She does have some hemorrhagic component in her abd CT /HCC with mets that can 

cause low grade temp


SHe is agreeable prater removal, inserted over the weekend bec of retention


ID has escalated to zosyn from rocephin





transferred out of ICU 1/28


SNU slated


AFP elevated 300s, 





K better 3,3 from 2


On KCL IVF, fair pO 





PLAN:


may dc current KCL IVF


INc kcl to 40 TID from BID


Iv zosyn per ID


follow BC


Dc prater, bladder scan protocol today


Mina keep or dc tele


SNU slated (not ready today)





Vitals


Vitals





Vital Signs








  Date Time  Temp Pulse Resp B/P (MAP) Pulse Ox O2 Delivery O2 Flow Rate FiO2


 


1/30/20 10:40 98.8 91 16 124/64 (84) 94 Room Air  





 98.8       











Physical Exam


Physical Exam


PHYSICAL EXAMINATION:


GENERAL:  Alert and oriented female, not in distress.


VITAL SIGNS:  Stable.  


HEENT:  NAD.


NECK:  Supple, no JVP, no lymphadenopathy.


LUNGS:  Clear.


HEART:  S1, S2 regular.


ABDOMEN:  Benign.


EXTREMITIES:  No edema or cyanosis.


SKIN:  Unremarkable.


NEUROLOGIC:  The patient is alert, awake and appropriate.  No focal neurologic


deficit.


General:  Alert, Oriented X3, Cooperative, No acute distress


Heart:  Regular rate, Normal S1, Normal S2, Other (2/6 systolic murmur )


Lungs:  Clear


Abdomen:  Soft, No tenderness


Extremities:  No edema, Normal pulses


Skin:  No significant lesion





Labs


LABS





Laboratory Tests








Test


 1/29/20


13:00 1/29/20


17:50 1/30/20


03:45


 


Erythrocyte Sedimentation Rate 100 (0-25)   


 


Potassium Level


 3.3 mmol/L


(3.5-5.1) 


 3.3 mmol/L


(3.5-5.1)


 


Procalcitonin


 < 0.10 ng/mL


(0.00-0.10) 


 





 


Influenza Type A Antigen


 


 Negative


(NEGATIVE) 





 


Influenza Type B Antigen


 


 Negative


(NEGATIVE) 





 


White Blood Count


 


 


 4.6 x10^3/uL


(4.0-11.0)


 


Red Blood Count


 


 


 3.15 x10^6/uL


(3.50-5.40)


 


Hemoglobin


 


 


 8.2 g/dL


(12.0-15.5)


 


Hematocrit


 


 


 24.9 %


(36.0-47.0)


 


Mean Corpuscular Volume   79 fL () 


 


Mean Corpuscular Hemoglobin   26 pg (25-35) 


 


Mean Corpuscular Hemoglobin


Concent 


 


 33 g/dL


(31-37)


 


Red Cell Distribution Width


 


 


 17.3 %


(11.5-14.5)


 


Platelet Count


 


 


 241 x10^3/uL


(140-400)


 


Neutrophils (%) (Auto)   73 % (31-73) 


 


Lymphocytes (%) (Auto)   17 % (24-48) 


 


Monocytes (%) (Auto)   9 % (0-9) 


 


Eosinophils (%) (Auto)   1 % (0-3) 


 


Basophils (%) (Auto)   0 % (0-3) 


 


Neutrophils # (Auto)


 


 


 3.4 x10^3/uL


(1.8-7.7)


 


Lymphocytes # (Auto)


 


 


 0.8 x10^3/uL


(1.0-4.8)


 


Monocytes # (Auto)


 


 


 0.4 x10^3/uL


(0.0-1.1)


 


Eosinophils # (Auto)


 


 


 0.0 x10^3/uL


(0.0-0.7)


 


Basophils # (Auto)


 


 


 0.0 x10^3/uL


(0.0-0.2)


 


Sodium Level


 


 


 142 mmol/L


(136-145)


 


Chloride Level


 


 


 108 mmol/L


()


 


Carbon Dioxide Level


 


 


 25 mmol/L


(21-32)


 


Anion Gap   9 (6-14) 


 


Blood Urea Nitrogen   5 mg/dL (7-20) 


 


Creatinine


 


 


 0.6 mg/dL


(0.6-1.0)


 


Estimated GFR


(Cockcroft-Gault) 


 


 98.8 





 


BUN/Creatinine Ratio   8 (6-20) 


 


Glucose Level


 


 


 96 mg/dL


(70-99)


 


Calcium Level


 


 


 8.0 mg/dL


(8.5-10.1)


 


Total Bilirubin


 


 


 0.7 mg/dL


(0.2-1.0)


 


Aspartate Amino Transf


(AST/SGOT) 


 


 117 U/L


(15-37)


 


Alanine Aminotransferase


(ALT/SGPT) 


 


 36 U/L (14-59) 





 


Alkaline Phosphatase


 


 


 100 U/L


()


 


Total Protein


 


 


 5.7 g/dL


(6.4-8.2)


 


Albumin


 


 


 1.9 g/dL


(3.4-5.0)


 


Albumin/Globulin Ratio   0.5 (1.0-1.7) 











Review of Systems


Review of Systems


weak, all else neg 14 pt reviewed with her





Assessment and Plan


Assessmemt and Plan


Problems


Medical Problems:


(1) Abdominal pain


Status: Acute  





(2) Anemia


Status: Acute  





(3) Elevated troponin


Status: Acute  





(4) Generalized weakness


Status: Acute  





(5) Hypoalbuminemia


Status: Acute  





(6) Liver cancer


Status: Acute  





(7) Nausea and vomiting


Status: Acute  





(8) Renal insufficiency


Status: Acute  











Comment


Review of Relevant


I have reviewed the following items ignacio (where applicable) has been applied.


Labs





Laboratory Tests








Test


 1/29/20


03:20 1/29/20


13:00 1/29/20


17:50 1/30/20


03:45


 


White Blood Count


 4.3 x10^3/uL


(4.0-11.0) 


 


 4.6 x10^3/uL


(4.0-11.0)


 


Red Blood Count


 2.94 x10^6/uL


(3.50-5.40) 


 


 3.15 x10^6/uL


(3.50-5.40)


 


Hemoglobin


 7.6 g/dL


(12.0-15.5) 


 


 8.2 g/dL


(12.0-15.5)


 


Hematocrit


 23.1 %


(36.0-47.0) 


 


 24.9 %


(36.0-47.0)


 


Mean Corpuscular Volume 79 fL ()    79 fL () 


 


Mean Corpuscular Hemoglobin 26 pg (25-35)    26 pg (25-35) 


 


Mean Corpuscular Hemoglobin


Concent 33 g/dL


(31-37) 


 


 33 g/dL


(31-37)


 


Red Cell Distribution Width


 17.1 %


(11.5-14.5) 


 


 17.3 %


(11.5-14.5)


 


Platelet Count


 217 x10^3/uL


(140-400) 


 


 241 x10^3/uL


(140-400)


 


Neutrophils (%) (Auto) 71 % (31-73)    73 % (31-73) 


 


Lymphocytes (%) (Auto) 18 % (24-48)    17 % (24-48) 


 


Monocytes (%) (Auto) 11 % (0-9)    9 % (0-9) 


 


Eosinophils (%) (Auto) 0 % (0-3)    1 % (0-3) 


 


Basophils (%) (Auto) 1 % (0-3)    0 % (0-3) 


 


Neutrophils # (Auto)


 3.0 x10^3/uL


(1.8-7.7) 


 


 3.4 x10^3/uL


(1.8-7.7)


 


Lymphocytes # (Auto)


 0.7 x10^3/uL


(1.0-4.8) 


 


 0.8 x10^3/uL


(1.0-4.8)


 


Monocytes # (Auto)


 0.4 x10^3/uL


(0.0-1.1) 


 


 0.4 x10^3/uL


(0.0-1.1)


 


Eosinophils # (Auto)


 0.0 x10^3/uL


(0.0-0.7) 


 


 0.0 x10^3/uL


(0.0-0.7)


 


Basophils # (Auto)


 0.0 x10^3/uL


(0.0-0.2) 


 


 0.0 x10^3/uL


(0.0-0.2)


 


Sodium Level


 143 mmol/L


(136-145) 


 


 142 mmol/L


(136-145)


 


Potassium Level


 2.7 mmol/L


(3.5-5.1) 3.3 mmol/L


(3.5-5.1) 


 3.3 mmol/L


(3.5-5.1)


 


Chloride Level


 108 mmol/L


() 


 


 108 mmol/L


()


 


Carbon Dioxide Level


 24 mmol/L


(21-32) 


 


 25 mmol/L


(21-32)


 


Anion Gap 11 (6-14)    9 (6-14) 


 


Blood Urea Nitrogen 4 mg/dL (7-20)    5 mg/dL (7-20) 


 


Creatinine


 0.7 mg/dL


(0.6-1.0) 


 


 0.6 mg/dL


(0.6-1.0)


 


Estimated GFR


(Cockcroft-Gault) 82.7 


 


 


 98.8 





 


BUN/Creatinine Ratio 6 (6-20)    8 (6-20) 


 


Glucose Level


 113 mg/dL


(70-99) 


 


 96 mg/dL


(70-99)


 


Calcium Level


 7.7 mg/dL


(8.5-10.1) 


 


 8.0 mg/dL


(8.5-10.1)


 


Total Bilirubin


 0.4 mg/dL


(0.2-1.0) 


 


 0.7 mg/dL


(0.2-1.0)


 


Aspartate Amino Transf


(AST/SGOT) 148 U/L


(15-37) 


 


 117 U/L


(15-37)


 


Alanine Aminotransferase


(ALT/SGPT) 31 U/L (14-59) 


 


 


 36 U/L (14-59) 





 


Alkaline Phosphatase


 77 U/L


() 


 


 100 U/L


()


 


Total Protein


 5.5 g/dL


(6.4-8.2) 


 


 5.7 g/dL


(6.4-8.2)


 


Albumin


 2.0 g/dL


(3.4-5.0) 


 


 1.9 g/dL


(3.4-5.0)


 


Albumin/Globulin Ratio 0.6 (1.0-1.7)    0.5 (1.0-1.7) 


 


Erythrocyte Sedimentation Rate  100 (0-25)   


 


Procalcitonin


 


 < 0.10 ng/mL


(0.00-0.10) 


 





 


Influenza Type A Antigen


 


 


 Negative


(NEGATIVE) 





 


Influenza Type B Antigen


 


 


 Negative


(NEGATIVE) 











Laboratory Tests








Test


 1/29/20


13:00 1/29/20


17:50 1/30/20


03:45


 


Erythrocyte Sedimentation Rate 100 (0-25)   


 


Potassium Level


 3.3 mmol/L


(3.5-5.1) 


 3.3 mmol/L


(3.5-5.1)


 


Procalcitonin


 < 0.10 ng/mL


(0.00-0.10) 


 





 


Influenza Type A Antigen


 


 Negative


(NEGATIVE) 





 


Influenza Type B Antigen


 


 Negative


(NEGATIVE) 





 


White Blood Count


 


 


 4.6 x10^3/uL


(4.0-11.0)


 


Red Blood Count


 


 


 3.15 x10^6/uL


(3.50-5.40)


 


Hemoglobin


 


 


 8.2 g/dL


(12.0-15.5)


 


Hematocrit


 


 


 24.9 %


(36.0-47.0)


 


Mean Corpuscular Volume   79 fL () 


 


Mean Corpuscular Hemoglobin   26 pg (25-35) 


 


Mean Corpuscular Hemoglobin


Concent 


 


 33 g/dL


(31-37)


 


Red Cell Distribution Width


 


 


 17.3 %


(11.5-14.5)


 


Platelet Count


 


 


 241 x10^3/uL


(140-400)


 


Neutrophils (%) (Auto)   73 % (31-73) 


 


Lymphocytes (%) (Auto)   17 % (24-48) 


 


Monocytes (%) (Auto)   9 % (0-9) 


 


Eosinophils (%) (Auto)   1 % (0-3) 


 


Basophils (%) (Auto)   0 % (0-3) 


 


Neutrophils # (Auto)


 


 


 3.4 x10^3/uL


(1.8-7.7)


 


Lymphocytes # (Auto)


 


 


 0.8 x10^3/uL


(1.0-4.8)


 


Monocytes # (Auto)


 


 


 0.4 x10^3/uL


(0.0-1.1)


 


Eosinophils # (Auto)


 


 


 0.0 x10^3/uL


(0.0-0.7)


 


Basophils # (Auto)


 


 


 0.0 x10^3/uL


(0.0-0.2)


 


Sodium Level


 


 


 142 mmol/L


(136-145)


 


Chloride Level


 


 


 108 mmol/L


()


 


Carbon Dioxide Level


 


 


 25 mmol/L


(21-32)


 


Anion Gap   9 (6-14) 


 


Blood Urea Nitrogen   5 mg/dL (7-20) 


 


Creatinine


 


 


 0.6 mg/dL


(0.6-1.0)


 


Estimated GFR


(Cockcroft-Gault) 


 


 98.8 





 


BUN/Creatinine Ratio   8 (6-20) 


 


Glucose Level


 


 


 96 mg/dL


(70-99)


 


Calcium Level


 


 


 8.0 mg/dL


(8.5-10.1)


 


Total Bilirubin


 


 


 0.7 mg/dL


(0.2-1.0)


 


Aspartate Amino Transf


(AST/SGOT) 


 


 117 U/L


(15-37)


 


Alanine Aminotransferase


(ALT/SGPT) 


 


 36 U/L (14-59) 





 


Alkaline Phosphatase


 


 


 100 U/L


()


 


Total Protein


 


 


 5.7 g/dL


(6.4-8.2)


 


Albumin


 


 


 1.9 g/dL


(3.4-5.0)


 


Albumin/Globulin Ratio   0.5 (1.0-1.7) 








Microbiology


1/26/20 Blood Culture - Preliminary, Resulted


          NO GROWTH AFTER 3 DAYS


Medications





Current Medications


Sodium Chloride 1,000 ml @  1,000 mls/hr Q1H IV  Last administered on 1/26/20at 

12:51;  Start 1/26/20 at 12:51;  Stop 1/26/20 at 13:50;  Status DC


Ondansetron HCl (Zofran) 4 mg 1X  ONCE IV ;  Start 1/26/20 at 13:00;  Stop 

1/26/20 at 13:01;  Status DC


Sodium Chloride (Normal Saline Flush) 3 ml QSHIFT  PRN IV AFTER MEDS AND BLOOD 

DRAWS;  Start 1/26/20 at 14:45


Sodium Chloride 1,000 ml @  100 mls/hr Q10H IV  Last administered on 1/28/20at 

07:51;  Start 1/26/20 at 14:40;  Stop 1/28/20 at 11:38;  Status DC


Ondansetron HCl (Zofran) 4 mg PRN Q4HRS  PRN IV NAUSEA/VOMITING;  Start 1/26/20 

at 14:45


Acetaminophen (Tylenol) 650 mg PRN Q4HRS  PRN GT TEMP OVER 100.4F OR MILD PAIN 

Last administered on 1/30/20at 03:59;  Start 1/26/20 at 14:45


Clonidine HCl (Catapres) 0.1 mg PRN Q6HRS  PRN PO SBP>160 OR DBP>90;  Start 

1/26/20 at 14:45


Docusate Sodium (Colace) 100 mg PRN BID  PRN PO CONSTIPATION;  Start 1/26/20 at 

14:45


Albuterol/ Ipratropium (Duoneb) 3 ml Q4HRS NEB  Last administered on 1/28/20at 

07:51;  Start 1/26/20 at 16:00;  Stop 1/28/20 at 13:31;  Status DC


Guaifenesin (Robitussin) 200 mg PRN Q4HRS  PRN PO COUGH;  Start 1/26/20 at 14:45


Lorazepam (Ativan) 0.5 mg PRN Q4HRS  PRN PO ANXIETY / AGITATION;  Start 1/26/20 

at 14:45;  Stop 1/28/20 at 11:18;  Status DC


Enoxaparin Sodium (Lovenox 40mg Syringe) 40 mg Q24H SQ  Last administered on 

1/27/20at 23:04;  Start 1/26/20 at 21:00;  Stop 1/28/20 at 11:38;  Status DC


Potassium Chloride/Water 100 ml @  100 mls/hr Q1H IV ;  Start 1/26/20 at 14:45; 

Stop 1/26/20 at 14:49;  Status DC


Alprazolam (Xanax) 0.5 mg PRN BID  PRN PO ANXIETY / AGITATION (SEVERE) Last 

administered on 1/29/20at 21:14;  Start 1/26/20 at 21:00


Amlodipine Besylate (Norvasc) 5 mg DAILY PO  Last administered on 1/30/20at 

09:01;  Start 1/26/20 at 16:00


Carvedilol (Coreg) 3.125 mg BIDWMEALS PO  Last administered on 1/30/20at 09:01; 

Start 1/26/20 at 17:00


Citalopram Hydrobromide (CeleXA) 20 mg DAILY PO  Last administered on 1/30/20at 

09:00;  Start 1/26/20 at 16:00


Lactobacillus Rhamnosus (Culturelle) 1 cap BID PO  Last administered on 

1/30/20at 08:58;  Start 1/26/20 at 21:00


Potassium Chloride (Klor-Con) 20 meq DAILYWBKFT PO  Last administered on 

1/28/20at 08:48;  Start 1/26/20 at 17:00;  Stop 1/28/20 at 11:38;  Status DC


Sodium Chloride 1,000 ml @  125 mls/hr Q8H IV ;  Start 1/26/20 at 14:46;  Stop 

1/26/20 at 15:09;  Status DC


Potassium Chloride/Water 100 ml @  100 mls/hr Q1H IV  Last administered on 

1/26/20at 23:07;  Start 1/26/20 at 16:00;  Stop 1/26/20 at 19:59;  Status DC


Alprazolam (Xanax) 0.25 mg PRN BID  PRN PO ANXIETY / AGITATION (MODERATE);  

Start 1/26/20 at 15:00


Pantoprazole Sodium (PROTONIX VIAL for IV PUSH) 40 mg DAILYAC IVP  Last 

administered on 1/28/20at 07:51;  Start 1/27/20 at 07:30;  Stop 1/28/20 at 

11:38;  Status DC


Potassium Bicarbonate (Potassium Effervescent Tablet) 40 meq 1X  ONCE PEG  Last 

administered on 1/27/20at 09:43;  Start 1/27/20 at 08:45;  Stop 1/27/20 at 

08:46;  Status DC


Ceftriaxone Sodium (Rocephin) 1 gm Q24H IVP  Last administered on 1/28/20at 

21:08;  Start 1/27/20 at 22:30;  Stop 1/29/20 at 11:34;  Status DC


Potassium Chloride (Klor-Con) 40 meq 1X  ONCE PO  Last administered on 1/28/20at

07:37;  Start 1/28/20 at 06:30;  Stop 1/28/20 at 06:31;  Status DC


Potassium Chloride (Klor-Con) 40 meq DAILYWBKFT PO  Last administered on 

1/28/20at 14:07;  Start 1/28/20 at 11:45;  Stop 1/29/20 at 05:52;  Status DC


Potassium Chloride/Dextrose/ Sod Cl 1,000 ml @  75 mls/hr [id number] IV  Last 

administered on 1/29/20at 17:44;  Start 1/28/20 at 12:00;  Stop 1/30/20 at 

08:38;  Status DC


Pantoprazole Sodium (Protonix) 40 mg DAILYAC PO  Last administered on 1/30/20at 

08:58;  Start 1/29/20 at 07:30


Albuterol/ Ipratropium (Duoneb) 3 ml DAILY08 NEB  Last administered on 1/30/20at

08:09;  Start 1/29/20 at 08:00


Albuterol Sulfate (Ventolin Neb Soln) 2.5 mg PRN Q4HRS  PRN NEB SHORTNESS OF 

BREATH;  Start 1/28/20 at 13:30


Enoxaparin Sodium (Lovenox 40mg Syringe) 40 mg Q24H SQ  Last administered on 

1/28/20at 16:29;  Start 1/28/20 at 15:00;  Stop 1/29/20 at 07:40;  Status DC


Potassium Bicarbonate (Potassium Effervescent Tablet) 40 meq BIDWMEALS PO  Last 

administered on 1/29/20at 17:32;  Start 1/29/20 at 08:00;  Stop 1/30/20 at 

08:39;  Status DC


Potassium Chloride (Klor-Con) 40 meq 1X  ONCE PO ;  Start 1/29/20 at 06:00;  

Stop 1/29/20 at 05:58;  Status DC


Potassium Bicarbonate (Potassium Effervescent Tablet) 40 meq 1X  ONCE PO  Last 

administered on 1/29/20at 06:05;  Start 1/29/20 at 06:00;  Stop 1/29/20 at 

06:01;  Status DC


Ferrous Sulfate (Feosol) 325 mg DAILYWBKFT PO  Last administered on 1/30/20at 

08:58;  Start 1/29/20 at 12:00;  Stop 1/30/20 at 10:01;  Status DC


Iron Sucrose 300 mg/Sodium Chloride 265 ml @  88.333 mls/ hr DAILY@1100 IV  Last

administered on 1/30/20at 10:49;  Start 1/29/20 at 11:00;  Stop 1/31/20 at 13:59


Diphenhydramine HCl (Benadryl) 25 mg DAILY@1030 PO  Last administered on 

1/30/20at 10:48;  Start 1/29/20 at 10:30;  Stop 1/31/20 at 10:31


Acetaminophen (Tylenol) 650 mg DAILY@1030 PO  Last administered on 1/30/20at 

10:48;  Start 1/29/20 at 10:30;  Stop 1/31/20 at 10:31


Piperacillin Sod/ Tazobactam Sod 3.375 gm/Sodium Chloride 50 ml @  100 mls/hr 

Q6HRS IV ;  Start 1/29/20 at 12:00;  Stop 1/29/20 at 12:12;  Status DC


Piperacillin Sod/ Tazobactam Sod 3.375 gm/Sodium Chloride 50 ml @  100 mls/hr 

Q6HRS IV  Last administered on 1/30/20at 06:23;  Start 1/29/20 at 18:00


Potassium Bicarbonate (Potassium Effervescent Tablet) 40 meq TID PO  Last 

administered on 1/30/20at 08:58;  Start 1/30/20 at 09:00





Active Scripts


Active


Culturelle (Lactobacillus Rhamnosus Gg) 1 Each Cap.sprink 1 Cap PO BID 14 Days


Colace (Docusate Sodium) 100 Mg Capsule 100 Mg PO PRN BID PRN 14 Days


Klor-Con M20 (Potassium Chloride) 20 Meq Tab.er.prt 20 Meq PO DAILYWBKFT 10 Days


Aspirin Ec (Aspirin) 325 Mg Tablet.dr 325 Mg PO DAILYWBKFT 30 Days


Amlodipine Besylate 5 Mg Tablet 5 Mg PO DAILY 30 Days


Reported


Citalopram Hbr (Citalopram Hydrobromide) 20 Mg Tablet 1 Tab PO DAILY


[atrovent hfa]   17 Mcg  QID


Xanax (Alprazolam) 0.5 Mg Tablet 0.5-1 Tab PO BID


Coreg  ** (Carvedilol) 3.125 Mg Tablet 1 Tab PO BID


Nexium Capsule (Esomeprazole Magnesium) 40 Mg Capsule.dr 40 Mg PO DAILYAC


Atorvastatin Calcium 40 Mg Tablet 40 Mg PO HS


Vitals/I & O





Vital Sign - Last 24 Hours








 1/29/20 1/29/20 1/29/20 1/29/20





 15:04 17:32 17:54 19:40


 


Temp 98.9  101.1 98.9





 98.9  101.1 98.9


 


Pulse 87 87  89


 


Resp 16   18


 


B/P (MAP) 119/58 (78) 119/58  128/60 (82)


 


Pulse Ox 95   94


 


O2 Delivery Room Air   Room Air


 


    





    





 1/29/20 1/29/20 1/30/20 1/30/20





 20:00 23:05 03:54 07:35


 


Temp  98.8 100.9 98.1





  98.8 100.9 98.1


 


Pulse  88 99 89


 


Resp  18 16 16


 


B/P (MAP)  136/73 (94) 139/73 (95) 127/70 (89)


 


Pulse Ox  93 93 94


 


O2 Delivery Room Air Room Air Room Air Room Air


 


    





    





 1/30/20 1/30/20 1/30/20 1/30/20





 08:00 08:09 09:01 09:01


 


Pulse   89 89


 


B/P (MAP)   127/70 127/70


 


Pulse Ox  96  


 


O2 Delivery Room Air Room Air  





 1/30/20   





 10:40   


 


Temp 98.8   





 98.8   


 


Pulse 91   


 


Resp 16   


 


B/P (MAP) 124/64 (84)   


 


Pulse Ox 94   


 


O2 Delivery Room Air   














Intake and Output   


 


 1/29/20 1/29/20 1/30/20





 15:00 23:00 07:00


 


Intake Total 300 ml 150 ml 100 ml


 


Output Total 875 ml  1350 ml


 


Balance -575 ml 150 ml -1250 ml











Nutrition Consultation


Dietary Evaluation:


Recommendations by RD:  Dietary education by RD, Increase Calorie Intake, Pro

tein supplementation


Comments:  


REC cardiac w/oral supplements





REC Ensure clear q day


Expected Outcomes/Goals:  


diet advancement- met





new goal: to meet >75% est nutr needs





Malnutrition Findings:


Food and Nutrition Intake (Sev:  <50% est energy req 5days


Weight Status:  Appropriate





Hemodynamically unstable?:  No


Is patient in severe pain?:  No


Is NPO status required?:  No











PUNEET PIRES MD           Jan 30, 2020 12:29

## 2020-01-30 NOTE — PDOC
ANTON RATLIFF APRN 1/30/20 1229:


SURGICAL PROGRESS NOTE


Subjective


some ongoing pain LUQ


no n/v


Vital Signs





Vital Signs








  Date Time  Temp Pulse Resp B/P (MAP) Pulse Ox O2 Delivery O2 Flow Rate FiO2


 


1/30/20 10:40 98.8 91 16 124/64 (84) 94 Room Air  





 98.8       








I&O











Intake and Output 


 


 1/30/20





 07:00


 


Intake Total 550 ml


 


Output Total 2225 ml


 


Balance -1675 ml


 


 


 


Intake Oral 550 ml


 


Output Urine Total 2225 ml








General:  Cooperative, No acute distress


Abdomen:  Soft


Labs





Laboratory Tests








Test


 1/29/20


03:20 1/29/20


13:00 1/29/20


17:50 1/30/20


03:45


 


White Blood Count


 4.3 x10^3/uL


(4.0-11.0) 


 


 4.6 x10^3/uL


(4.0-11.0)


 


Red Blood Count


 2.94 x10^6/uL


(3.50-5.40) 


 


 3.15 x10^6/uL


(3.50-5.40)


 


Hemoglobin


 7.6 g/dL


(12.0-15.5) 


 


 8.2 g/dL


(12.0-15.5)


 


Hematocrit


 23.1 %


(36.0-47.0) 


 


 24.9 %


(36.0-47.0)


 


Mean Corpuscular Volume 79 fL ()    79 fL () 


 


Mean Corpuscular Hemoglobin 26 pg (25-35)    26 pg (25-35) 


 


Mean Corpuscular Hemoglobin


Concent 33 g/dL


(31-37) 


 


 33 g/dL


(31-37)


 


Red Cell Distribution Width


 17.1 %


(11.5-14.5) 


 


 17.3 %


(11.5-14.5)


 


Platelet Count


 217 x10^3/uL


(140-400) 


 


 241 x10^3/uL


(140-400)


 


Neutrophils (%) (Auto) 71 % (31-73)    73 % (31-73) 


 


Lymphocytes (%) (Auto) 18 % (24-48)    17 % (24-48) 


 


Monocytes (%) (Auto) 11 % (0-9)    9 % (0-9) 


 


Eosinophils (%) (Auto) 0 % (0-3)    1 % (0-3) 


 


Basophils (%) (Auto) 1 % (0-3)    0 % (0-3) 


 


Neutrophils # (Auto)


 3.0 x10^3/uL


(1.8-7.7) 


 


 3.4 x10^3/uL


(1.8-7.7)


 


Lymphocytes # (Auto)


 0.7 x10^3/uL


(1.0-4.8) 


 


 0.8 x10^3/uL


(1.0-4.8)


 


Monocytes # (Auto)


 0.4 x10^3/uL


(0.0-1.1) 


 


 0.4 x10^3/uL


(0.0-1.1)


 


Eosinophils # (Auto)


 0.0 x10^3/uL


(0.0-0.7) 


 


 0.0 x10^3/uL


(0.0-0.7)


 


Basophils # (Auto)


 0.0 x10^3/uL


(0.0-0.2) 


 


 0.0 x10^3/uL


(0.0-0.2)


 


Sodium Level


 143 mmol/L


(136-145) 


 


 142 mmol/L


(136-145)


 


Potassium Level


 2.7 mmol/L


(3.5-5.1) 3.3 mmol/L


(3.5-5.1) 


 3.3 mmol/L


(3.5-5.1)


 


Chloride Level


 108 mmol/L


() 


 


 108 mmol/L


()


 


Carbon Dioxide Level


 24 mmol/L


(21-32) 


 


 25 mmol/L


(21-32)


 


Anion Gap 11 (6-14)    9 (6-14) 


 


Blood Urea Nitrogen 4 mg/dL (7-20)    5 mg/dL (7-20) 


 


Creatinine


 0.7 mg/dL


(0.6-1.0) 


 


 0.6 mg/dL


(0.6-1.0)


 


Estimated GFR


(Cockcroft-Gault) 82.7 


 


 


 98.8 





 


BUN/Creatinine Ratio 6 (6-20)    8 (6-20) 


 


Glucose Level


 113 mg/dL


(70-99) 


 


 96 mg/dL


(70-99)


 


Calcium Level


 7.7 mg/dL


(8.5-10.1) 


 


 8.0 mg/dL


(8.5-10.1)


 


Total Bilirubin


 0.4 mg/dL


(0.2-1.0) 


 


 0.7 mg/dL


(0.2-1.0)


 


Aspartate Amino Transf


(AST/SGOT) 148 U/L


(15-37) 


 


 117 U/L


(15-37)


 


Alanine Aminotransferase


(ALT/SGPT) 31 U/L (14-59) 


 


 


 36 U/L (14-59) 





 


Alkaline Phosphatase


 77 U/L


() 


 


 100 U/L


()


 


Total Protein


 5.5 g/dL


(6.4-8.2) 


 


 5.7 g/dL


(6.4-8.2)


 


Albumin


 2.0 g/dL


(3.4-5.0) 


 


 1.9 g/dL


(3.4-5.0)


 


Albumin/Globulin Ratio 0.6 (1.0-1.7)    0.5 (1.0-1.7) 


 


Erythrocyte Sedimentation Rate  100 (0-25)   


 


Procalcitonin


 


 < 0.10 ng/mL


(0.00-0.10) 


 





 


Influenza Type A Antigen


 


 


 Negative


(NEGATIVE) 





 


Influenza Type B Antigen


 


 


 Negative


(NEGATIVE) 











Laboratory Tests








Test


 1/29/20


13:00 1/29/20


17:50 1/30/20


03:45


 


Erythrocyte Sedimentation Rate 100 (0-25)   


 


Potassium Level


 3.3 mmol/L


(3.5-5.1) 


 3.3 mmol/L


(3.5-5.1)


 


Procalcitonin


 < 0.10 ng/mL


(0.00-0.10) 


 





 


Influenza Type A Antigen


 


 Negative


(NEGATIVE) 





 


Influenza Type B Antigen


 


 Negative


(NEGATIVE) 





 


White Blood Count


 


 


 4.6 x10^3/uL


(4.0-11.0)


 


Red Blood Count


 


 


 3.15 x10^6/uL


(3.50-5.40)


 


Hemoglobin


 


 


 8.2 g/dL


(12.0-15.5)


 


Hematocrit


 


 


 24.9 %


(36.0-47.0)


 


Mean Corpuscular Volume   79 fL () 


 


Mean Corpuscular Hemoglobin   26 pg (25-35) 


 


Mean Corpuscular Hemoglobin


Concent 


 


 33 g/dL


(31-37)


 


Red Cell Distribution Width


 


 


 17.3 %


(11.5-14.5)


 


Platelet Count


 


 


 241 x10^3/uL


(140-400)


 


Neutrophils (%) (Auto)   73 % (31-73) 


 


Lymphocytes (%) (Auto)   17 % (24-48) 


 


Monocytes (%) (Auto)   9 % (0-9) 


 


Eosinophils (%) (Auto)   1 % (0-3) 


 


Basophils (%) (Auto)   0 % (0-3) 


 


Neutrophils # (Auto)


 


 


 3.4 x10^3/uL


(1.8-7.7)


 


Lymphocytes # (Auto)


 


 


 0.8 x10^3/uL


(1.0-4.8)


 


Monocytes # (Auto)


 


 


 0.4 x10^3/uL


(0.0-1.1)


 


Eosinophils # (Auto)


 


 


 0.0 x10^3/uL


(0.0-0.7)


 


Basophils # (Auto)


 


 


 0.0 x10^3/uL


(0.0-0.2)


 


Sodium Level


 


 


 142 mmol/L


(136-145)


 


Chloride Level


 


 


 108 mmol/L


()


 


Carbon Dioxide Level


 


 


 25 mmol/L


(21-32)


 


Anion Gap   9 (6-14) 


 


Blood Urea Nitrogen   5 mg/dL (7-20) 


 


Creatinine


 


 


 0.6 mg/dL


(0.6-1.0)


 


Estimated GFR


(Cockcroft-Gault) 


 


 98.8 





 


BUN/Creatinine Ratio   8 (6-20) 


 


Glucose Level


 


 


 96 mg/dL


(70-99)


 


Calcium Level


 


 


 8.0 mg/dL


(8.5-10.1)


 


Total Bilirubin


 


 


 0.7 mg/dL


(0.2-1.0)


 


Aspartate Amino Transf


(AST/SGOT) 


 


 117 U/L


(15-37)


 


Alanine Aminotransferase


(ALT/SGPT) 


 


 36 U/L (14-59) 





 


Alkaline Phosphatase


 


 


 100 U/L


()


 


Total Protein


 


 


 5.7 g/dL


(6.4-8.2)


 


Albumin


 


 


 1.9 g/dL


(3.4-5.0)


 


Albumin/Globulin Ratio   0.5 (1.0-1.7) 








Problem List


Problems


Medical Problems:


(1) Abdominal pain


Status: Acute  





(2) Anemia


Status: Acute  





(3) Elevated troponin


Status: Acute  





(4) Generalized weakness


Status: Acute  





(5) Hypoalbuminemia


Status: Acute  





(6) Liver cancer


Status: Acute  





(7) Nausea and vomiting


Status: Acute  





(8) Renal insufficiency


Status: Acute  








Assessment/Plan


liver mass


no surgical plans





EN STEVENS MD 1/30/20 0735:


SURGICAL PROGRESS NOTE


Assessment/Plan


Pt seen and examined.


Agree with Ms. Ratliff's note


Pt without new c/o


abd soft


no surgical plans.











ANTON RATLIFF            Jan 30, 2020 12:29


EN STEVENS MD             Jan 30, 2020 13:35

## 2020-01-30 NOTE — PDOC
Subjective:


Subjective:


Still doesn't like food, still wants a menu.


Occasional left-sided twinge of abd pain.


No vomiting, no diarrhea.


Wonders why she has a fever.





Objective:


Vital Signs:





                                   Vital Signs








  Date Time  Temp Pulse Resp B/P (MAP) Pulse Ox O2 Delivery O2 Flow Rate FiO2


 


1/30/20 09:01  89  127/70    


 


1/30/20 08:09     96 Room Air  


 


1/30/20 07:35 98.1  16     





 98.1       








Labs:





Laboratory Tests








Test


 1/29/20


13:00 1/29/20


17:50 1/30/20


03:45


 


Erythrocyte Sedimentation Rate 100   


 


Potassium Level 3.3 mmol/L   3.3 mmol/L 


 


Procalcitonin < 0.10 ng/mL   


 


Influenza Type A Antigen  Negative  


 


Influenza Type B Antigen  Negative  


 


White Blood Count   4.6 x10^3/uL 


 


Red Blood Count   3.15 x10^6/uL 


 


Hemoglobin   8.2 g/dL 


 


Hematocrit   24.9 % 


 


Mean Corpuscular Volume   79 fL 


 


Mean Corpuscular Hemoglobin   26 pg 


 


Mean Corpuscular Hemoglobin


Concent 


 


 33 g/dL 





 


Red Cell Distribution Width   17.3 % 


 


Platelet Count   241 x10^3/uL 


 


Neutrophils (%) (Auto)   73 % 


 


Lymphocytes (%) (Auto)   17 % 


 


Monocytes (%) (Auto)   9 % 


 


Eosinophils (%) (Auto)   1 % 


 


Basophils (%) (Auto)   0 % 


 


Neutrophils # (Auto)   3.4 x10^3/uL 


 


Lymphocytes # (Auto)   0.8 x10^3/uL 


 


Monocytes # (Auto)   0.4 x10^3/uL 


 


Eosinophils # (Auto)   0.0 x10^3/uL 


 


Basophils # (Auto)   0.0 x10^3/uL 


 


Sodium Level   142 mmol/L 


 


Chloride Level   108 mmol/L 


 


Carbon Dioxide Level   25 mmol/L 


 


Anion Gap   9 


 


Blood Urea Nitrogen   5 mg/dL 


 


Creatinine   0.6 mg/dL 


 


Estimated GFR


(Cockcroft-Gault) 


 


 98.8 





 


BUN/Creatinine Ratio   8 


 


Glucose Level   96 mg/dL 


 


Calcium Level   8.0 mg/dL 


 


Total Bilirubin   0.7 mg/dL 


 


Aspartate Amino Transf


(AST/SGOT) 


 


 117 U/L 





 


Alanine Aminotransferase


(ALT/SGPT) 


 


 36 U/L 





 


Alkaline Phosphatase   100 U/L 


 


Total Protein   5.7 g/dL 


 


Albumin   1.9 g/dL 


 


Albumin/Globulin Ratio   0.5 





  BLOOD CULTURE  Preliminary  


        NO GROWTH AFTER 3 DAYS





PE:





GEN: NAD


LUNGS: CTAB


HEART: RRR


ABD: S/ND/NT


NEURO/PSYCH: A & O 3





A/P:


Fever - ID following


HCC, possible hemorrhoid


MARCELLE - getting IV iron





--


Continue same per GI.





Hemodynamically unstable?:  No


Is patient in severe pain?:  No


Is NPO status required?:  No











DAHIANA NAVA         Jan 30, 2020 10:00

## 2020-01-31 VITALS — DIASTOLIC BLOOD PRESSURE: 68 MMHG | SYSTOLIC BLOOD PRESSURE: 139 MMHG

## 2020-01-31 VITALS — DIASTOLIC BLOOD PRESSURE: 67 MMHG | SYSTOLIC BLOOD PRESSURE: 134 MMHG

## 2020-01-31 VITALS — SYSTOLIC BLOOD PRESSURE: 139 MMHG | DIASTOLIC BLOOD PRESSURE: 66 MMHG

## 2020-01-31 RX ADMIN — CITALOPRAM HYDROBROMIDE SCH MG: 20 TABLET ORAL at 10:39

## 2020-01-31 RX ADMIN — CARVEDILOL SCH MG: 3.12 TABLET, FILM COATED ORAL at 10:38

## 2020-01-31 RX ADMIN — POTASSIUM BICARBONATE SCH MEQ: 782 TABLET, EFFERVESCENT ORAL at 10:37

## 2020-01-31 RX ADMIN — PIPERACILLIN SODIUM AND TAZOBACTAM SODIUM SCH MLS/HR: 3; .375 INJECTION, POWDER, LYOPHILIZED, FOR SOLUTION INTRAVENOUS at 06:25

## 2020-01-31 RX ADMIN — PANTOPRAZOLE SODIUM SCH MG: 40 TABLET, DELAYED RELEASE ORAL at 06:25

## 2020-01-31 RX ADMIN — POTASSIUM BICARBONATE SCH MEQ: 782 TABLET, EFFERVESCENT ORAL at 14:00

## 2020-01-31 RX ADMIN — Medication SCH MLS/HR: at 12:41

## 2020-01-31 RX ADMIN — Medication SCH CAP: at 10:38

## 2020-01-31 RX ADMIN — ACETAMINOPHEN SCH MG: 325 TABLET, FILM COATED ORAL at 10:39

## 2020-01-31 RX ADMIN — PIPERACILLIN SODIUM AND TAZOBACTAM SODIUM SCH MLS/HR: 3; .375 INJECTION, POWDER, LYOPHILIZED, FOR SOLUTION INTRAVENOUS at 00:35

## 2020-01-31 RX ADMIN — IPRATROPIUM BROMIDE AND ALBUTEROL SULFATE SCH ML: .5; 3 SOLUTION RESPIRATORY (INHALATION) at 06:55

## 2020-01-31 NOTE — SNU/HH DC
DISCHARGE ORDERS


DISCHARGE INFORMATION:


DISCHARGE DATE:  Jan 31, 2020


FINAL DIAGNOSIS


Problems


Medical Problems:


(1) Abdominal pain


Status: Acute  





(2) Anemia


Status: Acute  





(3) Elevated troponin


Status: Acute  





(4) Generalized weakness


Status: Acute  





(5) Hypoalbuminemia


Status: Acute  





(6) Liver cancer


Status: Acute  





(7) Nausea and vomiting


Status: Acute  





(8) Renal insufficiency


Status: Acute  








CONDITION ON DISCHARGE:  Guarded





CODE STATUS:


Code Status:  Full





SKILLED NURSING:


SNF STAY <30 DAYS:  Yes





HOSPICE:


HOSPICE:  No


HOSPICE EVAL & TREAT:  No





LTAC:


ADMIT TO LTAC:  No





POST DISCHARGE ORDERS:


ACTIVITY ORDERS:  Activity as tolerated


WEIGHT BEARING STATUS:  As tolerated


DIET AFTER DISCHARGE:  Cardiac


WOUND/INCISION CARE:  No wound care needed





CHECKS AFTER DISCHARGE:


CHECKS AFTER DISCHARGE:  Check blood press - daily





TREATMENT/EQUIPMENT ORDERS:


ADAPTIVE EQUIPMENT NEEDED:  None


Physical Therapy For:  Evalulation/Treatment


Occupational Therapy For:  Evaluation/Treatment


Speech Language Pathology For:  Evaluation/Treatment





DISCHARGE MEDICATIONS:


Home Meds


Active Scripts


Polyethylene Glycol 3350 (POLYETHYLENE GLYCOL 3350) 17 Gm Powd.pack, 17 GM PO 

PRN DAILY PRN for CONSTIPATION for 30 Days, #30 PKT


   Prov:PETER ATKINSON MD         1/31/20


Guaifenesin (GUAIFENESIN) 100 Mg/5 Ml Liquid, 200 MG PO PRN Q4HRS PRN for COUGH 

for 10 Days, #120 LIQUID


   Prov:PETER ATKINSON MD         1/31/20


Potassium Bicarbonate/Cit Ac (EFFER-K 20 MEQ TABLET EFF) 20 Meq Tablet.eff, 40 

MEQ PO TID for supplement for 14 Days, #84 TAB


   Prov:PETER ATKINSON MD         1/31/20


Ipratropium/Albuterol Sulfate (DUONEB 0.5-3(2.5) MG/3 ML) 3 Ml Ampul.neb, 3 ML 

NEB DAILY08 for copd for 30 Days, #120 EACH


   Prov:PETER ATKINSON MD         1/31/20


Lactobacillus Rhamnosus Gg (CULTURELLE) 1 Each Cap.sprink, 1 CAP PO BID for 

SUPPLEMENT for 14 Days, #28 CAP


   Prov:PETER ATKINSON MD         8/14/19


Docusate Sodium (COLACE) 100 Mg Capsule, 100 MG PO PRN BID PRN for HARD STOOLS 

for 14 Days, #14 CAP


   Prov:PETER ATKINSON MD         8/14/19


Aspirin (ASPIRIN EC) 325 Mg Tablet., 325 MG PO DAILYWBKFT for CVA for 30 Days,

#30 TAB.SR


   Prov:PETER ATKINSON MD         8/14/19


Amlodipine Besylate (AMLODIPINE BESYLATE) 5 Mg Tablet, 5 MG PO DAILY for BLOOD 

PRESSURE for 30 Days, #30 TAB


   Prov:PETER ATKINSON MD         8/14/19


Reported Medications


Citalopram Hydrobromide (CITALOPRAM HBR) 20 Mg Tablet, 1 TAB PO DAILY for mood, 

#30 TAB 5 Refills


   8/10/19


[atrovent hfa]   No Conflict Check, 17 MCG QID


   3/28/17


Alprazolam (XANAX) 0.5 Mg Tablet, 0.5-1 TAB PO BID for Anxiety, TAB


   3/28/17


Carvedilol (COREG  **) 3.125 Mg Tablet, 1 TAB PO BID for blood pressure, TAB


   3/28/17


Esomeprazole Magnesium (NEXIUM CAPSULE) 40 Mg Capsule.dr, 40 MG PO DAILYAC for 

nexium, CAP 0 Refills


   3/28/17


Atorvastatin Calcium (ATORVASTATIN CALCIUM) 40 Mg Tablet, 40 MG PO HS for 

cholesterol, TAB 0 Refills


   3/28/17


Discontinued Scripts


Potassium Chloride (KLOR-CON M20) 20 Meq Tab.er.prt, 20 MEQ PO DAILYWBKFT for 

SUPPLEMENT for 10 Days, #10 TAB.SR


   Prov:PETER ATKINSON MD         8/14/19











PETER ATKINSON MD          Jan 31, 2020 12:41

## 2020-01-31 NOTE — NUR
Patient not able to void despite another attempt. Patient bladder scanned per protocol and 
approximately 745ml PVR measured. Straight cath performed and 850 collected. Patient 
tolerated well and no other needs voiced at this time.

## 2020-01-31 NOTE — PDOC
Infectious Disease Note


Subjective


Subjective


no n/v/d/sob





ROS


ROS


cont fever





Vital Sign


Vital Signs





Vital Signs








  Date Time  Temp Pulse Resp B/P (MAP) Pulse Ox O2 Delivery O2 Flow Rate FiO2


 


1/31/20 07:00 99.4 98 18 139/66 (90) 94 Room Air  





 99.4       











Physical Exam


PHYSICAL EXAM


PHYSICAL EXAMINATION:


GENERAL:  Alert and oriented female, not in distress.


VITAL SIGNS:  Stable.  


HEENT:  NAD.


NECK:  Supple, no JVP, no lymphadenopathy.


LUNGS:  Clear.


HEART:  S1, S2 regular.


ABDOMEN:  Benign.


EXTREMITIES:  No edema or cyanosis.


SKIN:  Unremarkable.


NEUROLOGIC:  The patient is alert, awake and appropriate.  No focal neurologic


deficit.





Labs


Micro





Microbiology


1/26/20 Blood Culture - Preliminary, Resulted


          NO GROWTH AFTER 2 DAYS





Objective


Assessment


IMPRESSION:


1.  Fever, most likely related to the hemorrhage in the pericolic gutter,


although she could have influenza, she could have any other infection.  She is


not immunosuppressed that she is not on any medication.


2.  Hepatocellular carcinoma.


3.  Coronary artery disease.


4.  Chronic obstructive pulmonary disease.


5.  Debility.


6.  Retention.





Plan


Plan of Care


most likely pt has tumor fever


pt looks good (not toxic )


culture neg


consider ibuprofen and d/c antibiotics


prognosis poor











SARWAT CASAS MD               Jan 31, 2020 09:43

## 2020-01-31 NOTE — PDOC
Subjective:


Subjective:


Tolerating diet but wasn't hungry this morning.  Hasn't stooled.





Objective:


Objective:


Reviewed chart - urinary issues.


Vital Signs:





                                   Vital Signs








  Date Time  Temp Pulse Resp B/P (MAP) Pulse Ox O2 Delivery O2 Flow Rate FiO2


 


1/31/20 11:00 99.3 98 18 139/68 (91) 93 Room Air  





 99.3       











PE:





GEN: NAD


LUNGS: CTAB


HEART: RRR


ABD: soft, non-tender


NEURO/PSYCH: A & O 3, quiet, flat





A/P:


Fever - ID following


HCC, possible hemorrhage, MARCELLE - s/p IV iron, heme/onc following





--


Continue same per GI.





Hemodynamically unstable?:  No


Is patient in severe pain?:  No


Is NPO status required?:  No











DAHIANA NAVA         Jan 31, 2020 11:45

## 2020-01-31 NOTE — PDOC
PROGRESS NOTES


Chief Complaint


Chief Complaint


DISCHARGE DX ===============











STage 4 HEPATOCELLULAR CARCINOMA 


Elev AFP


FEvers, intermittent


CLean UA


Hypokalemia corrected


Anemia


Renal insufficiency


Hypoalbuminemia


Weakness - SNU agreebale


Falls


Dehydration resolved


Debility








consider ibuprofen 


d/c antibiotics


PRATER 


prognosis poor/ [facility] AT SNU








34 MIN D/C PLANNING





History of Present Illness


History of Present Illness


STill fevers


She does have some hemorrhagic component in her abd CT /HCC with mets that can 

cause low grade temp


SHe is agreeable prater removal, inserted over the weekend bec of retention


ID has escalated to zosyn from rocephin





transferred out of ICU 1/28


SNU slated


AFP elevated 300s, 





K better 3,3 from 2


On KCL IVF, fair pO 





PLAN:








INc kcl to 40 TID from BID


Iv zosyn per ID D/C 1/31 


follow BC


NEEDS prater, STILL RETAINING 1/31 


Mina keep or dc tele


SNU slated 1/31





Vitals


Vitals





Vital Signs








  Date Time  Temp Pulse Resp B/P (MAP) Pulse Ox O2 Delivery O2 Flow Rate FiO2


 


1/31/20 06:58     95 Room Air  


 


1/31/20 03:20 99.6 91 16 134/67 (89)    





 99.6       











Physical Exam


Physical Exam


PHYSICAL EXAMINATION:


GENERAL:  Alert and oriented female, not in distress.


VITAL SIGNS:  Stable.  


HEENT:  NAD.


NECK:  Supple, no JVP, no lymphadenopathy.


LUNGS:  Clear.


HEART:  S1, S2 regular.


ABDOMEN:  Benign.


EXTREMITIES:  No edema or cyanosis.


SKIN:  Unremarkable.


NEUROLOGIC:  The patient is alert, awake and appropriate.  No focal neurologic


deficit.


General:  Alert, Oriented X3, Cooperative, mild distress


Heart:  Regular rate, Normal S1, Normal S2, Other (2/6 systolic murmur )


Lungs:  Clear


Abdomen:  Soft, No tenderness


Extremities:  No cyanosis, No edema, Normal pulses


Skin:  No significant lesion





Assessment and Plan


Assessmemt and Plan


Problems


Medical Problems:


(1) Abdominal pain


Status: Acute  





(2) Anemia


Status: Acute  





(3) Elevated troponin


Status: Acute  





(4) Generalized weakness


Status: Acute  





(5) Hypoalbuminemia


Status: Acute  





(6) Liver cancer


Status: Acute  





(7) Nausea and vomiting


Status: Acute  





(8) Renal insufficiency


Status: Acute  











Comment


Review of Relevant


I have reviewed the following items ignacio (where applicable) has been applied.


Labs





Laboratory Tests








Test


 1/29/20


13:00 1/29/20


17:50 1/30/20


03:45


 


Erythrocyte Sedimentation Rate 100 (0-25)   


 


Potassium Level


 3.3 mmol/L


(3.5-5.1) 


 3.3 mmol/L


(3.5-5.1)


 


Procalcitonin


 < 0.10 ng/mL


(0.00-0.10) 


 





 


Influenza Type A Antigen


 


 Negative


(NEGATIVE) 





 


Influenza Type B Antigen


 


 Negative


(NEGATIVE) 





 


White Blood Count


 


 


 4.6 x10^3/uL


(4.0-11.0)


 


Red Blood Count


 


 


 3.15 x10^6/uL


(3.50-5.40)


 


Hemoglobin


 


 


 8.2 g/dL


(12.0-15.5)


 


Hematocrit


 


 


 24.9 %


(36.0-47.0)


 


Mean Corpuscular Volume   79 fL () 


 


Mean Corpuscular Hemoglobin   26 pg (25-35) 


 


Mean Corpuscular Hemoglobin


Concent 


 


 33 g/dL


(31-37)


 


Red Cell Distribution Width


 


 


 17.3 %


(11.5-14.5)


 


Platelet Count


 


 


 241 x10^3/uL


(140-400)


 


Neutrophils (%) (Auto)   73 % (31-73) 


 


Lymphocytes (%) (Auto)   17 % (24-48) 


 


Monocytes (%) (Auto)   9 % (0-9) 


 


Eosinophils (%) (Auto)   1 % (0-3) 


 


Basophils (%) (Auto)   0 % (0-3) 


 


Neutrophils # (Auto)


 


 


 3.4 x10^3/uL


(1.8-7.7)


 


Lymphocytes # (Auto)


 


 


 0.8 x10^3/uL


(1.0-4.8)


 


Monocytes # (Auto)


 


 


 0.4 x10^3/uL


(0.0-1.1)


 


Eosinophils # (Auto)


 


 


 0.0 x10^3/uL


(0.0-0.7)


 


Basophils # (Auto)


 


 


 0.0 x10^3/uL


(0.0-0.2)


 


Sodium Level


 


 


 142 mmol/L


(136-145)


 


Chloride Level


 


 


 108 mmol/L


()


 


Carbon Dioxide Level


 


 


 25 mmol/L


(21-32)


 


Anion Gap   9 (6-14) 


 


Blood Urea Nitrogen   5 mg/dL (7-20) 


 


Creatinine


 


 


 0.6 mg/dL


(0.6-1.0)


 


Estimated GFR


(Cockcroft-Gault) 


 


 98.8 





 


BUN/Creatinine Ratio   8 (6-20) 


 


Glucose Level


 


 


 96 mg/dL


(70-99)


 


Calcium Level


 


 


 8.0 mg/dL


(8.5-10.1)


 


Total Bilirubin


 


 


 0.7 mg/dL


(0.2-1.0)


 


Aspartate Amino Transf


(AST/SGOT) 


 


 117 U/L


(15-37)


 


Alanine Aminotransferase


(ALT/SGPT) 


 


 36 U/L (14-59) 





 


Alkaline Phosphatase


 


 


 100 U/L


()


 


Total Protein


 


 


 5.7 g/dL


(6.4-8.2)


 


Albumin


 


 


 1.9 g/dL


(3.4-5.0)


 


Albumin/Globulin Ratio   0.5 (1.0-1.7) 








Microbiology


1/29/20 Blood Culture - Preliminary, Resulted


          NO GROWTH AFTER 1 DAY


Medications





Current Medications


Sodium Chloride 1,000 ml @  1,000 mls/hr Q1H IV  Last administered on 1/26/20at 

12:51;  Start 1/26/20 at 12:51;  Stop 1/26/20 at 13:50;  Status DC


Ondansetron HCl (Zofran) 4 mg 1X  ONCE IV ;  Start 1/26/20 at 13:00;  Stop 

1/26/20 at 13:01;  Status DC


Sodium Chloride (Normal Saline Flush) 3 ml QSHIFT  PRN IV AFTER MEDS AND BLOOD 

DRAWS;  Start 1/26/20 at 14:45


Sodium Chloride 1,000 ml @  100 mls/hr Q10H IV  Last administered on 1/28/20at 

07:51;  Start 1/26/20 at 14:40;  Stop 1/28/20 at 11:38;  Status DC


Ondansetron HCl (Zofran) 4 mg PRN Q4HRS  PRN IV NAUSEA/VOMITING;  Start 1/26/20 

at 14:45


Acetaminophen (Tylenol) 650 mg PRN Q4HRS  PRN GT TEMP OVER 100.4F OR MILD PAIN 

Last administered on 1/30/20at 21:08;  Start 1/26/20 at 14:45


Clonidine HCl (Catapres) 0.1 mg PRN Q6HRS  PRN PO SBP>160 OR DBP>90;  Start 

1/26/20 at 14:45


Docusate Sodium (Colace) 100 mg PRN BID  PRN PO CONSTIPATION;  Start 1/26/20 at 

14:45


Albuterol/ Ipratropium (Duoneb) 3 ml Q4HRS NEB  Last administered on 1/28/20at 

07:51;  Start 1/26/20 at 16:00;  Stop 1/28/20 at 13:31;  Status DC


Guaifenesin (Robitussin) 200 mg PRN Q4HRS  PRN PO COUGH;  Start 1/26/20 at 14:45


Lorazepam (Ativan) 0.5 mg PRN Q4HRS  PRN PO ANXIETY / AGITATION;  Start 1/26/20 

at 14:45;  Stop 1/28/20 at 11:18;  Status DC


Enoxaparin Sodium (Lovenox 40mg Syringe) 40 mg Q24H SQ  Last administered on 

1/27/20at 23:04;  Start 1/26/20 at 21:00;  Stop 1/28/20 at 11:38;  Status DC


Potassium Chloride/Water 100 ml @  100 mls/hr Q1H IV ;  Start 1/26/20 at 14:45; 

Stop 1/26/20 at 14:49;  Status DC


Alprazolam (Xanax) 0.5 mg PRN BID  PRN PO ANXIETY / AGITATION (SEVERE) Last 

administered on 1/30/20at 21:09;  Start 1/26/20 at 21:00


Amlodipine Besylate (Norvasc) 5 mg DAILY PO  Last administered on 1/30/20at 

09:01;  Start 1/26/20 at 16:00


Carvedilol (Coreg) 3.125 mg BIDWMEALS PO  Last administered on 1/30/20at 18:24; 

Start 1/26/20 at 17:00


Citalopram Hydrobromide (CeleXA) 20 mg DAILY PO  Last administered on 1/30/20at 

09:00;  Start 1/26/20 at 16:00


Lactobacillus Rhamnosus (Culturelle) 1 cap BID PO  Last administered on 

1/30/20at 21:08;  Start 1/26/20 at 21:00


Potassium Chloride (Klor-Con) 20 meq DAILYWBKFT PO  Last administered on 

1/28/20at 08:48;  Start 1/26/20 at 17:00;  Stop 1/28/20 at 11:38;  Status DC


Sodium Chloride 1,000 ml @  125 mls/hr Q8H IV ;  Start 1/26/20 at 14:46;  Stop 

1/26/20 at 15:09;  Status DC


Potassium Chloride/Water 100 ml @  100 mls/hr Q1H IV  Last administered on 

1/26/20at 23:07;  Start 1/26/20 at 16:00;  Stop 1/26/20 at 19:59;  Status DC


Alprazolam (Xanax) 0.25 mg PRN BID  PRN PO ANXIETY / AGITATION (MODERATE);  

Start 1/26/20 at 15:00


Pantoprazole Sodium (PROTONIX VIAL for IV PUSH) 40 mg DAILYAC IVP  Last 

administered on 1/28/20at 07:51;  Start 1/27/20 at 07:30;  Stop 1/28/20 at 

11:38;  Status DC


Potassium Bicarbonate (Potassium Effervescent Tablet) 40 meq 1X  ONCE PEG  Last 

administered on 1/27/20at 09:43;  Start 1/27/20 at 08:45;  Stop 1/27/20 at 

08:46;  Status DC


Ceftriaxone Sodium (Rocephin) 1 gm Q24H IVP  Last administered on 1/28/20at 

21:08;  Start 1/27/20 at 22:30;  Stop 1/29/20 at 11:34;  Status DC


Potassium Chloride (Klor-Con) 40 meq 1X  ONCE PO  Last administered on 1/28/20at

07:37;  Start 1/28/20 at 06:30;  Stop 1/28/20 at 06:31;  Status DC


Potassium Chloride (Klor-Con) 40 meq DAILYWBKFT PO  Last administered on 

1/28/20at 14:07;  Start 1/28/20 at 11:45;  Stop 1/29/20 at 05:52;  Status DC


Potassium Chloride/Dextrose/ Sod Cl 1,000 ml @  75 mls/hr [id number] IV  Last 

administered on 1/29/20at 17:44;  Start 1/28/20 at 12:00;  Stop 1/30/20 at 08:

38;  Status DC


Pantoprazole Sodium (Protonix) 40 mg DAILYAC PO  Last administered on 1/31/20at 

06:25;  Start 1/29/20 at 07:30


Albuterol/ Ipratropium (Duoneb) 3 ml DAILY08 NEB  Last administered on 1/31/20at

06:55;  Start 1/29/20 at 08:00


Albuterol Sulfate (Ventolin Neb Soln) 2.5 mg PRN Q4HRS  PRN NEB SHORTNESS OF 

BREATH;  Start 1/28/20 at 13:30


Enoxaparin Sodium (Lovenox 40mg Syringe) 40 mg Q24H SQ  Last administered on 

1/28/20at 16:29;  Start 1/28/20 at 15:00;  Stop 1/29/20 at 07:40;  Status DC


Potassium Bicarbonate (Potassium Effervescent Tablet) 40 meq BIDWMEALS PO  Last 

administered on 1/29/20at 17:32;  Start 1/29/20 at 08:00;  Stop 1/30/20 at 

08:39;  Status DC


Potassium Chloride (Klor-Con) 40 meq 1X  ONCE PO ;  Start 1/29/20 at 06:00;  

Stop 1/29/20 at 05:58;  Status DC


Potassium Bicarbonate (Potassium Effervescent Tablet) 40 meq 1X  ONCE PO  Last 

administered on 1/29/20at 06:05;  Start 1/29/20 at 06:00;  Stop 1/29/20 at 

06:01;  Status DC


Ferrous Sulfate (Feosol) 325 mg DAILYWBKFT PO  Last administered on 1/30/20at 

08:58;  Start 1/29/20 at 12:00;  Stop 1/30/20 at 10:01;  Status DC


Iron Sucrose 300 mg/Sodium Chloride 265 ml @  88.333 mls/ hr DAILY@1100 IV  Last

administered on 1/30/20at 10:49;  Start 1/29/20 at 11:00;  Stop 1/31/20 at 13:59


Diphenhydramine HCl (Benadryl) 25 mg DAILY@1030 PO  Last administered on 

1/30/20at 10:48;  Start 1/29/20 at 10:30;  Stop 1/31/20 at 10:31


Acetaminophen (Tylenol) 650 mg DAILY@1030 PO  Last administered on 1/30/20at 

10:48;  Start 1/29/20 at 10:30;  Stop 1/31/20 at 10:31


Piperacillin Sod/ Tazobactam Sod 3.375 gm/Sodium Chloride 50 ml @  100 mls/hr 

Q6HRS IV ;  Start 1/29/20 at 12:00;  Stop 1/29/20 at 12:12;  Status DC


Piperacillin Sod/ Tazobactam Sod 3.375 gm/Sodium Chloride 50 ml @  100 mls/hr 

Q6HRS IV  Last administered on 1/31/20at 06:25;  Start 1/29/20 at 18:00


Potassium Bicarbonate (Potassium Effervescent Tablet) 40 meq TID PO  Last 

administered on 1/30/20at 21:08;  Start 1/30/20 at 09:00





Active Scripts


Active


Culturelle (Lactobacillus Rhamnosus Gg) 1 Each Cap.sprink 1 Cap PO BID 14 Days


Colace (Docusate Sodium) 100 Mg Capsule 100 Mg PO PRN BID PRN 14 Days


Klor-Con M20 (Potassium Chloride) 20 Meq Tab.er.prt 20 Meq PO DAILYWBKFT 10 Days


Aspirin Ec (Aspirin) 325 Mg Tablet. 325 Mg PO DAILYWBKFT 30 Days


Amlodipine Besylate 5 Mg Tablet 5 Mg PO DAILY 30 Days


Reported


Citalopram Hbr (Citalopram Hydrobromide) 20 Mg Tablet 1 Tab PO DAILY


[atrovent hfa]   17 Mcg  QID


Xanax (Alprazolam) 0.5 Mg Tablet 0.5-1 Tab PO BID


Coreg  ** (Carvedilol) 3.125 Mg Tablet 1 Tab PO BID


Nexium Capsule (Esomeprazole Magnesium) 40 Mg Capsule. 40 Mg PO DAILYAC


Atorvastatin Calcium 40 Mg Tablet 40 Mg PO HS


Vitals/I & O





Vital Sign - Last 24 Hours








 1/30/20 1/30/20 1/30/20 1/30/20





 10:40 14:38 18:24 19:00


 


Temp 98.8 98.1  98.1





 98.8 98.1  98.1


 


Pulse 91 83 83 96


 


Resp 16 16  20


 


B/P (MAP) 124/64 (84) 131/68 (89) 131/68 136/62 (86)


 


Pulse Ox 94 95  97


 


O2 Delivery Room Air Room Air  Room Air


 


    





    





 1/30/20 1/30/20 1/30/20 1/31/20





 20:30 20:55 23:55 03:20


 


Temp  102.0 99.3 99.6





  102.0 99.3 99.6


 


Pulse   90 91


 


Resp   18 16


 


B/P (MAP)   125/66 (85) 134/67 (89)


 


Pulse Ox   92 93


 


O2 Delivery Room Air  Room Air Room Air


 


    





    





 1/31/20   





 06:58   


 


Pulse Ox 95   


 


O2 Delivery Room Air   














Intake and Output   


 


 1/30/20 1/30/20 1/31/20





 15:00 23:00 07:00


 


Intake Total  50 ml 965 ml


 


Output Total 1600 ml  750 ml


 


Balance -1600 ml 50 ml 215 ml











Nutrition Consultation


Dietary Evaluation:


Recommendations by RD:  Dietary education by RD, Increase Calorie Intake, 

Protein supplementation


Comments:  


REC cardiac w/oral supplements





REC Ensure clear q day


Expected Outcomes/Goals:  


diet advancement- met





new goal: to meet >75% est nutr needs





Malnutrition Findings:


Food and Nutrition Intake (Sev:  <50% est energy req 5days


Weight Status:  Appropriate





Hemodynamically unstable?:  No


Is patient in severe pain?:  No


Is NPO status required?:  No











PETER ATKINSON MD          Jan 31, 2020 09:15

## 2020-01-31 NOTE — PDOC
SUBJECTIVE


Subjective


S: still occas ab pain, but still w/ fever





O: Gen: NAD, resting in bed, tired


Psych: pleasant mood and affect





Labs: Hb 8.2, ferr 67, sat of 5%, (Hb had been 12 in Aug)





A/P: She is a 69-year-old female with history of hepatocellular carcinoma 

locally advanced status post Y 90 last September who is admitted with nausea and

vomiting and frequent stools and is improved but currently has had fever.





Fever: On antibiotics, defer to primary





History of stroke and prior aspirin and Plavix prescribed, has not been on these





Elevated troponin: Improved





Elevated creatinine: Improved 





Hepatocellular carcinoma: If performance status improved could consider po 

chemotherapy in follow-up





Concern for hemorrhage abdominally: have held Lovenox and getting IV Fe, not a 

surgical candidate currently





Electrolytes: Per primary 





disposition: After clinical improvement, may need rehabilitation? Appreciate 

multidisciplinary care





Thank you kindly, and please do not hesitate to call with any further questions.





OBJECTIVE


Vital Signs





Vital Signs








  Date Time  Temp Pulse Resp B/P (MAP) Pulse Ox O2 Delivery O2 Flow Rate FiO2


 


1/31/20 07:00 99.4 98 18 139/66 (90) 94 Room Air  





 99.4       


 


1/31/20 06:58     95 Room Air  


 


1/31/20 03:20 99.6 91 16 134/67 (89) 93 Room Air  





 99.6       


 


1/30/20 23:55 99.3 90 18 125/66 (85) 92 Room Air  





 99.3       


 


1/30/20 20:55 102.0       





 102.0       


 


1/30/20 20:30      Room Air  


 


1/30/20 19:00 98.1 96 20 136/62 (86) 97 Room Air  





 98.1       


 


1/30/20 18:24  83  131/68    


 


1/30/20 14:38 98.1 83 16 131/68 (89) 95 Room Air  





 98.1       


 


1/30/20 10:40 98.8 91 16 124/64 (84) 94 Room Air  





 98.8       








I & O











Intake and Output 


 


 1/31/20





 07:00


 


Intake Total 1015 ml


 


Output Total 2350 ml


 


Balance -1335 ml


 


 


 


Intake Oral 550 ml


 


IV Total 465 ml


 


Output Urine Total 2350 ml


 


# Voids 1











Nutrition Consultation


Dietary Evaluation:


Recommendations by RD:  Dietary education by RD, Increase Calorie Intake, 

Protein supplementation


Comments:  


REC cardiac w/oral supplements





REC Ensure clear q day


Expected Outcomes/Goals:  


diet advancement- met





new goal: to meet >75% est nutr needs





Malnutrition Findings:


Food and Nutrition Intake (Sev:  <50% est energy req 5days


Weight Status:  Appropriate





Hemodynamically unstable?:  No


Is patient in severe pain?:  No


Is NPO status required?:  No











NATALIA SCHULTZ MD           Jan 31, 2020 09:58

## 2020-01-31 NOTE — PDOC3
Discharge Summary


Date of Admission:  Jan 16, 2020


Date of Discharge:  Jan 31, 2020


Follow-Up:  1-2 days


Admitting Diagnosis comment:


DISCHARGE DX ===============











STage 4 HEPATOCELLULAR CARCINOMA 


Elev AFP


FEvers, intermittent


CLean UA


Hypokalemia corrected


Anemia


Renal insufficiency


Hypoalbuminemia


Weakness - SNU agreebale


Falls


Dehydration resolved


Debility








consider ibuprofen 


d/c antibiotics


PRATER 


prognosis poor/ [facility] AT SNU








34 MIN D/C PLANNING





History of Present Illness


History of Present Illness


STill fevers


She does have some hemorrhagic component in her abd CT /HCC with mets that can 

cause low grade temp


SHe is agreeable prater removal, inserted over the weekend bec of retention


ID has escalated to zosyn from rocephin





transferred out of ICU 1/28


SNU slated


AFP elevated 300s, 





K better 3,3 from 2


On KCL IVF, fair pO 





PLAN:








INc kcl to 40 TID from BID


Iv zosyn per ID D/C 1/31 


follow BC


NEEDS prater, STILL RETAINING 1/31 


Mina keep or dc tele


SNU slated 1/31





Vitals


Vitals





Vital Signs








  Date Time  Temp Pulse Resp B/P (MAP) Pulse Ox O2 Delivery O2 Flow Rate FiO2


 


1/31/20 06:58     95 Room Air  


 


1/31/20 03:20 99.6 91 16 134/67 (89)    





 99.6       











Physical Exam


Physical Exam


PHYSICAL EXAMINATION:


GENERAL:  Alert and oriented female, not in distress.


VITAL SIGNS:  Stable.  


HEENT:  NAD.


NECK:  Supple, no JVP, no lymphadenopathy.


LUNGS:  Clear.


HEART:  S1, S2 regular.


ABDOMEN:  Benign.


EXTREMITIES:  No edema or cyanosis.


SKIN:  Unremarkable.


NEUROLOGIC:  The patient is alert, awake and appropriate.  No focal neurologic


deficit.


General:  Alert, Oriented X3, Cooperative, mild distress


Heart:  Regular rate, Normal S1, Normal S2, Other (2/6 systolic murmur )


Lungs:  Clear


Abdomen:  Soft, No tenderness


Extremities:  No cyanosis, No edema, Normal pulses


Skin:  No significant lesion


FINAL DIAGNOSIS


Problems


Medical Problems:


(1) Abdominal pain


Status: Acute  





(2) Anemia


Status: Acute  





(3) Elevated troponin


Status: Acute  





(4) Generalized weakness


Status: Acute  





(5) Hypoalbuminemia


Status: Acute  





(6) Liver cancer


Status: Acute  





(7) Nausea and vomiting


Status: Acute  





(8) Renal insufficiency


Status: Acute  








Brief Hospital Course


Ms. Killian  is a 69 old [sex] who presented with [ ]


CONDITION AT DISCHARGE:  Comment (POOR PROGNOSIS)


Discharge Medications





Current Medications


Sodium Chloride 1,000 ml @  1,000 mls/hr Q1H IV  Last administered on 1/26/20at 

12:51;  Start 1/26/20 at 12:51;  Stop 1/26/20 at 13:50;  Status DC


Ondansetron HCl (Zofran) 4 mg 1X  ONCE IV ;  Start 1/26/20 at 13:00;  Stop 

1/26/20 at 13:01;  Status DC


Sodium Chloride (Normal Saline Flush) 3 ml QSHIFT  PRN IV AFTER MEDS AND BLOOD 

DRAWS;  Start 1/26/20 at 14:45


Sodium Chloride 1,000 ml @  100 mls/hr Q10H IV  Last administered on 1/28/20at 

07:51;  Start 1/26/20 at 14:40;  Stop 1/28/20 at 11:38;  Status DC


Ondansetron HCl (Zofran) 4 mg PRN Q4HRS  PRN IV NAUSEA/VOMITING;  Start 1/26/20 

at 14:45


Acetaminophen (Tylenol) 650 mg PRN Q4HRS  PRN GT TEMP OVER 100.4F OR MILD PAIN 

Last administered on 1/30/20at 21:08;  Start 1/26/20 at 14:45


Clonidine HCl (Catapres) 0.1 mg PRN Q6HRS  PRN PO SBP>160 OR DBP>90;  Start 

1/26/20 at 14:45


Docusate Sodium (Colace) 100 mg PRN BID  PRN PO HARD STOOLS;  Start 1/26/20 at 

14:45


Albuterol/ Ipratropium (Duoneb) 3 ml Q4HRS NEB  Last administered on 1/28/20at 

07:51;  Start 1/26/20 at 16:00;  Stop 1/28/20 at 13:31;  Status DC


Guaifenesin (Robitussin) 200 mg PRN Q4HRS  PRN PO COUGH;  Start 1/26/20 at 14:45


Lorazepam (Ativan) 0.5 mg PRN Q4HRS  PRN PO ANXIETY / AGITATION;  Start 1/26/20 

at 14:45;  Stop 1/28/20 at 11:18;  Status DC


Enoxaparin Sodium (Lovenox 40mg Syringe) 40 mg Q24H SQ  Last administered on 

1/27/20at 23:04;  Start 1/26/20 at 21:00;  Stop 1/28/20 at 11:38;  Status DC


Potassium Chloride/Water 100 ml @  100 mls/hr Q1H IV ;  Start 1/26/20 at 14:45; 

Stop 1/26/20 at 14:49;  Status DC


Alprazolam (Xanax) 0.5 mg PRN BID  PRN PO ANXIETY / AGITATION (SEVERE) Last 

administered on 1/30/20at 21:09;  Start 1/26/20 at 21:00


Amlodipine Besylate (Norvasc) 5 mg DAILY PO  Last administered on 1/31/20at 

10:39;  Start 1/26/20 at 16:00


Carvedilol (Coreg) 3.125 mg BIDWMEALS PO  Last administered on 1/31/20at 10:38; 

Start 1/26/20 at 17:00


Citalopram Hydrobromide (CeleXA) 20 mg DAILY PO  Last administered on 1/31/20at 

10:39;  Start 1/26/20 at 16:00


Lactobacillus Rhamnosus (Culturelle) 1 cap BID PO  Last administered on 

1/31/20at 10:38;  Start 1/26/20 at 21:00


Potassium Chloride (Klor-Con) 20 meq DAILYWBKFT PO  Last administered on 

1/28/20at 08:48;  Start 1/26/20 at 17:00;  Stop 1/28/20 at 11:38;  Status DC


Sodium Chloride 1,000 ml @  125 mls/hr Q8H IV ;  Start 1/26/20 at 14:46;  Stop 

1/26/20 at 15:09;  Status DC


Potassium Chloride/Water 100 ml @  100 mls/hr Q1H IV  Last administered on 

1/26/20at 23:07;  Start 1/26/20 at 16:00;  Stop 1/26/20 at 19:59;  Status DC


Alprazolam (Xanax) 0.25 mg PRN BID  PRN PO ANXIETY / AGITATION (MODERATE);  

Start 1/26/20 at 15:00


Pantoprazole Sodium (PROTONIX VIAL for IV PUSH) 40 mg DAILYAC IVP  Last 

administered on 1/28/20at 07:51;  Start 1/27/20 at 07:30;  Stop 1/28/20 at 

11:38;  Status DC


Potassium Bicarbonate (Potassium Effervescent Tablet) 40 meq 1X  ONCE PEG  Last 

administered on 1/27/20at 09:43;  Start 1/27/20 at 08:45;  Stop 1/27/20 at 

08:46;  Status DC


Ceftriaxone Sodium (Rocephin) 1 gm Q24H IVP  Last administered on 1/28/20at 

21:08;  Start 1/27/20 at 22:30;  Stop 1/29/20 at 11:34;  Status DC


Potassium Chloride (Klor-Con) 40 meq 1X  ONCE PO  Last administered on 1/28/20at

07:37;  Start 1/28/20 at 06:30;  Stop 1/28/20 at 06:31;  Status DC


Potassium Chloride (Klor-Con) 40 meq DAILYWBKFT PO  Last administered on 

1/28/20at 14:07;  Start 1/28/20 at 11:45;  Stop 1/29/20 at 05:52;  Status DC


Potassium Chloride/Dextrose/ Sod Cl 1,000 ml @  75 mls/hr [id number] IV  Last 

administered on 1/29/20at 17:44;  Start 1/28/20 at 12:00;  Stop 1/30/20 at 

08:38;  Status DC


Pantoprazole Sodium (Protonix) 40 mg DAILYAC PO  Last administered on 1/31/20at 

06:25;  Start 1/29/20 at 07:30


Albuterol/ Ipratropium (Duoneb) 3 ml DAILY08 NEB  Last administered on 1/31/20at

06:55;  Start 1/29/20 at 08:00


Albuterol Sulfate (Ventolin Neb Soln) 2.5 mg PRN Q4HRS  PRN NEB SHORTNESS OF 

BREATH;  Start 1/28/20 at 13:30


Enoxaparin Sodium (Lovenox 40mg Syringe) 40 mg Q24H SQ  Last administered on 

1/28/20at 16:29;  Start 1/28/20 at 15:00;  Stop 1/29/20 at 07:40;  Status DC


Potassium Bicarbonate (Potassium Effervescent Tablet) 40 meq BIDWMEALS PO  Last 

administered on 1/29/20at 17:32;  Start 1/29/20 at 08:00;  Stop 1/30/20 at 

08:39;  Status DC


Potassium Chloride (Klor-Con) 40 meq 1X  ONCE PO ;  Start 1/29/20 at 06:00;  

Stop 1/29/20 at 05:58;  Status DC


Potassium Bicarbonate (Potassium Effervescent Tablet) 40 meq 1X  ONCE PO  Last 

administered on 1/29/20at 06:05;  Start 1/29/20 at 06:00;  Stop 1/29/20 at 

06:01;  Status DC


Ferrous Sulfate (Feosol) 325 mg DAILYWBKFT PO  Last administered on 1/30/20at 

08:58;  Start 1/29/20 at 12:00;  Stop 1/30/20 at 10:01;  Status DC


Iron Sucrose 300 mg/Sodium Chloride 265 ml @  88.333 mls/ hr DAILY@1100 IV  Last

administered on 1/30/20at 10:49;  Start 1/29/20 at 11:00;  Stop 1/31/20 at 13:59


Diphenhydramine HCl (Benadryl) 25 mg DAILY@1030 PO  Last administered on 

1/31/20at 10:38;  Start 1/29/20 at 10:30;  Stop 1/31/20 at 10:31;  Status DC


Acetaminophen (Tylenol) 650 mg DAILY@1030 PO  Last administered on 1/31/20at 

10:39;  Start 1/29/20 at 10:30;  Stop 1/31/20 at 10:31;  Status DC


Piperacillin Sod/ Tazobactam Sod 3.375 gm/Sodium Chloride 50 ml @  100 mls/hr 

Q6HRS IV ;  Start 1/29/20 at 12:00;  Stop 1/29/20 at 12:12;  Status DC


Piperacillin Sod/ Tazobactam Sod 3.375 gm/Sodium Chloride 50 ml @  100 mls/hr 

Q6HRS IV  Last administered on 1/31/20at 06:25;  Start 1/29/20 at 18:00


Potassium Bicarbonate (Potassium Effervescent Tablet) 40 meq TID PO  Last 

administered on 1/31/20at 10:37;  Start 1/30/20 at 09:00


Polyethylene Glycol (miraLAX PACKET) 17 gm PRN DAILY  PRN PO CONSTIPATION;  

Start 1/31/20 at 11:45





Active Scripts


Active


Culturelle (Lactobacillus Rhamnosus Gg) 1 Each Cap.sprink 1 Cap PO BID 14 Days


Colace (Docusate Sodium) 100 Mg Capsule 100 Mg PO PRN BID PRN 14 Days


Klor-Con M20 (Potassium Chloride) 20 Meq Tab.er.prt 20 Meq PO DAILYWBKFT 10 Days


Aspirin Ec (Aspirin) 325 Mg Tablet.dr 325 Mg PO DAILYWBKFT 30 Days


Amlodipine Besylate 5 Mg Tablet 5 Mg PO DAILY 30 Days


Reported


Citalopram Hbr (Citalopram Hydrobromide) 20 Mg Tablet 1 Tab PO DAILY


[atrovent hfa]   17 Mcg  QID


Xanax (Alprazolam) 0.5 Mg Tablet 0.5-1 Tab PO BID


Coreg  ** (Carvedilol) 3.125 Mg Tablet 1 Tab PO BID


Nexium Capsule (Esomeprazole Magnesium) 40 Mg Capsule.dr 40 Mg PO DAILYAC


Atorvastatin Calcium 40 Mg Tablet 40 Mg PO HS


Vital Signs





Vital Signs








  Date Time  Temp Pulse Resp B/P (MAP) Pulse Ox O2 Delivery O2 Flow Rate FiO2


 


1/31/20 11:00 99.3 98 18 139/68 (91) 93 Room Air  





 99.3       








Labs





Laboratory Tests








Test


 1/29/20


13:00 1/29/20


17:50 1/30/20


03:45


 


Erythrocyte Sedimentation Rate 100 (0-25)   


 


Potassium Level


 3.3 mmol/L


(3.5-5.1) 


 3.3 mmol/L


(3.5-5.1)


 


Procalcitonin


 < 0.10 ng/mL


(0.00-0.10) 


 





 


Influenza Type A Antigen


 


 Negative


(NEGATIVE) 





 


Influenza Type B Antigen


 


 Negative


(NEGATIVE) 





 


White Blood Count


 


 


 4.6 x10^3/uL


(4.0-11.0)


 


Red Blood Count


 


 


 3.15 x10^6/uL


(3.50-5.40)


 


Hemoglobin


 


 


 8.2 g/dL


(12.0-15.5)


 


Hematocrit


 


 


 24.9 %


(36.0-47.0)


 


Mean Corpuscular Volume   79 fL () 


 


Mean Corpuscular Hemoglobin   26 pg (25-35) 


 


Mean Corpuscular Hemoglobin


Concent 


 


 33 g/dL


(31-37)


 


Red Cell Distribution Width


 


 


 17.3 %


(11.5-14.5)


 


Platelet Count


 


 


 241 x10^3/uL


(140-400)


 


Neutrophils (%) (Auto)   73 % (31-73) 


 


Lymphocytes (%) (Auto)   17 % (24-48) 


 


Monocytes (%) (Auto)   9 % (0-9) 


 


Eosinophils (%) (Auto)   1 % (0-3) 


 


Basophils (%) (Auto)   0 % (0-3) 


 


Neutrophils # (Auto)


 


 


 3.4 x10^3/uL


(1.8-7.7)


 


Lymphocytes # (Auto)


 


 


 0.8 x10^3/uL


(1.0-4.8)


 


Monocytes # (Auto)


 


 


 0.4 x10^3/uL


(0.0-1.1)


 


Eosinophils # (Auto)


 


 


 0.0 x10^3/uL


(0.0-0.7)


 


Basophils # (Auto)


 


 


 0.0 x10^3/uL


(0.0-0.2)


 


Sodium Level


 


 


 142 mmol/L


(136-145)


 


Chloride Level


 


 


 108 mmol/L


()


 


Carbon Dioxide Level


 


 


 25 mmol/L


(21-32)


 


Anion Gap   9 (6-14) 


 


Blood Urea Nitrogen   5 mg/dL (7-20) 


 


Creatinine


 


 


 0.6 mg/dL


(0.6-1.0)


 


Estimated GFR


(Cockcroft-Gault) 


 


 98.8 





 


BUN/Creatinine Ratio   8 (6-20) 


 


Glucose Level


 


 


 96 mg/dL


(70-99)


 


Calcium Level


 


 


 8.0 mg/dL


(8.5-10.1)


 


Total Bilirubin


 


 


 0.7 mg/dL


(0.2-1.0)


 


Aspartate Amino Transf


(AST/SGOT) 


 


 117 U/L


(15-37)


 


Alanine Aminotransferase


(ALT/SGPT) 


 


 36 U/L (14-59) 





 


Alkaline Phosphatase


 


 


 100 U/L


()


 


Total Protein


 


 


 5.7 g/dL


(6.4-8.2)


 


Albumin


 


 


 1.9 g/dL


(3.4-5.0)


 


Albumin/Globulin Ratio   0.5 (1.0-1.7) 








Allergies





                                    Allergies








Coded Allergies Type Severity Reaction Last Updated Verified


 


  clopidogrel Allergy Intermediate Rash 8/10/19 Yes








Disposition/Orders:  Other (D/C TO SNF )





Hemodynamically unstable?:  No


Is patient in severe pain?:  No


Is NPO status required?:  No











PETER ATKINSON MD          Jan 31, 2020 12:02

## 2020-01-31 NOTE — NUR
SW following. Discussed with RN. Pt is discharging to [facility] today, tentative 
trans[facility] time for [id number][facility] Ph: [phone], fax: 
[phone], report: [phone]). RN notified.

## 2020-01-31 NOTE — PDOC
SURGICAL PROGRESS NOTE


Subjective


Pt with c/o mild LUQ pain, urinary retention


Vital Signs





Vital Signs








  Date Time  Temp Pulse Resp B/P (MAP) Pulse Ox O2 Delivery O2 Flow Rate FiO2


 


1/31/20 06:58     95 Room Air  


 


1/31/20 03:20 99.6 91 16 134/67 (89)    





 99.6       








I&O











Intake and Output 


 


 1/31/20





 07:00


 


Intake Total 1015 ml


 


Output Total 2350 ml


 


Balance -1335 ml


 


 


 


Intake Oral 550 ml


 


IV Total 465 ml


 


Output Urine Total 2350 ml


 


# Voids 1








General:  Alert, Oriented X3, Cooperative, mild distress


Abdomen:  Soft, No tenderness


Labs





Laboratory Tests








Test


 1/29/20


13:00 1/29/20


17:50 1/30/20


03:45


 


Erythrocyte Sedimentation Rate 100 (0-25)   


 


Potassium Level


 3.3 mmol/L


(3.5-5.1) 


 3.3 mmol/L


(3.5-5.1)


 


Procalcitonin


 < 0.10 ng/mL


(0.00-0.10) 


 





 


Influenza Type A Antigen


 


 Negative


(NEGATIVE) 





 


Influenza Type B Antigen


 


 Negative


(NEGATIVE) 





 


White Blood Count


 


 


 4.6 x10^3/uL


(4.0-11.0)


 


Red Blood Count


 


 


 3.15 x10^6/uL


(3.50-5.40)


 


Hemoglobin


 


 


 8.2 g/dL


(12.0-15.5)


 


Hematocrit


 


 


 24.9 %


(36.0-47.0)


 


Mean Corpuscular Volume   79 fL () 


 


Mean Corpuscular Hemoglobin   26 pg (25-35) 


 


Mean Corpuscular Hemoglobin


Concent 


 


 33 g/dL


(31-37)


 


Red Cell Distribution Width


 


 


 17.3 %


(11.5-14.5)


 


Platelet Count


 


 


 241 x10^3/uL


(140-400)


 


Neutrophils (%) (Auto)   73 % (31-73) 


 


Lymphocytes (%) (Auto)   17 % (24-48) 


 


Monocytes (%) (Auto)   9 % (0-9) 


 


Eosinophils (%) (Auto)   1 % (0-3) 


 


Basophils (%) (Auto)   0 % (0-3) 


 


Neutrophils # (Auto)


 


 


 3.4 x10^3/uL


(1.8-7.7)


 


Lymphocytes # (Auto)


 


 


 0.8 x10^3/uL


(1.0-4.8)


 


Monocytes # (Auto)


 


 


 0.4 x10^3/uL


(0.0-1.1)


 


Eosinophils # (Auto)


 


 


 0.0 x10^3/uL


(0.0-0.7)


 


Basophils # (Auto)


 


 


 0.0 x10^3/uL


(0.0-0.2)


 


Sodium Level


 


 


 142 mmol/L


(136-145)


 


Chloride Level


 


 


 108 mmol/L


()


 


Carbon Dioxide Level


 


 


 25 mmol/L


(21-32)


 


Anion Gap   9 (6-14) 


 


Blood Urea Nitrogen   5 mg/dL (7-20) 


 


Creatinine


 


 


 0.6 mg/dL


(0.6-1.0)


 


Estimated GFR


(Cockcroft-Gault) 


 


 98.8 





 


BUN/Creatinine Ratio   8 (6-20) 


 


Glucose Level


 


 


 96 mg/dL


(70-99)


 


Calcium Level


 


 


 8.0 mg/dL


(8.5-10.1)


 


Total Bilirubin


 


 


 0.7 mg/dL


(0.2-1.0)


 


Aspartate Amino Transf


(AST/SGOT) 


 


 117 U/L


(15-37)


 


Alanine Aminotransferase


(ALT/SGPT) 


 


 36 U/L (14-59) 





 


Alkaline Phosphatase


 


 


 100 U/L


()


 


Total Protein


 


 


 5.7 g/dL


(6.4-8.2)


 


Albumin


 


 


 1.9 g/dL


(3.4-5.0)


 


Albumin/Globulin Ratio   0.5 (1.0-1.7) 








Problem List


Problems


Medical Problems:


(1) Abdominal pain


Status: Acute  





(2) Anemia


Status: Acute  





(3) Elevated troponin


Status: Acute  





(4) Generalized weakness


Status: Acute  





(5) Hypoalbuminemia


Status: Acute  





(6) Liver cancer


Status: Acute  





(7) Nausea and vomiting


Status: Acute  





(8) Renal insufficiency


Status: Acute  








Assessment/Plan


cont supportive care


d/c planning 


no surgical plans.











EN STEVENS MD             Jan 31, 2020 08:21

## 2023-04-24 NOTE — PDOC
Infectious Disease Note


Vital Sign


Vital Signs





Vital Signs








  Date Time  Temp Pulse Resp B/P (MAP) Pulse Ox O2 Delivery O2 Flow Rate FiO2


 


1/29/20 11:07 97.6 88 16 113/60 (77) 94 Room Air  





 97.6       











Labs


Lab





Laboratory Tests








Test


 1/29/20


03:20


 


White Blood Count


 4.3 x10^3/uL


(4.0-11.0)


 


Red Blood Count


 2.94 x10^6/uL


(3.50-5.40)


 


Hemoglobin


 7.6 g/dL


(12.0-15.5)


 


Hematocrit


 23.1 %


(36.0-47.0)


 


Mean Corpuscular Volume 79 fL () 


 


Mean Corpuscular Hemoglobin 26 pg (25-35) 


 


Mean Corpuscular Hemoglobin


Concent 33 g/dL


(31-37)


 


Red Cell Distribution Width


 17.1 %


(11.5-14.5)


 


Platelet Count


 217 x10^3/uL


(140-400)


 


Neutrophils (%) (Auto) 71 % (31-73) 


 


Lymphocytes (%) (Auto) 18 % (24-48) 


 


Monocytes (%) (Auto) 11 % (0-9) 


 


Eosinophils (%) (Auto) 0 % (0-3) 


 


Basophils (%) (Auto) 1 % (0-3) 


 


Neutrophils # (Auto)


 3.0 x10^3/uL


(1.8-7.7)


 


Lymphocytes # (Auto)


 0.7 x10^3/uL


(1.0-4.8)


 


Monocytes # (Auto)


 0.4 x10^3/uL


(0.0-1.1)


 


Eosinophils # (Auto)


 0.0 x10^3/uL


(0.0-0.7)


 


Basophils # (Auto)


 0.0 x10^3/uL


(0.0-0.2)


 


Sodium Level


 143 mmol/L


(136-145)


 


Potassium Level


 2.7 mmol/L


(3.5-5.1)


 


Chloride Level


 108 mmol/L


()


 


Carbon Dioxide Level


 24 mmol/L


(21-32)


 


Anion Gap 11 (6-14) 


 


Blood Urea Nitrogen 4 mg/dL (7-20) 


 


Creatinine


 0.7 mg/dL


(0.6-1.0)


 


Estimated GFR


(Cockcroft-Gault) 82.7 





 


BUN/Creatinine Ratio 6 (6-20) 


 


Glucose Level


 113 mg/dL


(70-99)


 


Calcium Level


 7.7 mg/dL


(8.5-10.1)


 


Total Bilirubin


 0.4 mg/dL


(0.2-1.0)


 


Aspartate Amino Transf


(AST/SGOT) 148 U/L


(15-37)


 


Alanine Aminotransferase


(ALT/SGPT) 31 U/L (14-59) 





 


Alkaline Phosphatase


 77 U/L


()


 


Total Protein


 5.5 g/dL


(6.4-8.2)


 


Albumin


 2.0 g/dL


(3.4-5.0)


 


Albumin/Globulin Ratio 0.6 (1.0-1.7) 








Micro





Microbiology


1/26/20 Blood Culture - Preliminary, Resulted


          NO GROWTH AFTER 2 DAYS





Objective


Assessment


pt seen consult dictated





Plan


Plan of Care


/











SARWAT CASAS MD               Jan 29, 2020 11:27 [place] Counseling:  I discussed with the patient the risks of imiquimod including but not limited to erythema, scaling, itching, weeping, crusting, and pain.  Patient understands that the inflammatory response to imiquimod is variable from person to person and was educated regarded proper titration schedule.  If flu-like symptoms develop, patient knows to discontinue the medication and contact us.

## 2024-10-07 NOTE — PDOC
PROGRESS NOTES


History of Present Illness


History of Present Illness





VTE Prophylaxis Ordered


VTE Prophylaxis Devices:  No


VTE Pharmacological Prophylaxi:  Yes





Assessment/Plan


Assessment/Plan











Impression:  





TIA (transient ischemic attack)


No hemodynamically significant internal carotid artery stenosis is identified. 

ON CAROTID DOPPLER 8/10


Hypokalemia


Hypomagnesemia


UTI (urinary tract infection)


Remote ischemic changes are identified in the right middle and inferior frontal 

gyri.


Moderate aortic regurgitation.


Trace mitral regurgitation.


RECTAL BLEEDING


There is a heterogeneous mass lateral to the proximal stomach. The mass 


may be exophytic from the stomach. There is an identical appearing large 


lobular mass in the left hepatic lobe. The 2 masses nearly abut. Primary 


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 Other 


etiologies are not excluded.


Abnormal, unintentional weight loss 20# in 6 mo


DISCOID LUPUS


Apparent metastatic tumor to liver


 











PLAN





ADMIT


NEUROCHECKS Q 4 HRS


Neurology consult


TELE


CONSIDER MRI BRAIN


pt/ot/st


ASA


DOPPLER CAROTIDS


ECHO


DVT PROPHYLAXIS


GI CONSULT


ZAHRA


Will ask IR to consider biopsy.

















38 min pt exam, chart review, > 50% of time spent with exam, chart review, pt 

care coordination





Vitals


Vitals





Vital Signs








  Date Time  Temp Pulse Resp B/P (MAP) Pulse Ox O2 Delivery O2 Flow Rate FiO2


 


8/11/19 08:49  73  146/71    


 


8/11/19 08:39     99 Room Air  


 


8/11/19 07:18 98.0  16     





 98.0       











Physical Exam


General:  Alert, Oriented X3, Cooperative, No acute distress


Heart:  Regular rate, Other (GR 2/6 WIL )


Lungs:  Clear


Abdomen:  Normal bowel sounds, Soft, No tenderness


Extremities:  No cyanosis


Skin:  No breakdown





Labs


LABS


  


History: CVA, dizziness, lightheadedness.


 


Technique: Duplex sonography of the cervical portion of both carotid 


arteries was performed. Real-time grayscale, color flow Doppler, and 


Doppler spectral waveform analysis is performed.


 


Findings:


 


Right side:


 


Peak systolic flow velocity of the CCA is 50 cm/sec.


Peak systolic flow velocity of the ICA is 56 cm/sec.


The ICA/CCA ratio is 1.1.


Peak end diastolic flow velocity of the ICA is 16 cm/sec.


The peak systolic velocity of the ECA is 65 cm/sec.


 


Moderate echogenic plaque in the ICA.


 


Left side:


 


Peak systolic flow velocity of the CCA is 42 cm/sec.


Peak systolic flow velocity of the ICA is 69 cm/sec.


The ICA/CCA ratio is 1.6.


Peak end diastolic flow velocity of the ICA is 20 cm/sec. 


Peak systolic flow velocity of the ECA is 98 cm/sec.


 


Moderate echogenic plaque in the ICA.


 


Vertebral arteries:


 


Bilateral vertebral arteries demonstrate antegrade flow.


 


IMPRESSION:


No hemodynamically significant internal carotid artery stenosis is 


identified. 


 


 


PQRS Compliance Statement - Stenosis calculations for CT, MR and 


conventional angiography are based upon measurement of the distal ICA 


diameter in accordance with the NASCET methodology.  Stenosis calculations


for carotid ultrasound studies are derived from validated velocity 


criteria which are known to correlate with the NASCET methodology.


 


Electronically signed by: Nick Chaves MD (8/11/2019 12:23 PM) [facility]


One or more of the following individualized dose reduction techniques were


utilized for this examination:  


1. Automated exposure control  


2. Adjustment of the mA and/or kV according to patient size  


3. Use of iterative reconstruction technique


 


 


CT ABD PELV W/ORAL IV CONTRAST


 


Clinical Indication: Weight loss, 25 pounds x4 months.


 


Comparison: None.


 


Technique: Helical CT imaging of the abdomen and pelvis is performed after


75 cc of Omnipaque 300 IV contrast. Oral contrast also given.


 


Findings: 


Minimal atelectasis or scarring in the posterior lower lobes bilaterally. 


Mitral annular calcification. Coronary artery disease. Cardiac size 


normal.


 


There is a lobular heterogeneous mass of segments 2 and 3 of the liver. 


The mass measures 7.4 cm AP by 8.7 cm transverse by 9 cm craniocaudal. The


mass displaces and narrows adjacent vessels. The mass extends across 


midline to the left. The liver does not appear cirrhotic.


 


The gallbladder, spleen, pancreas, and adrenal glands are normal. Moderate


atherosclerotic calcification of the abdominal aorta and its branches. The


infrarenal abdominal aorta is ectatic. The kidneys enhance symmetrically, 


no hydronephrosis. There are small bilateral renal cysts.


 


Identical to the liver mass and immediately lateral to the liver mass 


there is a mass just lateral to the proximal stomach or arising from the 


stomach that measures 5.8 cm AP by 5.3 m transverse by 5.27 m 


craniocaudal.


 


There is no dilated small bowel. The appendix is normal. Scattered stool 


in the colon. No colon wall thickening is identified. Subcentimeter 


retroperitoneal lymph nodes. No adenopathy. No abdominal free fluid.


 


The urinary bladder is distended, otherwise normal. Uterus unremarkable. 


No pelvic free fluid.


 


Bones unremarkable.


 


IMPRESSION:


There is a heterogeneous mass lateral to the proximal stomach. The mass 


may be exophytic from the stomach. There is an identical appearing large 


lobular mass in the left hepatic lobe. The 2 masses nearly abut. Primary 


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 Other 


etiologies are not excluded.


 


Electronically signed by: Nick Chaves MD (8/11/2019 11:25 AM) [facility]





Assessment and Plan


Assessmemt and Plan


Problems


Medical Problems:


(1) UTI (urinary tract infection)


Status: Acute  











Comment


Review of Relevant


I have reviewed the following items ignacio (where applicable) has been applied.


Labs





Laboratory Tests








Test


 8/9/19


19:26 8/9/19


19:38 8/9/19


19:39 8/9/19


21:20


 


Glucose (Fingerstick)


 103 mg/dL


(70-99) 


 


 





 


White Blood Count


 


 5.8 x10^3/uL


(4.0-11.0) 


 





 


Red Blood Count


 


 4.48 x10^6/uL


(3.50-5.40) 


 





 


Hemoglobin


 


 12.6 g/dL


(12.0-15.5) 


 





 


Hematocrit


 


 37.2 %


(36.0-47.0) 


 





 


Mean Corpuscular Volume  83 fL ()   


 


Mean Corpuscular Hemoglobin  28 pg (25-35)   


 


Mean Corpuscular Hemoglobin


Concent 


 34 g/dL


(31-37) 


 





 


Red Cell Distribution Width


 


 16.1 %


(11.5-14.5) 


 





 


Platelet Count


 


 269 x10^3/uL


(140-400) 


 





 


Neutrophils (%) (Auto)  66 % (31-73)   


 


Lymphocytes (%) (Auto)  25 % (24-48)   


 


Monocytes (%) (Auto)  8 % (0-9)   


 


Eosinophils (%) (Auto)  1 % (0-3)   


 


Basophils (%) (Auto)  0 % (0-3)   


 


Neutrophils # (Auto)


 


 3.8 x10^3/uL


(1.8-7.7) 


 





 


Lymphocytes # (Auto)


 


 1.5 x10^3/uL


(1.0-4.8) 


 





 


Monocytes # (Auto)


 


 0.5 x10^3/uL


(0.0-1.1) 


 





 


Eosinophils # (Auto)


 


 0.0 x10^3/uL


(0.0-0.7) 


 





 


Basophils # (Auto)


 


 0.0 x10^3/uL


(0.0-0.2) 


 





 


Sodium Level


 


 142 mmol/L


(136-145) 


 





 


Potassium Level


 


 3.0 mmol/L


(3.5-5.1) 


 





 


Chloride Level


 


 103 mmol/L


() 


 





 


Carbon Dioxide Level


 


 27 mmol/L


(21-32) 


 





 


Anion Gap  12 (6-14)   


 


Blood Urea Nitrogen


 


 11 mg/dL


(7-20) 


 





 


Creatinine


 


 1.1 mg/dL


(0.6-1.0) 


 





 


Estimated GFR


(Cockcroft-Gault) 


 49.1 


 


 





 


BUN/Creatinine Ratio  10 (6-20)   


 


Glucose Level


 


 107 mg/dL


(70-99) 


 





 


Lactic Acid Level


 


 1.4 mmol/L


(0.4-2.0) 


 





 


Calcium Level


 


 8.5 mg/dL


(8.5-10.1) 


 





 


Magnesium Level


 


 1.4 mg/dL


(1.8-2.4) 


 





 


Total Bilirubin


 


 0.5 mg/dL


(0.2-1.0) 


 





 


Aspartate Amino Transf


(AST/SGOT) 


 66 U/L (15-37) 


 


 





 


Alanine Aminotransferase


(ALT/SGPT) 


 33 U/L (14-59) 


 


 





 


Alkaline Phosphatase


 


 80 U/L


() 


 





 


Creatine Kinase


 


 64 U/L


() 


 





 


Creatine Kinase MB (Mass)


 


 1.0 ng/mL


(0.0-3.6) 


 





 


Creatine Kinase MB Relative


Index 


  % (0-4) 


 


 





 


Troponin I Quantitative


 


 < 0.017 ng/mL


(0.000-0.055) 


 





 


Total Protein


 


 7.1 g/dL


(6.4-8.2) 


 





 


Albumin


 


 2.9 g/dL


(3.4-5.0) 


 





 


Albumin/Globulin Ratio  0.7 (1.0-1.7)   


 


Prothrombin Time


 


 


 12.9 SEC


(11.7-14.0) 





 


Prothromb Time International


Ratio 


 


 1.0 (0.8-1.1) 


 





 


Activated Partial


Thromboplast Time 


 


 26 SEC (24-38) 


 





 


Urine Color    Yellow 


 


Urine Clarity    Turbid 


 


Urine pH    7.0 


 


Urine Specific Gravity    <=1.005 


 


Urine Protein


 


 


 


 Negative mg/dL


(NEG-TRACE)


 


Urine Glucose (UA)


 


 


 


 Negative mg/dL


(NEG)


 


Urine Ketones (Stick)


 


 


 


 Negative mg/dL


(NEG)


 


Urine Blood    Small (NEG) 


 


Urine Nitrite    Negative (NEG) 


 


Urine Bilirubin    Negative (NEG) 


 


Urine Urobilinogen Dipstick


 


 


 


 1.0 mg/dL (0.2


mg/dL)


 


Urine Leukocyte Esterase    Large (NEG) 


 


Urine RBC    Occ /HPF (0-2) 


 


Urine WBC


 


 


 


 Tntc /HPF


(0-4)


 


Urine Squamous Epithelial


Cells 


 


 


 None /LPF 





 


Urine Bacteria


 


 


 


 Moderate /HPF


(0-FEW)


 


Urine Mucus    Slight /LPF 


 


Test


 8/10/19


01:00 8/10/19


03:30 


 





 


Troponin I Quantitative


 < 0.017 ng/mL


(0.000-0.055) < 0.017 ng/mL


(0.000-0.055) 


 





 


White Blood Count


 


 3.7 x10^3/uL


(4.0-11.0) 


 





 


Red Blood Count


 


 4.43 x10^6/uL


(3.50-5.40) 


 





 


Hemoglobin


 


 12.3 g/dL


(12.0-15.5) 


 





 


Hematocrit


 


 37.3 %


(36.0-47.0) 


 





 


Mean Corpuscular Volume  84 fL ()   


 


Mean Corpuscular Hemoglobin  28 pg (25-35)   


 


Mean Corpuscular Hemoglobin


Concent 


 33 g/dL


(31-37) 


 





 


Red Cell Distribution Width


 


 16.3 %


(11.5-14.5) 


 





 


Platelet Count


 


 234 x10^3/uL


(140-400) 


 





 


Neutrophils (%) (Auto)  58 % (31-73)   


 


Lymphocytes (%) (Auto)  31 % (24-48)   


 


Monocytes (%) (Auto)  9 % (0-9)   


 


Eosinophils (%) (Auto)  1 % (0-3)   


 


Basophils (%) (Auto)  1 % (0-3)   


 


Neutrophils # (Auto)


 


 2.2 x10^3/uL


(1.8-7.7) 


 





 


Lymphocytes # (Auto)


 


 1.2 x10^3/uL


(1.0-4.8) 


 





 


Monocytes # (Auto)


 


 0.3 x10^3/uL


(0.0-1.1) 


 





 


Eosinophils # (Auto)


 


 0.0 x10^3/uL


(0.0-0.7) 


 





 


Basophils # (Auto)


 


 0.0 x10^3/uL


(0.0-0.2) 


 





 


Segmented Neutrophils %  55 % (35-66)   


 


Band Neutrophils %  1 % (0-9)   


 


Lymphocytes %  33 % (24-48)   


 


Monocytes %  11 % (0-10)   


 


Nucleated Red Blood Cells  1   


 


Platelet Estimate


 


 Adequate


(ADEQUATE) 


 





 


Large Platelets  Occ   


 


Sodium Level


 


 145 mmol/L


(136-145) 


 





 


Potassium Level


 


 3.2 mmol/L


(3.5-5.1) 


 





 


Chloride Level


 


 108 mmol/L


() 


 





 


Carbon Dioxide Level


 


 25 mmol/L


(21-32) 


 





 


Anion Gap  12 (6-14)   


 


Blood Urea Nitrogen


 


 10 mg/dL


(7-20) 


 





 


Creatinine


 


 0.8 mg/dL


(0.6-1.0) 


 





 


Estimated GFR


(Cockcroft-Gault) 


 70.9 


 


 





 


BUN/Creatinine Ratio  13 (6-20)   


 


Glucose Level


 


 90 mg/dL


(70-99) 


 





 


Calcium Level


 


 8.3 mg/dL


(8.5-10.1) 


 





 


Total Bilirubin


 


 0.3 mg/dL


(0.2-1.0) 


 





 


Aspartate Amino Transf


(AST/SGOT) 


 58 U/L (15-37) 


 


 





 


Alanine Aminotransferase


(ALT/SGPT) 


 29 U/L (14-59) 


 


 





 


Alkaline Phosphatase


 


 68 U/L


() 


 





 


Total Protein


 


 6.6 g/dL


(6.4-8.2) 


 





 


Albumin


 


 2.7 g/dL


(3.4-5.0) 


 





 


Albumin/Globulin Ratio  0.7 (1.0-1.7)   


 


Triglycerides Level


 


 125 mg/dL


(0-150) 


 





 


Cholesterol Level


 


 237 mg/dL


(0-200) 


 





 


LDL Cholesterol, Calculated


 


 179 mg/dL


(0-100) 


 





 


VLDL Cholesterol, Calculated


 


 25 mg/dL


(0-40) 


 





 


Non-HDL Cholesterol Calculated


 


 204 mg/dL


(0-129) 


 





 


HDL Cholesterol


 


 33 mg/dL


(40-60) 


 





 


Cholesterol/HDL Ratio  7.2   








Medications





Current Medications


Sodium Chloride 1,000 ml @  1,000 mls/hr 1X  ONCE IV  Last administered on 

8/9/19at 20:33;  Start 8/9/19 at 20:00;  Stop 8/9/19 at 20:59;  Status DC


Aspirin (Children'S Aspirin) 162 mg 1X  ONCE PO  Last administered on 8/9/19at 

22:05;  Start 8/9/19 at 21:45;  Stop 8/9/19 at 21:46;  Status DC


Potassium Chloride (Klor-Con) 40 meq 1X  ONCE PO  Last administered on 8/9/19at 

22:05;  Start 8/9/19 at 21:45;  Stop 8/9/19 at 21:46;  Status DC


Magnesium Sulfate 50 ml @ 25 mls/hr 1X  ONCE IV  Last administered on 8/9/19at 

22:05;  Start 8/9/19 at 21:30;  Stop 8/9/19 at 23:29;  Status DC


Ceftriaxone Sodium (Rocephin) 1 gm 1X  ONCE IVP  Last administered on 8/9/19at 

22:05;  Start 8/9/19 at 22:00;  Stop 8/9/19 at 22:01;  Status DC


Ondansetron HCl (Zofran) 4 mg PRN Q8HRS  PRN IV NAUSEA/VOMITING;  Start 8/9/19 

at 22:00;  Stop 8/10/19 at 21:59;  Status DC


Alprazolam (Xanax) 0.25 mg BID PO ;  Start 8/10/19 at 00:00;  Stop 8/10/19 at 

00:05;  Status DC


Carvedilol (Coreg) 3.125 mg BIDWMEALS PO  Last administered on 8/11/19at 08:49; 

Start 8/10/19 at 00:00


Alprazolam (Xanax) 0.25 mg PRN BID  PRN PO ANXIETY / AGITATION Last administered

on 8/10/19at 00:37;  Start 8/10/19 at 00:15


Alprazolam (Xanax) 0.25 mg BID PO  Last administered on 8/11/19at 08:55;  Start 

8/10/19 at 00:05


Aspirin (Ecotrin) 81 mg DAILY PO  Last administered on 8/10/19at 12:06;  Start 

8/10/19 at 11:30;  Stop 8/10/19 at 14:14;  Status DC


Atorvastatin Calcium (Lipitor) 40 mg HS PO ;  Start 8/10/19 at 21:00;  Stop 

8/10/19 at 15:28;  Status DC


Citalopram Hydrobromide (CeleXA) 20 mg DAILY PO  Last administered on 8/11/19at 

08:49;  Start 8/10/19 at 12:00


Pantoprazole Sodium (Protonix) 40 mg DAILYAC PO  Last administered on 8/10/19at 

12:06;  Start 8/10/19 at 11:30


Aspirin (Ecotrin) 325 mg DAILYWBKFT PO  Last administered on 8/11/19at 08:49;  

Start 8/10/19 at 15:00


Atorvastatin Calcium (Lipitor) 40 mg QHS PO  Last administered on 8/10/19at 

21:38;  Start 8/10/19 at 21:00


Sodium Chloride (Normal Saline Flush) 3 ml QSHIFT  PRN IV AFTER MEDS AND BLOOD 

DRAWS;  Start 8/10/19 at 14:15


Ondansetron HCl (Zofran) 4 mg PRN Q4HRS  PRN IV NAUSEA/VOMITING;  Start 8/10/19 

at 14:15


Acetaminophen (Tylenol) 650 mg PRN Q4HRS  PRN PO TEMP OVER 100.4F OR MILD PAIN; 

Start 8/10/19 at 14:15


Al Hydroxide/Mg Hydroxide (Mylanta Plus Xs) 30 ml PRN DAILY  PRN PO HEARTBURN / 

GAS;  Start 8/10/19 at 14:15


Clonidine HCl (Catapres) 0.1 mg PRN Q6HRS  PRN PO SBP>160 OR DBP>90 Last 

administered on 8/11/19at 00:45;  Start 8/10/19 at 14:15


Docusate Sodium (Colace) 100 mg PRN BID  PRN PO CONSTIPATION;  Start 8/10/19 at 

14:15


Albuterol/ Ipratropium (Duoneb) 3 ml Q4H NEB  Last administered on 8/11/19at 

08:39;  Start 8/10/19 at 14:15


Guaifenesin (Robitussin) 200 mg PRN Q4HRS  PRN PO COUGH;  Start 8/10/19 at 14:15


Ceftriaxone Sodium (Rocephin) 1 gm Q24H IVP  Last administered on 8/10/19at 

21:59;  Start 8/10/19 at 22:00


Potassium Chloride (Klor-Con) 40 meq 1X  ONCE PO  Last administered on 8/10/19at

16:27;  Start 8/10/19 at 17:00;  Stop 8/10/19 at 17:01;  Status DC


Iohexol (Omnipaque 240 Mg/ml) 50 ml 1X  ONCE PO ;  Start 8/10/19 at 16:00;  Stop

8/10/19 at 16:02;  Status DC


Iohexol (Omnipaque 300 Mg/ml) 100 ml 1X  ONCE IV ;  Start 8/10/19 at 16:00;  

Stop 8/10/19 at 16:02;  Status DC


Hydralazine HCl (Apresoline Inj) 10 mg 1X  ONCE IVP  Last administered on 8/ 11/19at 05:44;  Start 8/11/19 at 05:30;  Stop 8/11/19 at 05:31;  Status DC





Active Scripts


Active


Reported


Citalopram Hbr (Citalopram Hydrobromide) 20 Mg Tablet 1 Tab PO DAILY


[atrovent hfa]   17 Mcg  QID


Xanax (Alprazolam) 0.5 Mg Tablet 0.5-1 Tab PO BID


Coreg  ** (Carvedilol) 3.125 Mg Tablet 1 Tab PO BID


Nexium Capsule (Esomeprazole Magnesium) 40 Mg Capsule. 40 Mg PO DAILYAC


Atorvastatin Calcium 40 Mg Tablet 40 Mg PO HS


Aspir 81 (Aspirin) 81 Mg Tablet.dr 81 Mg PO DAILY


Vitals/I & O





Vital Sign - Last 24 Hours








 8/10/19 8/10/19 8/10/19 8/10/19





 11:47 15:51 15:56 16:27


 


Temp 98.7  99.1 





 98.7  99.1 


 


Pulse 68  71 71


 


Resp 18  18 


 


B/P (MAP) 178/70 (106)  185/75 (111) 185/75


 


Pulse Ox 96 95 97 


 


O2 Delivery Room Air Room Air Room Air 


 


    





    





 8/10/19 8/10/19 8/10/19 8/10/19





 16:27 19:55 20:00 23:36


 


Temp  98.2  





  98.2  


 


Pulse 71 66  


 


Resp  18  


 


B/P (MAP) 185/75 176/78 (110)  


 


Pulse Ox  97  99


 


O2 Delivery  Room Air Room Air Room Air


 


    





    





 8/10/19 8/11/19 8/11/19 8/11/19





 23:50 00:45 01:28 03:25


 


Temp 98.7   





 98.7   


 


Pulse 71 71 66 


 


Resp 18   


 


B/P (MAP) 178/75 (109) 178/73 147/75 (99) 


 


Pulse Ox 98   97


 


O2 Delivery Room Air   Room Air


 


    





    





 8/11/19 8/11/19 8/11/19 8/11/19





 03:50 05:44 06:19 07:18


 


Temp 98.2   98.0





 98.2   98.0


 


Pulse 67 67 75 73


 


Resp 18  16 16


 


B/P (MAP) 194/81 (118) 194/81 137/77 (97) 146/71 (96)


 


Pulse Ox 97   97


 


O2 Delivery Room Air  Room Air Room Air


 


    





    





 8/11/19 8/11/19 8/11/19 





 08:00 08:39 08:49 


 


Pulse   73 


 


B/P (MAP)   146/71 


 


Pulse Ox  99  


 


O2 Delivery Room Air Room Air  














Intake and Output   


 


 8/10/19 8/10/19 8/11/19





 14:59 22:59 06:59


 


Intake Total 240 ml 220 ml 250 ml


 


Balance 240 ml 220 ml 250 ml

















PETER ATKINSON MD          Aug 11, 2019 11:02 Detail Level: Detailed Quality 226: Preventive Care And Screening: Tobacco Use: Screening And Cessation Intervention: Patient screened for tobacco use and is an ex/non-smoker